# Patient Record
Sex: FEMALE | Race: WHITE | Employment: OTHER | URBAN - METROPOLITAN AREA
[De-identification: names, ages, dates, MRNs, and addresses within clinical notes are randomized per-mention and may not be internally consistent; named-entity substitution may affect disease eponyms.]

---

## 2018-04-20 ENCOUNTER — HOSPITAL ENCOUNTER (EMERGENCY)
Age: 63
Discharge: HOME | End: 2018-04-20
Attending: EMERGENCY MEDICINE
Payer: SELF-PAY

## 2018-04-20 VITALS
BODY MASS INDEX: 29.02 KG/M2 | RESPIRATION RATE: 18 BRPM | OXYGEN SATURATION: 98 % | WEIGHT: 170 LBS | SYSTOLIC BLOOD PRESSURE: 142 MMHG | HEIGHT: 64 IN | TEMPERATURE: 98.1 F | HEART RATE: 94 BPM | DIASTOLIC BLOOD PRESSURE: 72 MMHG

## 2018-04-20 DIAGNOSIS — M25.462 KNEE EFFUSION, LEFT: Primary | ICD-10-CM

## 2018-04-20 LAB
ANION GAP SERPL CALC-SCNC: 7 MMOL/L (ref 7–16)
BASOPHILS # BLD: 0.1 K/UL (ref 0–0.2)
BASOPHILS NFR BLD: 1 % (ref 0–2)
BUN SERPL-MCNC: 18 MG/DL (ref 8–23)
CALCIUM SERPL-MCNC: 9.3 MG/DL (ref 8.3–10.4)
CHLORIDE SERPL-SCNC: 103 MMOL/L (ref 98–107)
CO2 SERPL-SCNC: 31 MMOL/L (ref 21–32)
CREAT SERPL-MCNC: 0.96 MG/DL (ref 0.6–1)
CRP SERPL-MCNC: <0.3 MG/DL (ref 0–0.9)
DIFFERENTIAL METHOD BLD: ABNORMAL
EOSINOPHIL # BLD: 0.4 K/UL (ref 0–0.8)
EOSINOPHIL NFR BLD: 6 % (ref 0.5–7.8)
ERYTHROCYTE [DISTWIDTH] IN BLOOD BY AUTOMATED COUNT: 12.5 % (ref 11.9–14.6)
GLUCOSE SERPL-MCNC: 112 MG/DL (ref 65–100)
HCT VFR BLD AUTO: 40.5 % (ref 35.8–46.3)
HGB BLD-MCNC: 13.2 G/DL (ref 11.7–15.4)
IMM GRANULOCYTES # BLD: 0 K/UL (ref 0–0.5)
IMM GRANULOCYTES NFR BLD AUTO: 0 % (ref 0–5)
LYMPHOCYTES # BLD: 2.6 K/UL (ref 0.5–4.6)
LYMPHOCYTES NFR BLD: 39 % (ref 13–44)
MCH RBC QN AUTO: 28.1 PG (ref 26.1–32.9)
MCHC RBC AUTO-ENTMCNC: 32.6 G/DL (ref 31.4–35)
MCV RBC AUTO: 86.2 FL (ref 79.6–97.8)
MONOCYTES # BLD: 0.6 K/UL (ref 0.1–1.3)
MONOCYTES NFR BLD: 10 % (ref 4–12)
NEUTS SEG # BLD: 2.9 K/UL (ref 1.7–8.2)
NEUTS SEG NFR BLD: 44 % (ref 43–78)
PLATELET # BLD AUTO: 183 K/UL (ref 150–450)
PMV BLD AUTO: 10.5 FL (ref 10.8–14.1)
POTASSIUM SERPL-SCNC: 4 MMOL/L (ref 3.5–5.1)
RBC # BLD AUTO: 4.7 M/UL (ref 4.05–5.25)
SODIUM SERPL-SCNC: 141 MMOL/L (ref 136–145)
WBC # BLD AUTO: 6.5 K/UL (ref 4.3–11.1)

## 2018-04-20 PROCEDURE — 89060 EXAM SYNOVIAL FLUID CRYSTALS: CPT | Performed by: EMERGENCY MEDICINE

## 2018-04-20 PROCEDURE — 80048 BASIC METABOLIC PNL TOTAL CA: CPT | Performed by: EMERGENCY MEDICINE

## 2018-04-20 PROCEDURE — 75810000054 HC ARTHOCENTISIS JOINT: Performed by: EMERGENCY MEDICINE

## 2018-04-20 PROCEDURE — 87205 SMEAR GRAM STAIN: CPT | Performed by: EMERGENCY MEDICINE

## 2018-04-20 PROCEDURE — 86140 C-REACTIVE PROTEIN: CPT | Performed by: EMERGENCY MEDICINE

## 2018-04-20 PROCEDURE — 99283 EMERGENCY DEPT VISIT LOW MDM: CPT | Performed by: EMERGENCY MEDICINE

## 2018-04-20 PROCEDURE — 85025 COMPLETE CBC W/AUTO DIFF WBC: CPT | Performed by: EMERGENCY MEDICINE

## 2018-04-20 PROCEDURE — 74011250637 HC RX REV CODE- 250/637: Performed by: EMERGENCY MEDICINE

## 2018-04-20 PROCEDURE — 89050 BODY FLUID CELL COUNT: CPT | Performed by: EMERGENCY MEDICINE

## 2018-04-20 RX ORDER — HYDROCODONE BITARTRATE AND ACETAMINOPHEN 7.5; 325 MG/1; MG/1
1 TABLET ORAL
Status: COMPLETED | OUTPATIENT
Start: 2018-04-20 | End: 2018-04-20

## 2018-04-20 RX ORDER — SODIUM CHLORIDE 0.9 % (FLUSH) 0.9 %
5-10 SYRINGE (ML) INJECTION EVERY 8 HOURS
Status: DISCONTINUED | OUTPATIENT
Start: 2018-04-20 | End: 2018-04-20 | Stop reason: HOSPADM

## 2018-04-20 RX ORDER — SODIUM CHLORIDE 0.9 % (FLUSH) 0.9 %
5-10 SYRINGE (ML) INJECTION AS NEEDED
Status: DISCONTINUED | OUTPATIENT
Start: 2018-04-20 | End: 2018-04-20 | Stop reason: HOSPADM

## 2018-04-20 RX ORDER — DICLOFENAC SODIUM AND MISOPROSTOL 75; 200 MG/1; UG/1
1 TABLET, DELAYED RELEASE ORAL 2 TIMES DAILY
Qty: 30 TAB | Refills: 0 | Status: SHIPPED | OUTPATIENT
Start: 2018-04-20 | End: 2018-04-25

## 2018-04-20 RX ADMIN — HYDROCODONE BITARTRATE AND ACETAMINOPHEN 1 TABLET: 7.5; 325 TABLET ORAL at 13:01

## 2018-04-20 NOTE — ED PROVIDER NOTES
HPI Comments: Pt is followed by [facility], Dr. Kerry Arredondo, and was seen in the office 2 weeks ago for left knee pain after a fall 1 week prior to that. She reports receiving an injection into the left knee joint. She has been ambulating with a walker an now reports increased swelling and \"shooting\" pain in the area of the left tibial tuberosity. No redness, dyspnea or swelling of the leg noted. She contacted the Ortho office by phone and was told to come to the ER to be evaluated for possible DVT. Patient is a 57 y.o. female presenting with leg pain. The history is provided by the patient. Leg Pain    This is a new problem. The current episode started more than 1 week ago. The problem occurs constantly. The problem has not changed since onset. The pain is present in the left lower leg and left knee. Quality: \"shooting\" The pain is at a severity of 8/10. The pain is moderate. Associated symptoms include stiffness. Pertinent negatives include no numbness and no tingling. The symptoms are aggravated by standing and movement. Treatments tried: Joint injection 2 weeks ago. The treatment provided no relief. There has been a history of trauma (X-rays were done in orthopedist's office which were negative per patient).         Past Medical History:   Diagnosis Date    Diabetes ([address]     Hypercholesterolemia     Hypertension     Mixed hyperlipidemia 5/11/2015    Type 2 diabetes mellitus without complication, without long-term current use of insulin ([address] 5/11/2015       Past Surgical History:   Procedure Laterality Date    HX GYN      tubal    HX ORTHOPAEDIC Right     knee    HX OTHER SURGICAL      abcess         Family History:   Problem Relation Age of Onset    Cancer Mother      colon    Diabetes Mother     Cancer Sister     Diabetes Sister     Cancer Sister     Cancer Maternal Aunt      neck       Social History     Social History    Marital status:      Spouse name: N/A    Number of children: N/A    Years of education: N/A     Occupational History    Not on file. Social History Main Topics    Smoking status: Never Smoker    Smokeless tobacco: Never Used    Alcohol use Yes      Comment: Occ. wine    Drug use: Not on file    Sexual activity: Not on file     Other Topics Concern    Not on file     Social History Narrative         ALLERGIES: Keflex [cephalexin]; Oxycodone; and Sulfamethoxazole-trimethoprim    Review of Systems   Constitutional: Negative for chills and fever. Musculoskeletal: Positive for stiffness. Neurological: Negative for tingling and numbness. All other systems reviewed and are negative. Vitals:    04/20/18 1130   BP: 153/73   Pulse: 94   Resp: 16   Temp: 98 °F (36.7 °C)   SpO2: 99%   Weight: 77.1 kg (170 lb)   Height: 5' 4\" (1.626 m)            Physical Exam   Constitutional: She is oriented to person, place, and time. She appears well-developed and well-nourished. No distress. HENT:   Head: Normocephalic and atraumatic. Eyes: Conjunctivae and EOM are normal. Pupils are equal, round, and reactive to light. Musculoskeletal: Normal range of motion. She exhibits tenderness. She exhibits no edema or deformity. There is no physical evidence on examination of the left lower extremity to suggest a DVT. There is no peripheral edema or ankle edema the extremity is neurovascularly intact. There is no calf tenderness and there is a negative Homans sign. There is however a joint effusion noted within the left knee the patella is ballotable. There is some warmth noted on examination but no erythema. Neurological: She is alert and oriented to person, place, and time. Skin: Skin is warm and dry. No erythema. Psychiatric: She has a normal mood and affect. Nursing note and vitals reviewed. MDM  Number of Diagnoses or Management Options  Diagnosis management comments: No physical evidence to suggest DVT noted however there is a joint effusion. With a history of the trauma as well as the joint injection possibility of infectious arthritis needs to be evaluated and joint aspiration will be performed in addition to some blood work and the patient will be given an analgesic. Amount and/or Complexity of Data Reviewed  Clinical lab tests: ordered and reviewed    Risk of Complications, Morbidity, and/or Mortality  Presenting problems: moderate  Diagnostic procedures: moderate  Management options: moderate    Patient Progress  Patient progress: stable        ED Course       Arthrocentesis  Date/Time: 4/20/2018 12:41 PM  Performed by: Tiffanie Palencia by: Scott Lr     Consent:     Consent obtained:  Verbal    Consent given by:  Patient    Risks discussed:  Bleeding, infection and incomplete drainage    Alternatives discussed:  No treatment  Location:     Location:  Knee    Knee:  L knee  Anesthesia (see MAR for exact dosages): Anesthesia method:  Local infiltration    Local anesthetic:  Lidocaine 1% w/o epi  Procedure details:     Preparation: Patient was prepped and draped in usual sterile fashion      Needle gauge:  18 G    Ultrasound guidance: no      Approach:  Medial    Aspirate amount:  5ml    Aspirate characteristics:  Yellow and serous    Steroid injected: no      Specimen collected: yes    Post-procedure details:     Dressing:  Adhesive bandage    Patient tolerance of procedure: Tolerated well, no immediate complications                 Dr. Zackery Sanches office notified of Dx and Tx plan for outpatient follow up.   No clinical sign of DVT; left knee fluid appears inflammatory in nature  Lab reported verbally WBC on fluid was 765

## 2018-04-20 NOTE — ED TRIAGE NOTES
Stinging feeling in the left leg. Shala Guzman about 3 weeks ago. Patient has cortisone shot 2 weeks ago and was told to follow up with Agatha Cummings. Ortho office called about swelling, redness and heat on the left knee.  Office states patient should come to the ER for ultrasound to rule out blood clot

## 2018-04-20 NOTE — ED NOTES
I have reviewed discharge instructions with the patient. The patient verbalized understanding. Patient left ED via Discharge Method: ambulatory to Home with sister. Opportunity for questions and clarification provided. Patient given 1 scripts. To continue your aftercare when you leave the hospital, you may receive an automated call from our care team to check in on how you are doing. This is a free service and part of our promise to provide the best care and service to meet your aftercare needs.  If you have questions, or wish to unsubscribe from this service please call [phone]. Thank you for Choosing our [facility] Emergency Department.

## 2018-04-21 LAB
APPEARANCE FLD: NORMAL
BODY FLD TYPE: NORMAL
COLOR FLD: NORMAL
CRYSTALS FLD MICRO: NORMAL
LYMPHOCYTES NFR FLD: 76 %
NEUTROPHILS NFR FLD: 24 %
NUC CELL # FLD: 765 /CU MM
RBC # FLD: NORMAL /CU MM
SPECIMEN SOURCE FLD: NORMAL

## 2018-04-21 NOTE — PROGRESS NOTES
Dr. Astrid Humphrey reviewed the cell count yesterday prior to discharge and states no antibiotics indicated.

## 2018-04-22 LAB
BACTERIA SPEC CULT: NORMAL
GRAM STN SPEC: NORMAL
GRAM STN SPEC: NORMAL
SERVICE CMNT-IMP: NORMAL

## 2018-05-23 PROBLEM — M1A.0620 IDIOPATHIC CHRONIC GOUT OF LEFT KNEE WITHOUT TOPHUS: Status: ACTIVE | Noted: 2018-05-23

## 2018-10-24 ENCOUNTER — HOSPITAL ENCOUNTER (OUTPATIENT)
Dept: MRI IMAGING | Age: 63
Discharge: HOME | End: 2018-10-24
Attending: FAMILY MEDICINE
Payer: MEDICARE

## 2018-10-24 DIAGNOSIS — G89.29 CHRONIC MIDLINE LOW BACK PAIN WITH BILATERAL SCIATICA: ICD-10-CM

## 2018-10-24 DIAGNOSIS — M54.42 CHRONIC MIDLINE LOW BACK PAIN WITH BILATERAL SCIATICA: ICD-10-CM

## 2018-10-24 DIAGNOSIS — M54.41 CHRONIC MIDLINE LOW BACK PAIN WITH BILATERAL SCIATICA: ICD-10-CM

## 2018-10-24 DIAGNOSIS — R29.898 WEAKNESS OF BOTH LEGS: ICD-10-CM

## 2018-10-24 PROCEDURE — 72148 MRI LUMBAR SPINE W/O DYE: CPT

## 2018-10-26 NOTE — PROGRESS NOTES
Please let know multi-level degenerative changes with severe right and moderate left nervie canal narrowing. Also with disc bulging. Please get in with [facility] to look at potential treatment options.

## 2019-07-01 ENCOUNTER — HOSPITAL ENCOUNTER (OUTPATIENT)
Dept: MRI IMAGING | Age: 64
Discharge: HOME | End: 2019-07-01
Attending: NURSE PRACTITIONER
Payer: MEDICARE

## 2019-07-01 DIAGNOSIS — G95.9 CERVICAL MYELOPATHY (HCC): ICD-10-CM

## 2019-07-01 DIAGNOSIS — R29.2 HYPER REFLEXIA: ICD-10-CM

## 2019-07-01 PROCEDURE — 72141 MRI NECK SPINE W/O DYE: CPT

## 2019-07-30 RX ORDER — CEFAZOLIN SODIUM/WATER 2 G/20 ML
2 SYRINGE (ML) INTRAVENOUS ONCE
Status: CANCELLED | OUTPATIENT
Start: 2019-07-30 | End: 2019-07-30

## 2019-08-07 ENCOUNTER — HOSPITAL ENCOUNTER (OUTPATIENT)
Dept: SURGERY | Age: 64
Discharge: HOME | End: 2019-08-07
Payer: MEDICARE

## 2019-08-07 VITALS
SYSTOLIC BLOOD PRESSURE: 108 MMHG | HEART RATE: 70 BPM | TEMPERATURE: 97.5 F | WEIGHT: 170 LBS | RESPIRATION RATE: 18 BRPM | OXYGEN SATURATION: 99 % | HEIGHT: 64 IN | BODY MASS INDEX: 29.02 KG/M2 | DIASTOLIC BLOOD PRESSURE: 58 MMHG

## 2019-08-07 LAB
ANION GAP SERPL CALC-SCNC: 7 MMOL/L (ref 7–16)
APPEARANCE UR: CLEAR
ATRIAL RATE: 65 BPM
BACTERIA SPEC CULT: NORMAL
BACTERIA URNS QL MICRO: 0 /HPF
BILIRUB UR QL: NEGATIVE
BUN SERPL-MCNC: 16 MG/DL (ref 8–23)
CALCIUM SERPL-MCNC: 9.2 MG/DL (ref 8.3–10.4)
CALCULATED P AXIS, ECG09: 70 DEGREES
CALCULATED R AXIS, ECG10: 72 DEGREES
CALCULATED T AXIS, ECG11: 56 DEGREES
CASTS URNS QL MICRO: 0 /LPF
CHLORIDE SERPL-SCNC: 105 MMOL/L (ref 98–107)
CO2 SERPL-SCNC: 31 MMOL/L (ref 21–32)
COLOR UR: YELLOW
CREAT SERPL-MCNC: 1.13 MG/DL (ref 0.6–1)
DIAGNOSIS, 93000: NORMAL
EPI CELLS #/AREA URNS HPF: ABNORMAL /HPF
ERYTHROCYTE [DISTWIDTH] IN BLOOD BY AUTOMATED COUNT: 12.5 % (ref 11.9–14.6)
EST. AVERAGE GLUCOSE BLD GHB EST-MCNC: 137 MG/DL
GLUCOSE BLD STRIP.AUTO-MCNC: 105 MG/DL (ref 65–100)
GLUCOSE SERPL-MCNC: 105 MG/DL (ref 65–100)
GLUCOSE UR STRIP.AUTO-MCNC: NEGATIVE MG/DL
HBA1C MFR BLD: 6.4 % (ref 4.8–6)
HCT VFR BLD AUTO: 39 % (ref 35.8–46.3)
HGB BLD-MCNC: 12.2 G/DL (ref 11.7–15.4)
HGB UR QL STRIP: ABNORMAL
KETONES UR QL STRIP.AUTO: NEGATIVE MG/DL
LEUKOCYTE ESTERASE UR QL STRIP.AUTO: NEGATIVE
MCH RBC QN AUTO: 28 PG (ref 26.1–32.9)
MCHC RBC AUTO-ENTMCNC: 31.3 G/DL (ref 31.4–35)
MCV RBC AUTO: 89.7 FL (ref 79.6–97.8)
NITRITE UR QL STRIP.AUTO: NEGATIVE
NRBC # BLD: 0 K/UL (ref 0–0.2)
P-R INTERVAL, ECG05: 114 MS
PH UR STRIP: 5 [PH] (ref 5–9)
PLATELET # BLD AUTO: 156 K/UL (ref 150–450)
PMV BLD AUTO: 11.1 FL (ref 9.4–12.3)
POTASSIUM SERPL-SCNC: 4 MMOL/L (ref 3.5–5.1)
PROT UR STRIP-MCNC: NEGATIVE MG/DL
Q-T INTERVAL, ECG07: 384 MS
QRS DURATION, ECG06: 84 MS
QTC CALCULATION (BEZET), ECG08: 399 MS
RBC # BLD AUTO: 4.35 M/UL (ref 4.05–5.2)
RBC #/AREA URNS HPF: 0 /HPF
SERVICE CMNT-IMP: NORMAL
SODIUM SERPL-SCNC: 143 MMOL/L (ref 136–145)
SP GR UR REFRACTOMETRY: 1.01 (ref 1–1.02)
UROBILINOGEN UR QL STRIP.AUTO: 0.2 EU/DL (ref 0.2–1)
VENTRICULAR RATE, ECG03: 65 BPM
WBC # BLD AUTO: 5.7 K/UL (ref 4.3–11.1)
WBC URNS QL MICRO: ABNORMAL /HPF

## 2019-08-07 PROCEDURE — 85027 COMPLETE CBC AUTOMATED: CPT

## 2019-08-07 PROCEDURE — 80048 BASIC METABOLIC PNL TOTAL CA: CPT

## 2019-08-07 PROCEDURE — 82962 GLUCOSE BLOOD TEST: CPT

## 2019-08-07 PROCEDURE — 93005 ELECTROCARDIOGRAM TRACING: CPT | Performed by: ORTHOPAEDIC SURGERY

## 2019-08-07 PROCEDURE — 83036 HEMOGLOBIN GLYCOSYLATED A1C: CPT

## 2019-08-07 PROCEDURE — 81001 URINALYSIS AUTO W/SCOPE: CPT

## 2019-08-07 PROCEDURE — 77030027138 HC INCENT SPIROMETER -A

## 2019-08-07 PROCEDURE — 87641 MR-STAPH DNA AMP PROBE: CPT

## 2019-08-07 NOTE — PERIOP NOTES
Patient verified name & . Order to obtain consent  found in EHR  and matches case posting. TYPE  CASE:ll              Orders:  received  Labs per Spine protocol:  CBC, BMP, U/A, HA1C, MRSA   Labs per anesthesia protocol: Poc Glucose, EKG  EKG/cardiac records  : Todat NSR with PAC, patient denies any cardiac hx  Glucose: 105    Medication bottles were visualized today. Instructed patient to continue previous medications as prescribed prior to surgery and  to [address] according to anesthesia guidelines with a small sip of water : none       Continue all previous medications unless otherwise directed. Instructed patient to hold all vitamins and supplements (with exception of renal vitamins) and the following medications prior to surgery: NSAIDS    Pt viewed Spine Pre-hab Video. All further questions were addressed. Pt was provided with antibacterial or non-moisturizing soap, Hibiclens, long-handled sponge, Spine Recovery booklet and incentive spirometer. Pt correctly demonstrated use of incentive spirometer and instruction to bring it to the hospital on day of surgery. Handouts and all Surgery instructions provided to pt and pt verbalizes understanding. Patient Guide to Surgery Packet provided to patient. Packet includes Patient Guide to surgery handout, Facts about Pain Management handout, Facts about Urinary Catherization handout, Hand Hygiene handout, Patient Education and Teaching Sheet -Transfusion of Blood and Blood Products handout, and  [facility] frequent question and answer sheet. Guide reviewed with the patient and all questions answered to the satisfaction of the patient. Pt advised to visit [email or web address] for more educational information regarding anesthesia and to record any additional questions that arise so that it can be addressed by the anesthesiologist on the morning of surgery.     Patient instructed on the following and verbalized understanding:  Arrive at main entrance, time of arrival to be called the day before by 1700. Responsible adult must drive patient to and from hospital, stay during surgery and 24 hours postoperatively. Npo after midnight including gum, mints and ice chips. Shower using hibiclens the night before and the morning of surgery. Hibiclens provided to the patient with handout and verbal instructions for use. Leave all valuables at home. Instructed on no jewelry or body piercings on the dos. Bring insurance card and ID. No perfumes, oil, powder, colognes, makeup or  lotions on the skin. Patient verbalized understanding of all instructions and provided all medical/health information to the best of their ability.

## 2019-08-07 NOTE — PERIOP NOTES
Lab results reviewed and routed to surgeon. Small amt blood noted in U/A. Recent Results (from the past 12 hour(s))   GLUCOSE, POC    Collection Time: 08/07/19  8:18 AM   Result Value Ref Range    Glucose (POC) 105 (H) 65 - 100 mg/dL   MSSA/MRSA SC BY PCR, NASAL SWAB    Collection Time: 08/07/19  8:20 AM   Result Value Ref Range    Special Requests: NO SPECIAL REQUESTS      Culture result:        SA target not detected. A MRSA NEGATIVE, SA NEGATIVE test result does not preclude MRSA or SA nasal colonization.    CBC W/O DIFF    Collection Time: 08/07/19  8:20 AM   Result Value Ref Range    WBC 5.7 4.3 - 11.1 K/uL    RBC 4.35 4.05 - 5.2 M/uL    HGB 12.2 11.7 - 15.4 g/dL    HCT 39.0 35.8 - 46.3 %    MCV 89.7 79.6 - 97.8 FL    MCH 28.0 26.1 - 32.9 PG    MCHC 31.3 (L) 31.4 - 35.0 g/dL    RDW 12.5 11.9 - 14.6 %    PLATELET 384 036 - 502 K/uL    MPV 11.1 9.4 - 12.3 FL    ABSOLUTE NRBC 0.00 0.0 - 0.2 K/uL   METABOLIC PANEL, BASIC    Collection Time: 08/07/19  8:20 AM   Result Value Ref Range    Sodium 143 136 - 145 mmol/L    Potassium 4.0 3.5 - 5.1 mmol/L    Chloride 105 98 - 107 mmol/L    CO2 31 21 - 32 mmol/L    Anion gap 7 7 - 16 mmol/L    Glucose 105 (H) 65 - 100 mg/dL    BUN 16 8 - 23 MG/DL    Creatinine 1.13 (H) 0.6 - 1.0 MG/DL    GFR est AA >60 >60 ml/min/1.73m2    GFR est non-AA 51 (L) >60 ml/min/1.73m2    Calcium 9.2 8.3 - 10.4 MG/DL   URINALYSIS W/ RFLX MICROSCOPIC    Collection Time: 08/07/19  8:20 AM   Result Value Ref Range    Color YELLOW      Appearance CLEAR      Specific gravity 1.007 1.001 - 1.023      pH (UA) 5.0 5.0 - 9.0      Protein NEGATIVE  NEG mg/dL    Glucose NEGATIVE  mg/dL    Ketone NEGATIVE  NEG mg/dL    Bilirubin NEGATIVE  NEG      Blood SMALL (A) NEG      Urobilinogen 0.2 0.2 - 1.0 EU/dL    Nitrites NEGATIVE  NEG      Leukocyte Esterase NEGATIVE  NEG      WBC 0-3 0 /hpf    RBC 0 0 /hpf    Epithelial cells 0-3 0 /hpf    Bacteria 0 0 /hpf    Casts 0 0 /lpf HEMOGLOBIN A1C WITH EAG    Collection Time: 08/07/19  8:20 AM   Result Value Ref Range    Hemoglobin A1c 6.4 (H) 4.8 - 6.0 %    Est. average glucose 137 mg/dL

## 2019-08-13 ENCOUNTER — ANESTHESIA EVENT (OUTPATIENT)
Dept: SURGERY | Age: 64
DRG: 472 | End: 2019-08-13
Payer: MEDICARE

## 2019-08-13 NOTE — H&P
Chief complaint: Radiating neck pain. History of present illness: The patient returns today with persistent symptoms of radiating neck pain and functional deficit as previously described. The symptoms have been present for Multiple years. Patient was found to be myelopathic in 2018. But she never proceeded with the MRI due to not being able to afford it. She now is getting assistance through [address] [place] [address]. She has both lumbar and cervical spinal stenosis. She was found be myelopathic. Her MRI of her cervical spine revealed severe spinal stenosis from C4-C6. There is also mild malacia noted within the cord at this level. . Pain remains moderate to severe. Pain is qualified as a 8. Pain is worse with activity. There has been no lasting benefit from conservative efforts including Previous physical therapy and medication management. .  On exam patient was found to have an antalgic gait. She's got positive Ravin's 3+ reflexes and intrinsic weakness. . At this point she is ready to proceed with surgical intervention. PMHx/PSHx/Social History/Medications/Allergies/ROS: are listed separately in the electronic medical record and have been reviewed. ROS:     No fever, chills, or chest pain. ????? Medications:  ????? Lisinopril-Hydrochlorothiazide;MetFORMIN HCl;Pravastatin Sodium;TraMADol HCl (50 MG, Take 1-2 tablet(s) by mouth every 4-6 hours as needed [PRN]);Voltaren (1 %, 100 G tube apply 4GMS to affected area 4 times daily)    Allergies:  ?????  ?????    Physical Exam: This is a well developed well nourished adult female in no acute distress. Mood and affect are appropriate. Oriented to person, place, and time. Head is normocephalic and atraumatic. Extraocular movements intact. Sclera anicteric. Respirations are unlabored and there is no evidence of cyanosis. Chest is clear to auscultation. Heart is regular rate and rhythm. Abdomen is soft.      There is subjective light touch sensory loss over the bilateral hands in a glove like patten. Sarai Chars Spurlings is positive. The patient ambulates with a unsteady gait. Deep tendon reflex testing in the upper extremity reveals the following. Right Left   Biceps (C5) 3 3   Brachio radialis (C6) 3 3    Triceps (C7) 3 3                 Gilberts is positive. Ankle jerk is negative. Inverted radial reflex is positive. Strength testing in the upper extremity reveals the following based on the 5 point grading scale:     Delt(C5) Bicep(C6) WE(C6) Tricep (C7) WF(C7) (C8) Int (T1)   Right 5 5 5 5 5 5 5   Left 5 5 5 5 5 5 5     Pulses are palpable over bilateral radial arteries. Radiographic Studies:    X-rays and MRI were again independently reviewed and correlate with the patients symptoms. X    Assessment/Plan: This patients clinical history and physical exam is consistent with Severe cervical stenosis from C4-C6 with myelomalacia. Causing myelopathy. The imaging studies are concordant with the patients symptoms. Conservative efforts have been reasonably exhausted and the patient feels like she cannot go on with the symptoms as they are. We have previously discussed surgical options and now she would like to proceed with surgical scheduling.    ????? Patient Dr. Suleiman Fuller and the following was discussed: The patient would be placed in the prone position and a midline incision would be made over the back of the neck. Dissection would be carried down to expose the spine and an X-ray would be obtained to identify the appropriate level. The affected vertebrae would then be fused together with bone graft or marrow taken from a small incision over the buttock. Screws and rods would be placed to supplement the fusion. Once the hardware is in place, the bone covering the spinal cord would be removed with a warner. The wound would then be closed over a drain and sterile dressings applied.   The patient would expect to stay in the hospital 1-3 days depending on how quickly she recovers. Follow-up would be scheduled for 2-3 weeks and she would have restrictions including no driving for 4 weeks, no lifting greater than 10 lbs,. We also discussed the potential risks of the surgery including, but not limited to infection, spinal fluid leak, compressive hematoma; injury to spinal cord or peripheral nerve root resulting in paralysis, or loss of use of an extremity; persistent neck or arm symptoms or pain at the bone graft site; failure of the bone graft to heal or failure of the hardware resulting in the possibility of needing additional surgery; postoperative hoarseness or dysphagia; injury to an artery or vein resulting in significant blood loss; and the risks of anesthesia including, but not limited heart attack, stroke, blood clot or death. The patient voiced an understanding of these issues and would like to discuss them over with family and will get back with me with her desired treatment course. The procedure that I would recommend here is a laminectomy and fusion from C4-C6 with allograft and lateral mass instrumentation. The patient has been given a brochure on cervical laminectomy and fusion. Patient is going to expedite her try to contact [facility] to expedite her sponsorship the hospital.  We did discuss the severity of her findings of her cervical spine, she is aware that this needs to be handled in a timely fashion or she could have worsening symptoms of paralysis and spinal cord damage. ????? ?????       Electronically Signed By Charlie ERVIN and Twan Martinez MD on 7/12/2019

## 2019-08-14 ENCOUNTER — HOSPITAL ENCOUNTER (INPATIENT)
Age: 64
LOS: 2 days | Discharge: HOME | DRG: 472 | End: 2019-08-16
Attending: ORTHOPAEDIC SURGERY | Admitting: ORTHOPAEDIC SURGERY
Payer: MEDICARE

## 2019-08-14 ENCOUNTER — APPOINTMENT (OUTPATIENT)
Dept: GENERAL RADIOLOGY | Age: 64
DRG: 472 | End: 2019-08-14
Attending: ORTHOPAEDIC SURGERY
Payer: MEDICARE

## 2019-08-14 ENCOUNTER — ANESTHESIA (OUTPATIENT)
Dept: SURGERY | Age: 64
DRG: 472 | End: 2019-08-14
Payer: MEDICARE

## 2019-08-14 DIAGNOSIS — R69 DIAGNOSIS UNKNOWN: ICD-10-CM

## 2019-08-14 DIAGNOSIS — M48.02 CERVICAL SPINAL STENOSIS: Primary | ICD-10-CM

## 2019-08-14 PROBLEM — M47.12 CERVICAL SPONDYLOSIS WITH MYELOPATHY: Status: ACTIVE | Noted: 2019-08-14

## 2019-08-14 PROBLEM — G95.89 MYELOMALACIA (HCC): Status: ACTIVE | Noted: 2019-08-14

## 2019-08-14 LAB
ABO + RH BLD: NORMAL
BLOOD GROUP ANTIBODIES SERPL: NORMAL
GLUCOSE BLD STRIP.AUTO-MCNC: 110 MG/DL (ref 65–100)
GLUCOSE BLD STRIP.AUTO-MCNC: 133 MG/DL (ref 65–100)
GLUCOSE BLD STRIP.AUTO-MCNC: 152 MG/DL (ref 65–100)
SPECIMEN EXP DATE BLD: NORMAL

## 2019-08-14 PROCEDURE — 77030018836 HC SOL IRR NACL ICUM -A: Performed by: ORTHOPAEDIC SURGERY

## 2019-08-14 PROCEDURE — 76010000162 HC OR TIME 1.5 TO 2 HR INTENSV-TIER 1: Performed by: ORTHOPAEDIC SURGERY

## 2019-08-14 PROCEDURE — 74011250637 HC RX REV CODE- 250/637: Performed by: ORTHOPAEDIC SURGERY

## 2019-08-14 PROCEDURE — 0RG20K1 FUSION OF 2 OR MORE CERVICAL VERTEBRAL JOINTS WITH NONAUTOLOGOUS TISSUE SUBSTITUTE, POSTERIOR APPROACH, POSTERIOR COLUMN, OPEN APPROACH: ICD-10-PCS | Performed by: ORTHOPAEDIC SURGERY

## 2019-08-14 PROCEDURE — 65270000029 HC RM PRIVATE

## 2019-08-14 PROCEDURE — 77030037709: Performed by: ORTHOPAEDIC SURGERY

## 2019-08-14 PROCEDURE — 77030039425 HC BLD LARYNG TRULITE DISP TELE -A: Performed by: ANESTHESIOLOGY

## 2019-08-14 PROCEDURE — C1713 ANCHOR/SCREW BN/BN,TIS/BN: HCPCS | Performed by: ORTHOPAEDIC SURGERY

## 2019-08-14 PROCEDURE — 74011250636 HC RX REV CODE- 250/636: Performed by: ORTHOPAEDIC SURGERY

## 2019-08-14 PROCEDURE — 77030019908 HC STETH ESOPH SIMS -A: Performed by: ANESTHESIOLOGY

## 2019-08-14 PROCEDURE — 74011250636 HC RX REV CODE- 250/636: Performed by: ANESTHESIOLOGY

## 2019-08-14 PROCEDURE — 86900 BLOOD TYPING SEROLOGIC ABO: CPT

## 2019-08-14 PROCEDURE — 77030020255 HC SOL INJ LR 1000ML BG

## 2019-08-14 PROCEDURE — 74011250636 HC RX REV CODE- 250/636

## 2019-08-14 PROCEDURE — 77030031139 HC SUT VCRL2 J&J -A: Performed by: ORTHOPAEDIC SURGERY

## 2019-08-14 PROCEDURE — 72020 X-RAY EXAM OF SPINE 1 VIEW: CPT

## 2019-08-14 PROCEDURE — 77030012406 HC DRN WND PENRS BARD -A: Performed by: ORTHOPAEDIC SURGERY

## 2019-08-14 PROCEDURE — 82962 GLUCOSE BLOOD TEST: CPT

## 2019-08-14 PROCEDURE — 77030018547 HC SUT ETHBND1 J&J -B: Performed by: ORTHOPAEDIC SURGERY

## 2019-08-14 PROCEDURE — 77030034850: Performed by: ORTHOPAEDIC SURGERY

## 2019-08-14 PROCEDURE — 77030003861 HC BIT DRL STRY -C: Performed by: ORTHOPAEDIC SURGERY

## 2019-08-14 PROCEDURE — 72040 X-RAY EXAM NECK SPINE 2-3 VW: CPT

## 2019-08-14 PROCEDURE — 77030032490 HC SLV COMPR SCD KNE COVD -B: Performed by: ORTHOPAEDIC SURGERY

## 2019-08-14 PROCEDURE — 74011000250 HC RX REV CODE- 250

## 2019-08-14 PROCEDURE — 76210000016 HC OR PH I REC 1 TO 1.5 HR: Performed by: ORTHOPAEDIC SURGERY

## 2019-08-14 PROCEDURE — 77030037088 HC TUBE ENDOTRACH ORAL NSL COVD-A: Performed by: ANESTHESIOLOGY

## 2019-08-14 PROCEDURE — 77030025623 HC BUR RND PRECIS STRY -D: Performed by: ORTHOPAEDIC SURGERY

## 2019-08-14 PROCEDURE — 00NW0ZZ RELEASE CERVICAL SPINAL CORD, OPEN APPROACH: ICD-10-PCS | Performed by: ORTHOPAEDIC SURGERY

## 2019-08-14 PROCEDURE — 77030012894: Performed by: ORTHOPAEDIC SURGERY

## 2019-08-14 PROCEDURE — 76060000034 HC ANESTHESIA 1.5 TO 2 HR: Performed by: ORTHOPAEDIC SURGERY

## 2019-08-14 PROCEDURE — 77030030163 HC BN WAX J&J -A: Performed by: ORTHOPAEDIC SURGERY

## 2019-08-14 PROCEDURE — 77030016570 HC BLNKT BAIR HGGR 3M -B: Performed by: ANESTHESIOLOGY

## 2019-08-14 DEVICE — BLOCKER
Type: IMPLANTABLE DEVICE | Site: SPINE CERVICAL | Status: FUNCTIONAL
Brand: OASYS

## 2019-08-14 DEVICE — GRAFT BNE SUB 25CC 2MM GRAN ALLOGENIC MORPHOGENETIC PROT W: Type: IMPLANTABLE DEVICE | Site: SPINE CERVICAL | Status: FUNCTIONAL

## 2019-08-14 DEVICE — POLYAXIAL SCREW
Type: IMPLANTABLE DEVICE | Site: SPINE CERVICAL | Status: FUNCTIONAL
Brand: OASYS

## 2019-08-14 DEVICE — ROD
Type: IMPLANTABLE DEVICE | Site: SPINE CERVICAL | Status: FUNCTIONAL
Brand: OASYS

## 2019-08-14 DEVICE — BIO DBM PLUS PUTTY (WITH CANCELLOUS)
Type: IMPLANTABLE DEVICE | Site: SPINE CERVICAL | Status: FUNCTIONAL
Brand: BIO DBM

## 2019-08-14 RX ORDER — OXYCODONE HYDROCHLORIDE 5 MG/1
5 TABLET ORAL
Status: DISCONTINUED | OUTPATIENT
Start: 2019-08-14 | End: 2019-08-14 | Stop reason: HOSPADM

## 2019-08-14 RX ORDER — HYDROMORPHONE HYDROCHLORIDE 1 MG/ML
1 INJECTION, SOLUTION INTRAMUSCULAR; INTRAVENOUS; SUBCUTANEOUS
Status: DISCONTINUED | OUTPATIENT
Start: 2019-08-14 | End: 2019-08-16 | Stop reason: HOSPADM

## 2019-08-14 RX ORDER — ONDANSETRON 2 MG/ML
4 INJECTION INTRAMUSCULAR; INTRAVENOUS
Status: DISCONTINUED | OUTPATIENT
Start: 2019-08-14 | End: 2019-08-16 | Stop reason: HOSPADM

## 2019-08-14 RX ORDER — ACETAMINOPHEN 10 MG/ML
1000 INJECTION, SOLUTION INTRAVENOUS
Status: COMPLETED | OUTPATIENT
Start: 2019-08-14 | End: 2019-08-14

## 2019-08-14 RX ORDER — HYDROMORPHONE HYDROCHLORIDE 2 MG/1
2 TABLET ORAL
Status: DISCONTINUED | OUTPATIENT
Start: 2019-08-14 | End: 2019-08-16 | Stop reason: HOSPADM

## 2019-08-14 RX ORDER — MIDAZOLAM HYDROCHLORIDE 1 MG/ML
2 INJECTION, SOLUTION INTRAMUSCULAR; INTRAVENOUS
Status: DISCONTINUED | OUTPATIENT
Start: 2019-08-14 | End: 2019-08-14 | Stop reason: HOSPADM

## 2019-08-14 RX ORDER — DIPHENHYDRAMINE HCL 25 MG
25 CAPSULE ORAL
Status: DISCONTINUED | OUTPATIENT
Start: 2019-08-14 | End: 2019-08-16 | Stop reason: HOSPADM

## 2019-08-14 RX ORDER — DEXAMETHASONE SODIUM PHOSPHATE 4 MG/ML
INJECTION, SOLUTION INTRA-ARTICULAR; INTRALESIONAL; INTRAMUSCULAR; INTRAVENOUS; SOFT TISSUE AS NEEDED
Status: DISCONTINUED | OUTPATIENT
Start: 2019-08-14 | End: 2019-08-14 | Stop reason: HOSPADM

## 2019-08-14 RX ORDER — HYDROMORPHONE HYDROCHLORIDE 2 MG/1
2 TABLET ORAL
Qty: 20 TAB | Refills: 0 | Status: SHIPPED | OUTPATIENT
Start: 2019-08-14 | End: 2019-08-19

## 2019-08-14 RX ORDER — CEFAZOLIN SODIUM/WATER 2 G/20 ML
2 SYRINGE (ML) INTRAVENOUS ONCE
Status: COMPLETED | OUTPATIENT
Start: 2019-08-14 | End: 2019-08-14

## 2019-08-14 RX ORDER — PRAVASTATIN SODIUM 20 MG/1
40 TABLET ORAL DAILY
Status: DISCONTINUED | OUTPATIENT
Start: 2019-08-15 | End: 2019-08-16 | Stop reason: HOSPADM

## 2019-08-14 RX ORDER — ONDANSETRON 2 MG/ML
INJECTION INTRAMUSCULAR; INTRAVENOUS AS NEEDED
Status: DISCONTINUED | OUTPATIENT
Start: 2019-08-14 | End: 2019-08-14 | Stop reason: HOSPADM

## 2019-08-14 RX ORDER — CEFAZOLIN SODIUM/WATER 2 G/20 ML
2 SYRINGE (ML) INTRAVENOUS EVERY 8 HOURS
Status: COMPLETED | OUTPATIENT
Start: 2019-08-14 | End: 2019-08-15

## 2019-08-14 RX ORDER — SODIUM CHLORIDE, SODIUM LACTATE, POTASSIUM CHLORIDE, CALCIUM CHLORIDE 600; 310; 30; 20 MG/100ML; MG/100ML; MG/100ML; MG/100ML
75 INJECTION, SOLUTION INTRAVENOUS CONTINUOUS
Status: DISPENSED | OUTPATIENT
Start: 2019-08-14 | End: 2019-08-15

## 2019-08-14 RX ORDER — HYDROMORPHONE HYDROCHLORIDE 2 MG/ML
0.5 INJECTION, SOLUTION INTRAMUSCULAR; INTRAVENOUS; SUBCUTANEOUS
Status: DISCONTINUED | OUTPATIENT
Start: 2019-08-14 | End: 2019-08-14 | Stop reason: HOSPADM

## 2019-08-14 RX ORDER — FAMOTIDINE 20 MG/1
20 TABLET, FILM COATED ORAL DAILY
Status: DISCONTINUED | OUTPATIENT
Start: 2019-08-15 | End: 2019-08-16 | Stop reason: HOSPADM

## 2019-08-14 RX ORDER — ZOLPIDEM TARTRATE 5 MG/1
5 TABLET ORAL
Status: DISCONTINUED | OUTPATIENT
Start: 2019-08-14 | End: 2019-08-16 | Stop reason: HOSPADM

## 2019-08-14 RX ORDER — ACETAMINOPHEN 500 MG
500 TABLET ORAL ONCE
Status: DISCONTINUED | OUTPATIENT
Start: 2019-08-14 | End: 2019-08-14 | Stop reason: HOSPADM

## 2019-08-14 RX ORDER — NALOXONE HYDROCHLORIDE 0.4 MG/ML
0.4 INJECTION, SOLUTION INTRAMUSCULAR; INTRAVENOUS; SUBCUTANEOUS
Status: DISCONTINUED | OUTPATIENT
Start: 2019-08-14 | End: 2019-08-16 | Stop reason: HOSPADM

## 2019-08-14 RX ORDER — LIDOCAINE HYDROCHLORIDE 20 MG/ML
INJECTION, SOLUTION EPIDURAL; INFILTRATION; INTRACAUDAL; PERINEURAL AS NEEDED
Status: DISCONTINUED | OUTPATIENT
Start: 2019-08-14 | End: 2019-08-14 | Stop reason: HOSPADM

## 2019-08-14 RX ORDER — SODIUM CHLORIDE 0.9 % (FLUSH) 0.9 %
5-40 SYRINGE (ML) INJECTION EVERY 8 HOURS
Status: DISCONTINUED | OUTPATIENT
Start: 2019-08-14 | End: 2019-08-16 | Stop reason: HOSPADM

## 2019-08-14 RX ORDER — DIPHENHYDRAMINE HYDROCHLORIDE 50 MG/ML
12.5 INJECTION, SOLUTION INTRAMUSCULAR; INTRAVENOUS ONCE
Status: DISCONTINUED | OUTPATIENT
Start: 2019-08-14 | End: 2019-08-14 | Stop reason: HOSPADM

## 2019-08-14 RX ORDER — NEOSTIGMINE METHYLSULFATE 1 MG/ML
INJECTION INTRAVENOUS AS NEEDED
Status: DISCONTINUED | OUTPATIENT
Start: 2019-08-14 | End: 2019-08-14 | Stop reason: HOSPADM

## 2019-08-14 RX ORDER — ACETAMINOPHEN 325 MG/1
650 TABLET ORAL EVERY 6 HOURS
Status: DISCONTINUED | OUTPATIENT
Start: 2019-08-14 | End: 2019-08-16 | Stop reason: HOSPADM

## 2019-08-14 RX ORDER — ONDANSETRON 2 MG/ML
4 INJECTION INTRAMUSCULAR; INTRAVENOUS ONCE
Status: DISCONTINUED | OUTPATIENT
Start: 2019-08-14 | End: 2019-08-14 | Stop reason: HOSPADM

## 2019-08-14 RX ORDER — LIDOCAINE HYDROCHLORIDE 10 MG/ML
0.1 INJECTION INFILTRATION; PERINEURAL AS NEEDED
Status: DISCONTINUED | OUTPATIENT
Start: 2019-08-14 | End: 2019-08-14 | Stop reason: HOSPADM

## 2019-08-14 RX ORDER — METFORMIN HYDROCHLORIDE 500 MG/1
1000 TABLET ORAL 2 TIMES DAILY WITH MEALS
Status: DISCONTINUED | OUTPATIENT
Start: 2019-08-14 | End: 2019-08-16 | Stop reason: HOSPADM

## 2019-08-14 RX ORDER — DOCUSATE SODIUM 100 MG/1
100 CAPSULE, LIQUID FILLED ORAL 2 TIMES DAILY
Status: DISCONTINUED | OUTPATIENT
Start: 2019-08-14 | End: 2019-08-16 | Stop reason: HOSPADM

## 2019-08-14 RX ORDER — ROCURONIUM BROMIDE 10 MG/ML
INJECTION, SOLUTION INTRAVENOUS AS NEEDED
Status: DISCONTINUED | OUTPATIENT
Start: 2019-08-14 | End: 2019-08-14 | Stop reason: HOSPADM

## 2019-08-14 RX ORDER — SODIUM CHLORIDE, SODIUM LACTATE, POTASSIUM CHLORIDE, CALCIUM CHLORIDE 600; 310; 30; 20 MG/100ML; MG/100ML; MG/100ML; MG/100ML
75 INJECTION, SOLUTION INTRAVENOUS CONTINUOUS
Status: DISCONTINUED | OUTPATIENT
Start: 2019-08-14 | End: 2019-08-14 | Stop reason: HOSPADM

## 2019-08-14 RX ORDER — SODIUM CHLORIDE 0.9 % (FLUSH) 0.9 %
5-40 SYRINGE (ML) INJECTION AS NEEDED
Status: DISCONTINUED | OUTPATIENT
Start: 2019-08-14 | End: 2019-08-16 | Stop reason: HOSPADM

## 2019-08-14 RX ORDER — FENTANYL CITRATE 50 UG/ML
INJECTION, SOLUTION INTRAMUSCULAR; INTRAVENOUS AS NEEDED
Status: DISCONTINUED | OUTPATIENT
Start: 2019-08-14 | End: 2019-08-14 | Stop reason: HOSPADM

## 2019-08-14 RX ORDER — LISINOPRIL AND HYDROCHLOROTHIAZIDE 12.5; 2 MG/1; MG/1
1 TABLET ORAL DAILY
Status: DISCONTINUED | OUTPATIENT
Start: 2019-08-15 | End: 2019-08-16 | Stop reason: HOSPADM

## 2019-08-14 RX ORDER — SODIUM CHLORIDE, SODIUM LACTATE, POTASSIUM CHLORIDE, CALCIUM CHLORIDE 600; 310; 30; 20 MG/100ML; MG/100ML; MG/100ML; MG/100ML
1000 INJECTION, SOLUTION INTRAVENOUS CONTINUOUS
Status: DISCONTINUED | OUTPATIENT
Start: 2019-08-14 | End: 2019-08-14 | Stop reason: HOSPADM

## 2019-08-14 RX ORDER — PROPOFOL 10 MG/ML
INJECTION, EMULSION INTRAVENOUS AS NEEDED
Status: DISCONTINUED | OUTPATIENT
Start: 2019-08-14 | End: 2019-08-14 | Stop reason: HOSPADM

## 2019-08-14 RX ORDER — NALOXONE HYDROCHLORIDE 0.4 MG/ML
0.1 INJECTION, SOLUTION INTRAMUSCULAR; INTRAVENOUS; SUBCUTANEOUS AS NEEDED
Status: DISCONTINUED | OUTPATIENT
Start: 2019-08-14 | End: 2019-08-14 | Stop reason: HOSPADM

## 2019-08-14 RX ORDER — FENTANYL CITRATE 50 UG/ML
100 INJECTION, SOLUTION INTRAMUSCULAR; INTRAVENOUS AS NEEDED
Status: DISCONTINUED | OUTPATIENT
Start: 2019-08-14 | End: 2019-08-14 | Stop reason: HOSPADM

## 2019-08-14 RX ORDER — OXYCODONE HYDROCHLORIDE 5 MG/1
10 TABLET ORAL
Status: DISCONTINUED | OUTPATIENT
Start: 2019-08-14 | End: 2019-08-14 | Stop reason: HOSPADM

## 2019-08-14 RX ORDER — GLYCOPYRROLATE 0.2 MG/ML
INJECTION INTRAMUSCULAR; INTRAVENOUS AS NEEDED
Status: DISCONTINUED | OUTPATIENT
Start: 2019-08-14 | End: 2019-08-14 | Stop reason: HOSPADM

## 2019-08-14 RX ADMIN — FENTANYL CITRATE 100 MCG: 50 INJECTION, SOLUTION INTRAMUSCULAR; INTRAVENOUS at 11:50

## 2019-08-14 RX ADMIN — HYDROMORPHONE HYDROCHLORIDE 0.5 MG: 2 INJECTION INTRAMUSCULAR; INTRAVENOUS; SUBCUTANEOUS at 13:55

## 2019-08-14 RX ADMIN — METFORMIN HYDROCHLORIDE 1000 MG: 500 TABLET, FILM COATED ORAL at 18:28

## 2019-08-14 RX ADMIN — SODIUM CHLORIDE, SODIUM LACTATE, POTASSIUM CHLORIDE, AND CALCIUM CHLORIDE 75 ML/HR: 600; 310; 30; 20 INJECTION, SOLUTION INTRAVENOUS at 18:33

## 2019-08-14 RX ADMIN — HYDROMORPHONE HYDROCHLORIDE 2 MG: 2 TABLET ORAL at 16:08

## 2019-08-14 RX ADMIN — ACETAMINOPHEN 1000 MG: 10 INJECTION, SOLUTION INTRAVENOUS at 14:45

## 2019-08-14 RX ADMIN — Medication 3 AMPULE: at 10:47

## 2019-08-14 RX ADMIN — NEOSTIGMINE METHYLSULFATE 3 MG: 1 INJECTION INTRAVENOUS at 13:07

## 2019-08-14 RX ADMIN — HYDROMORPHONE HYDROCHLORIDE 0.5 MG: 2 INJECTION INTRAMUSCULAR; INTRAVENOUS; SUBCUTANEOUS at 13:50

## 2019-08-14 RX ADMIN — Medication 1 AMPULE: at 20:31

## 2019-08-14 RX ADMIN — Medication 2 G: at 20:31

## 2019-08-14 RX ADMIN — LIDOCAINE HYDROCHLORIDE 60 MG: 20 INJECTION, SOLUTION EPIDURAL; INFILTRATION; INTRACAUDAL; PERINEURAL at 11:51

## 2019-08-14 RX ADMIN — HYDROMORPHONE HYDROCHLORIDE 1 MG: 1 INJECTION, SOLUTION INTRAMUSCULAR; INTRAVENOUS; SUBCUTANEOUS at 23:26

## 2019-08-14 RX ADMIN — ROCURONIUM BROMIDE 35 MG: 10 INJECTION, SOLUTION INTRAVENOUS at 11:51

## 2019-08-14 RX ADMIN — HYDROMORPHONE HYDROCHLORIDE 2 MG: 2 TABLET ORAL at 21:19

## 2019-08-14 RX ADMIN — ONDANSETRON 4 MG: 2 INJECTION INTRAMUSCULAR; INTRAVENOUS at 13:11

## 2019-08-14 RX ADMIN — HYDROMORPHONE HYDROCHLORIDE 0.5 MG: 2 INJECTION INTRAMUSCULAR; INTRAVENOUS; SUBCUTANEOUS at 14:00

## 2019-08-14 RX ADMIN — DEXAMETHASONE SODIUM PHOSPHATE 4 MG: 4 INJECTION, SOLUTION INTRA-ARTICULAR; INTRALESIONAL; INTRAMUSCULAR; INTRAVENOUS; SOFT TISSUE at 12:30

## 2019-08-14 RX ADMIN — Medication 10 ML: at 20:32

## 2019-08-14 RX ADMIN — PROPOFOL 150 MG: 10 INJECTION, EMULSION INTRAVENOUS at 11:51

## 2019-08-14 RX ADMIN — SODIUM CHLORIDE, SODIUM LACTATE, POTASSIUM CHLORIDE, AND CALCIUM CHLORIDE 1000 ML: 600; 310; 30; 20 INJECTION, SOLUTION INTRAVENOUS at 10:47

## 2019-08-14 RX ADMIN — GLYCOPYRROLATE 0.4 MG: 0.2 INJECTION INTRAMUSCULAR; INTRAVENOUS at 13:07

## 2019-08-14 RX ADMIN — DOCUSATE SODIUM 100 MG: 100 CAPSULE, LIQUID FILLED ORAL at 18:28

## 2019-08-14 RX ADMIN — Medication 2 G: at 12:10

## 2019-08-14 RX ADMIN — ACETAMINOPHEN 650 MG: 325 TABLET, FILM COATED ORAL at 23:26

## 2019-08-14 RX ADMIN — FENTANYL CITRATE 50 MCG: 50 INJECTION, SOLUTION INTRAMUSCULAR; INTRAVENOUS at 13:37

## 2019-08-14 RX ADMIN — HYDROMORPHONE HYDROCHLORIDE 1 MG: 1 INJECTION, SOLUTION INTRAMUSCULAR; INTRAVENOUS; SUBCUTANEOUS at 19:23

## 2019-08-14 RX ADMIN — ACETAMINOPHEN 650 MG: 325 TABLET, FILM COATED ORAL at 18:28

## 2019-08-14 NOTE — ANESTHESIA PREPROCEDURE EVALUATION
Relevant Problems   No relevant active problems       Anesthetic History   No history of anesthetic complications            Review of Systems / Medical History  Patient summary reviewed, nursing notes reviewed and pertinent labs reviewed    Pulmonary                   Neuro/Psych     seizures (As child - not treatment)         Cardiovascular    Hypertension              Exercise tolerance: >4 METS  Comments: Denies CP, SOB, Palps, Syncope, Fatigue   GI/Hepatic/Renal     GERD           Endo/Other    Diabetes: well controlled, type 2    Arthritis     Other Findings              Physical Exam    Airway  Mallampati: II  TM Distance: 4 - 6 cm  Neck ROM: normal range of motion   Mouth opening: Normal     Cardiovascular    Rhythm: regular  Rate: normal         Dental  No notable dental hx       Pulmonary  Breath sounds clear to auscultation               Abdominal  GI exam deferred       Other Findings            Anesthetic Plan    ASA: 2  Anesthesia type: general          Induction: Intravenous  Anesthetic plan and risks discussed with: Patient

## 2019-08-14 NOTE — PROGRESS NOTES
TRANSFER - IN REPORT:    Verbal report received from [place], RN(name) on [employer]  being received from [facility]) for routine progression of care      Report consisted of patients Situation, Background, Assessment and   Recommendations(SBAR). Information from the following report(s) SBAR, MAR and Recent Results was reviewed with the receiving nurse. Opportunity for questions and clarification was provided. Assessment to be  completed upon patients arrival to unit and care assumed.

## 2019-08-14 NOTE — PROGRESS NOTES
08/14/19 1558   Dual Skin Pressure Injury Assessment   Dual Skin Pressure Injury Assessment X   Surgical dressing to posterior neck is clean, dry, and intact. No other skin issues noted.

## 2019-08-14 NOTE — PERIOP NOTES
TRANSFER - OUT REPORT:    Verbal report given to Kristal GOFF on Duane Buttner  being transferred to [facility] for routine post - op       Report consisted of patients Situation, Background, Assessment and   Recommendations(SBAR). Information from the following report(s) SBAR, OR Summary, Procedure Summary, Intake/Output and MAR was reviewed with the receiving nurse. Lines:   Peripheral IV 08/14/19 Right Forearm (Active)   Site Assessment Clean, dry, & intact 8/14/2019  1:43 PM   Phlebitis Assessment 0 8/14/2019  1:43 PM   Infiltration Assessment 0 8/14/2019  1:43 PM   Dressing Status Clean, dry, & intact; Occlusive 8/14/2019  1:43 PM   Dressing Type Transparent;Tape 8/14/2019  1:43 PM   Hub Color/Line Status Green;Patent 8/14/2019  1:43 PM   Alcohol Cap Used No 8/14/2019  1:43 PM        Opportunity for questions and clarification was provided. Patient transported with:   O2 @ 2 liters    VTE prophylaxis orders have been written for Duane Buttner. Patient and family given floor number and nurses name. Family updated re: pt status after security code verified.

## 2019-08-14 NOTE — ANESTHESIA POSTPROCEDURE EVALUATION
Procedure(s):  C4-C6 LAMINECTOMY AND FUSION WITH ALLOGRAFT AND INSTRUMENTATION/ SSEP. general    Anesthesia Post Evaluation      Multimodal analgesia: multimodal analgesia used between 6 hours prior to anesthesia start to PACU discharge  Patient location during evaluation: bedside  Patient participation: complete - patient participated  Level of consciousness: responsive to verbal stimuli  Pain management: adequate  Airway patency: patent  Anesthetic complications: no  Cardiovascular status: hemodynamically stable  Respiratory status: spontaneous ventilation  Hydration status: stable        Vitals Value Taken Time   /71 8/14/2019  3:12 PM   Temp 36.9 °C (98.4 °F) 8/14/2019  2:57 PM   Pulse 59 8/14/2019  3:14 PM   Resp 16 8/14/2019  3:12 PM   SpO2 96 % 8/14/2019  3:12 PM   Vitals shown include unvalidated device data.

## 2019-08-14 NOTE — OP NOTES
[address] [address]   [phone]    OPERATIVE REPORT    Patient ID:Kelly Bailon  [id number]  1955  75 y.o. DATE OF SURGERY: 8/14/2019    SURGEON: Josesito Cm MD    PREOPERATIVE DIAGNOSIS: Multilevel cervical stenosis with myelopathy. POSTOPERATIVE DIAGNOSIS: Multilevel cervical stenosis with myelopathy. PROCEDURES:  1. Cervical laminectomy with bilateral foraminotomies C4, C5, C6 (CPT D1694927, [id number]). 2. Posterior cervical arthrodesis C4-C5, C5-C6. (CPT 30743, 31014 X 1)  3. Lateral mass instrumentation C3-C7 (CPT [id number])  4. Allograft (CPT 55870)    ANESTHESIA: General.    ESTIMATED BLOOD LOSS:  100 cc    INTRAOPERATIVE COMPLICATIONS: None. INTRAOPERATIVE NEUROMONITORING: There were no significant sustained changes in somatosensory-evoked potential monitoring. POSTOPERATIVE CONDITION: Stable. IMPLANTS:   Implant Name Type Inv.  Item Serial No.  Lot No. LRB No. Used Action   BLOCKER SET SCR LCK OASYS OCT --  - [id number]  BLOCKER SET SCR LCK OASYS OCT --   PEGGY [employer] HOWM [id number] N/A 6 Implanted   SCR SPNE POLYAXL BA 3.5X14MM -- OASYS OCCIPITO - [id number]  SCR SPNE POLYAXL BA 3.5X14MM -- OASYS OCCIPITO  PEGGY [employer]INE HOWM [id number] N/A 5 Implanted   TRAVIS SPNE OASYS OCT 3.5X40MM TI --  - [id number]  TRAVIS SPNE OASYS OCT 3.5X40MM TI --   PEGGY SPINE HOWM [id number] N/A 2 Implanted   SCR SPNE BA OCT 4.0X10MM TI -- OASYS - [id number]  SCR SPNE BA OCT 4.0X10MM TI -- OASYS  PEGGY SPINE HOWM [id number] N/A 1 Implanted   GRAFT BNE DBM PTTY W/CHIPS 5ML -- BIO DBM - [id number]  GRAFT BNE DBM PTTY W/CHIPS 5ML -- BIO DBM  PEGGY SPINE HOWM [id number] N/A 1 Implanted   GRAFT BNE GRAN 2.5ML -- OSTEOAMP - [id number]  GRAFT BNE GRAN 2.5ML -- OSTEOAMP [id number] Likva  N/A 1 Implanted         INDICATIONS FOR PROCEDURE: The patient was felt to have evidence of cervical myelopathy by history and physical exam. A cervical imaging study confirmed the presence of multilevel stenosis. In the outpatient setting the risks, benefits, and potential complications of the above listed procedure were discussed with him in detail and an informed consent was obtained. DESCRIPTION OF PROCEDURE: After adequate induction of general anesthesia, the patient was positioned prone on the operating table. Care was taken to pad all bony prominences. The shoulders were taped caudally to facilitate intraoperative radiographic imaging. The posterior neck was prepped and draped in the usual sterile fashion. A time-out was called to confirm appropriate patient, proposed procedure, and proposed incision site. Preoperative antibiotics were administered. Baseline neuromonitoring was performed. An incision was then created over the spinous processes directly in the midline of the posterior aspect of the neck. Dissection was carried down through nuchal ligament to the level of the spinous processes. A Kocher clamp was attached to a spinous process and a cross-table lateral fluoroscopic image was obtained to confirm spinal level. With this confirmation, the spinous process was marked with a Leksell rongeur. The remaining soft tissue attachments were reflected in a subperiosteal fashion from the spinous processes and lamina out to the lateral borders of the lateral masses and facet joints from C4-C6. Deep retractors were positioned to facilitate visualization of the entire wound. At this point the lateral masses were decorticated with a 4-mm bur. The facet joints were decorticated as well. The Check Oasys posterior cervical instrumentation system was brought to the field and using the 14-mm drill guide,  holes were created in each of the lateral masses from C4-C6 bilaterally. Each of the  holes were directed in about a 15-degree lateral and 15-degree cephalad direction.  The ball tip was used to palpate each  hole and the Oasys screws were then inserted by hand. At this point, appropriate length rods were cut and bent into lordosis. They were applied bilaterally and secured to the lateral mass screws with the set screws. The appropriate torque was applied at each level. C-arm fluoroscopy was brought in and used to confirm appropriate levels and appropriate hardware placement. This was felt to be satisfactory bilaterally. Bone marrow aspirate was obtained and spread over the allograft bone. Using the warner, cortical troughs were created bilaterally at the spinolaminar junction from C4-C6. The medial cortex was fractured with a Gardner elevator and the C4-C6 laminae were lifted off of the thecal sac with a curette and a Leksell rongeur. With the central laminectomy completed, the 2-mm Kerrison was used to trim all bony fragments from the lateral edges of the laminectomy site and perform partial foraminotomies. The C3 and C7 vertebrae were undercut with a Kerrison. With the laminectomy completed, the marrow soaked allograft was packed into the facet joints and beneath the lateral mass instrumentation. The wound was then liberally irrigated and a medium Hemovac drain was inserted through a separate exiting incision. The nuchal ligament was approximated with a #2 Ethibond, and the subcutaneous tissue and skin was approximated in a layered fashion with Vicryl suture. Benzoin and Steri-Strips were applied and sterile dressings were applied to both wounds. The patient was then transferred to the [facility]. The patient tolerated the procedure well and was returned to the post anesthesia care unit in stable condition. At the end of the case all sponge, needle, and instrument counts were correct.       Williams Palacios MD

## 2019-08-15 LAB
GLUCOSE BLD STRIP.AUTO-MCNC: 104 MG/DL (ref 65–100)
GLUCOSE BLD STRIP.AUTO-MCNC: 110 MG/DL (ref 65–100)
GLUCOSE BLD STRIP.AUTO-MCNC: 130 MG/DL (ref 65–100)
GLUCOSE BLD STRIP.AUTO-MCNC: 150 MG/DL (ref 65–100)

## 2019-08-15 PROCEDURE — 97535 SELF CARE MNGMENT TRAINING: CPT

## 2019-08-15 PROCEDURE — 65270000029 HC RM PRIVATE

## 2019-08-15 PROCEDURE — 74011250637 HC RX REV CODE- 250/637: Performed by: ORTHOPAEDIC SURGERY

## 2019-08-15 PROCEDURE — 97530 THERAPEUTIC ACTIVITIES: CPT

## 2019-08-15 PROCEDURE — 77030020255 HC SOL INJ LR 1000ML BG

## 2019-08-15 PROCEDURE — 82962 GLUCOSE BLOOD TEST: CPT

## 2019-08-15 PROCEDURE — 97161 PT EVAL LOW COMPLEX 20 MIN: CPT

## 2019-08-15 PROCEDURE — 74011250636 HC RX REV CODE- 250/636: Performed by: ORTHOPAEDIC SURGERY

## 2019-08-15 PROCEDURE — 74011250637 HC RX REV CODE- 250/637: Performed by: PHYSICIAN ASSISTANT

## 2019-08-15 PROCEDURE — 97165 OT EVAL LOW COMPLEX 30 MIN: CPT

## 2019-08-15 RX ORDER — CYCLOBENZAPRINE HCL 10 MG
10 TABLET ORAL
Status: DISCONTINUED | OUTPATIENT
Start: 2019-08-15 | End: 2019-08-16 | Stop reason: HOSPADM

## 2019-08-15 RX ADMIN — ONDANSETRON 4 MG: 2 INJECTION INTRAMUSCULAR; INTRAVENOUS at 11:54

## 2019-08-15 RX ADMIN — HYDROMORPHONE HYDROCHLORIDE 2 MG: 2 TABLET ORAL at 23:51

## 2019-08-15 RX ADMIN — HYDROMORPHONE HYDROCHLORIDE 2 MG: 2 TABLET ORAL at 20:06

## 2019-08-15 RX ADMIN — HYDROMORPHONE HYDROCHLORIDE 1 MG: 1 INJECTION, SOLUTION INTRAMUSCULAR; INTRAVENOUS; SUBCUTANEOUS at 04:37

## 2019-08-15 RX ADMIN — Medication 5 ML: at 15:33

## 2019-08-15 RX ADMIN — Medication 5 ML: at 20:06

## 2019-08-15 RX ADMIN — Medication 1 AMPULE: at 20:06

## 2019-08-15 RX ADMIN — ONDANSETRON 4 MG: 2 INJECTION INTRAMUSCULAR; INTRAVENOUS at 08:28

## 2019-08-15 RX ADMIN — HYDROMORPHONE HYDROCHLORIDE 1 MG: 1 INJECTION, SOLUTION INTRAMUSCULAR; INTRAVENOUS; SUBCUTANEOUS at 08:27

## 2019-08-15 RX ADMIN — Medication 10 ML: at 04:37

## 2019-08-15 RX ADMIN — ONDANSETRON 4 MG: 2 INJECTION INTRAMUSCULAR; INTRAVENOUS at 15:31

## 2019-08-15 RX ADMIN — FAMOTIDINE 20 MG: 20 TABLET ORAL at 08:28

## 2019-08-15 RX ADMIN — DOCUSATE SODIUM 100 MG: 100 CAPSULE, LIQUID FILLED ORAL at 17:34

## 2019-08-15 RX ADMIN — HYDROMORPHONE HYDROCHLORIDE 1 MG: 1 INJECTION, SOLUTION INTRAMUSCULAR; INTRAVENOUS; SUBCUTANEOUS at 11:54

## 2019-08-15 RX ADMIN — ACETAMINOPHEN 650 MG: 325 TABLET, FILM COATED ORAL at 17:34

## 2019-08-15 RX ADMIN — ACETAMINOPHEN 650 MG: 325 TABLET, FILM COATED ORAL at 23:50

## 2019-08-15 RX ADMIN — ACETAMINOPHEN 650 MG: 325 TABLET, FILM COATED ORAL at 04:37

## 2019-08-15 RX ADMIN — DOCUSATE SODIUM 100 MG: 100 CAPSULE, LIQUID FILLED ORAL at 08:28

## 2019-08-15 RX ADMIN — PRAVASTATIN SODIUM 40 MG: 20 TABLET ORAL at 08:28

## 2019-08-15 RX ADMIN — Medication 1 AMPULE: at 08:29

## 2019-08-15 RX ADMIN — HYDROMORPHONE HYDROCHLORIDE 1 MG: 1 INJECTION, SOLUTION INTRAMUSCULAR; INTRAVENOUS; SUBCUTANEOUS at 15:30

## 2019-08-15 RX ADMIN — Medication 5 ML: at 08:31

## 2019-08-15 RX ADMIN — METFORMIN HYDROCHLORIDE 1000 MG: 500 TABLET, FILM COATED ORAL at 17:35

## 2019-08-15 RX ADMIN — ACETAMINOPHEN 650 MG: 325 TABLET, FILM COATED ORAL at 11:55

## 2019-08-15 RX ADMIN — CYCLOBENZAPRINE HYDROCHLORIDE 10 MG: 10 TABLET, FILM COATED ORAL at 17:34

## 2019-08-15 RX ADMIN — Medication 2 G: at 11:55

## 2019-08-15 RX ADMIN — Medication 5 ML: at 11:55

## 2019-08-15 RX ADMIN — LISINOPRIL AND HYDROCHLOROTHIAZIDE 1 TABLET: 12.5; 2 TABLET ORAL at 08:29

## 2019-08-15 RX ADMIN — METFORMIN HYDROCHLORIDE 1000 MG: 500 TABLET, FILM COATED ORAL at 08:28

## 2019-08-15 RX ADMIN — Medication 2 G: at 04:37

## 2019-08-15 NOTE — PROGRESS NOTES
Problem: Self Care Deficits Care Plan (Adult)  Goal: *Acute Goals and Plan of Care (Insert Text)  Description  1. Pt will toilet with SBA   2. Pt will complete functional mobility for ADLs with SBA  3. Pt will complete lower body dressing with SBA using AE as needed  4. Pt will complete grooming and hygiene at sink with SBA  5. Pt will demonstrate independence with HEP to promote increased BUE strength and functional use for ADLs  6. Pt will tolerate 23 minutes functional activity with min or fewer rest breaks to promote increased endurance for ADLs  7. Pt will complete bed mobility with SBA in prep for ADLs  8. Pt will independently demonstrate/ verbalize 100% spinal precautions for ADLs    Timeframe: 7 days     Outcome: Progressing Towards Goal     OCCUPATIONAL THERAPY: Initial Assessment and Daily Note 8/15/2019  INPATIENT: OT Visit Days: 1  Payor: Will Berry / Plan: [address] [place] [address] HMO / Product Type: "[employer] Care Medicare /      NAME/AGE/GENDER: Steve Jaen is a 75 y.o. female   PRIMARY DIAGNOSIS:  Cervical spinal stenosis [M48.02]  Myelomalacia ([facility] [address] [G95.89]  Myelopathy ([facility] [address] [G95.9]  Cervical spondylosis with myelopathy [M47.12] Cervical spinal stenosis   Cervical spinal stenosis    Procedure(s) (LRB):  C4-C6 LAMINECTOMY AND FUSION WITH ALLOGRAFT AND INSTRUMENTATION/ SSEP (N/A)  1 Day Post-Op  ICD-10: Treatment Diagnosis:    Cervicalgia (M54.2)   Precautions/Allergies:    Cervical, falls Keflex [cephalexin]; Oxycodone; and Sulfamethoxazole-trimethoprim      ASSESSMENT:     Ms. Barrera Pineda was admitted with cervical spinal stenosis, s/p C4-6 laminectomy and fusion. Pt lives with her  and is independent at baseline, does not use an AD at home but reaches out to furniture and walls for support. Pt denies having any recent falls. This session, pt presented with deficits in mobility, endurance, strength, and limitations of cervical precautions impacting ADLs.  Pt educated on cervical precautions and on adaptive techniques to maintain during ADLs and mobility for ADLs. Pt stood with CGA, mobilized in room and bathroom with CGA. Pt toileted with CGA, stood and transferred to/ from sink and chair with CGA using. RW. Pt completed hygiene and UB and LB bathing at sink with CGA, fatigued quickly and unable to complete LB bathing thoroughly. Pt donned/ doffed socks with CGA using cross leg technique. BUE strength is slightly decreased with < B FMC, sensation is intact. Pt is below her functional baseline and would benefit from skilled OT services to address deficits. Recommend d/c home with home health. This section established at most recent assessment   PROBLEM LIST (Impairments causing functional limitations):  Decreased Strength  Decreased ADL/Functional Activities  Decreased Transfer Abilities  Decreased Balance  Increased Pain  Decreased Activity Tolerance  Increased Fatigue  Decreased Flexibility/Joint Mobility   INTERVENTIONS PLANNED: (Benefits and precautions of occupational therapy have been discussed with the patient.)  Activities of daily living training  Adaptive equipment training  Balance training  Therapeutic activity  Therapeutic exercise     TREATMENT PLAN: Frequency/Duration: Follow patient 3 times/ week to address above goals. Rehabilitation Potential For Stated Goals: Good     REHAB RECOMMENDATIONS (at time of discharge pending progress):    Placement: It is my opinion, based on this patient's performance to date, that Ms. Vadim Costello may benefit from d/c home with home health .   Equipment:   None at this time              OCCUPATIONAL PROFILE AND HISTORY:   History of Present Injury/Illness (Reason for Referral):  See H&P  Past Medical History/Comorbidities:   Ms. Vadim Costello  has a past medical history of Diabetes ([facility] [address] GERD (gastroesophageal reflux disease), Hypercholesterolemia, Hypertension, Idiopathic chronic gout of left knee without tophus (5/23/2018), Mixed hyperlipidemia (5/11/2015), Seizures ([facility] [address] and Type 2 diabetes mellitus without complication, without long-term current use of insulin ([facility] [address] (5/11/2015). Ms. Marylou Lane  has a past surgical history that includes hx tubal ligation; hx other surgical; and hx knee replacement (Right).   Social History/Living Environment:   Home Environment: Private residence  # Steps to Enter: 1  One/Two Story Residence: Two story, live on 1st floor  Living Alone: No  Support Systems: Spouse/Significant Other/Partner  Patient Expects to be Discharged to[de-identified] Private residence  Current DME Used/Available at Home: Low Groom, straight, Walker, rollator, Commode, bedside, Walker, rolling  Tub or Shower Type: Tub/Shower combination  Prior Level of Function/Work/Activity:  Independent, lives with      Number of Personal Factors/Comorbidities that affect the Plan of Care: Expanded review of therapy/medical records (1-2):  MODERATE COMPLEXITY   ASSESSMENT OF OCCUPATIONAL PERFORMANCE[de-identified]   Activities of Daily Living:   Basic ADLs (From Assessment) Complex ADLs (From Assessment)   Feeding: Setup  Oral Facial Hygiene/Grooming: Contact guard assistance  Bathing: Minimum assistance  Upper Body Dressing: Stand-by assistance  Lower Body Dressing: Contact guard assistance  Toileting: Contact guard assistance     Grooming/Bathing/Dressing Activities of Daily Living   Grooming  Grooming Assistance: Contact guard assistance Cognitive Retraining  Safety/Judgement: Awareness of environment           Toileting  Toileting Assistance: Contact guard assistance     Functional Transfers  Bathroom Mobility: Contact guard assistance  Toilet Transfer : Contact guard assistance   Lower Body Dressing Assistance  Socks: Contact guard assistance Bed/Mat Mobility  Rolling: Minimum assistance  Supine to Sit: Minimum assistance  Sit to Supine: Minimum assistance  Sit to Stand: Contact guard assistance  Stand to Sit: Contact guard assistance  Bed to Chair: Contact guard assistance  Scooting: Minimum assistance     Most Recent Physical Functioning:   Gross Assessment:  AROM: Within functional limits  Strength: Generally decreased, functional  Coordination: Generally decreased, functional  Sensation: Intact               Posture:     Balance:  Sitting: Impaired  Sitting - Static: Good (unsupported)  Sitting - Dynamic: Fair (occasional)  Standing: Impaired  Standing - Static: Fair  Standing - Dynamic : Fair Bed Mobility:  Rolling: Minimum assistance  Supine to Sit: Minimum assistance  Sit to Supine: Minimum assistance  Scooting: Minimum assistance  Wheelchair Mobility:     Transfers:  Sit to Stand: Contact guard assistance  Stand to Sit: Contact guard assistance  Bed to Chair: Contact guard assistance  Interventions: Tactile cues; Verbal cues; Safety awareness training            Patient Vitals for the past 6 hrs:   BP BP Patient Position SpO2 Pulse   08/15/19 0753 147/82 At rest 93 % 91   08/15/19 1155 155/82 At rest 97 % 80       Mental Status  Neurologic State: Alert  Orientation Level: Oriented X4  Cognition: Follows commands  Perception: Appears intact  Perseveration: No perseveration noted  Safety/Judgement: Awareness of environment                          Physical Skills Involved:  Balance  Strength  Activity Tolerance  Fine Motor Control  Pain (acute) Cognitive Skills Affected (resulting in the inability to perform in a timely and safe manner):  none  Psychosocial Skills Affected:  Habits/Routines  Environmental Adaptation   Number of elements that affect the Plan of Care: 3-5:  MODERATE COMPLEXITY   CLINICAL DECISION MAKIN [facility] [address] [address] AM-PAC 6 Clicks   Daily Activity Inpatient Short Form  How much help from another person does the patient currently need. .. Total A Lot A Little None   1. Putting on and taking off regular lower body clothing? ? 1   ? 2   ? 3   ? 4   2. Bathing (including washing, rinsing, drying)? ? 1   ? 2   ? 3   ? 4   3.   Toileting, which includes using toilet, bedpan or urinal?   ? 1   ? 2   ? 3   ? 4   4. Putting on and taking off regular upper body clothing? ? 1   ? 2   ? 3   ? 4   5. Taking care of personal grooming such as brushing teeth? ? 1   ? 2   ? 3   ? 4   6. Eating meals? ? 1   ? 2   ? 3   ? 4   © 2007, Trustees of [address] [place] [id number], under license to [employer]. All rights reserved      Score:  Initial: 18 Most Recent: X (Date: -- )    Interpretation of Tool:  Represents activities that are increasingly more difficult (i.e. Bed mobility, Transfers, Gait). Medical Necessity:     Patient demonstrates good   rehab potential due to higher previous functional level. Reason for Services/Other Comments:  Patient continues to require present interventions due to patient's inability to complete ADLs   . Use of outcome tool(s) and clinical judgement create a POC that gives a: MODERATE COMPLEXITY         TREATMENT:   (In addition to Assessment/Re-Assessment sessions the following treatments were rendered)     Pre-treatment Symptoms/Complaints:    Pain: Initial:   Pain Intensity 1: 8  Pain Location 1: Neck  Pain Intervention(s) 1: Repositioned  Post Session:  8     Self Care: (15 min): Procedure(s) (per grid) utilized to improve and/or restore self-care/home management as related to dressing, bathing, toileting and grooming. Required minimal   cueing to facilitate activities of daily living skills and compensatory activities. Braces/Orthotics/Lines/Etc:   O2 Device: Nasal cannula  Treatment/Session Assessment:    Response to Treatment:  no adverse reaction   Interdisciplinary Collaboration:   Occupational Therapist  Registered Nurse  After treatment position/precautions:   Up in chair  Supine in bed  Bed/Chair-wheels locked  Bed in low position  Call light within reach  RN notified  Family at bedside   Compliance with Program/Exercises: Compliant all of the time. Recommendations/Intent for next treatment session:   \"Next visit will focus on advancements to more challenging activities and reduction in assistance provided\".   Total Treatment Duration:  OT Patient Time In/Time Out  Time In: 1044  Time Out: 5[facility]

## 2019-08-15 NOTE — PROGRESS NOTES
POD 1    AF,VSS  Patient says arms feel better but complains of surgery pain, not yet walked  Moves upper and lower extremities well  Dressing benign  Start PT but will probably need snf/rehab

## 2019-08-15 NOTE — PROGRESS NOTES
Problem: Falls - Risk of  Goal: *Absence of Falls  Description  Document [place] Nicole Fall Risk and appropriate interventions in the flowsheet.   Outcome: Progressing Towards Goal  Note:   Fall Risk Interventions:  Mobility Interventions: Bed/chair exit alarm, OT consult for ADLs, Patient to call before getting OOB, PT Consult for mobility concerns    Mentation Interventions: Adequate sleep, hydration, pain control, Bed/chair exit alarm    Medication Interventions: Bed/chair exit alarm, Evaluate medications/consider consulting pharmacy, Patient to call before getting OOB, Teach patient to arise slowly    Elimination Interventions: Call light in reach, Bed/chair exit alarm, Patient to call for help with toileting needs, Stay With Me (per policy)    History of Falls Interventions: Bed/chair exit alarm, Door open when patient unattended, Investigate reason for fall         Problem: Patient Education: Go to Patient Education Activity  Goal: Patient/Family Education  Outcome: Progressing Towards Goal     Problem: Simple Spine Procedure:  Day of Surgery  Goal: Activity/Safety  Outcome: Progressing Towards Goal  Goal: Consults, if ordered  Outcome: Progressing Towards Goal  Goal: Nutrition/Diet  Outcome: Progressing Towards Goal  Goal: Discharge Planning  Outcome: Progressing Towards Goal  Goal: Medications  Outcome: Progressing Towards Goal  Goal: Respiratory  Outcome: Progressing Towards Goal  Goal: Treatments/Interventions/Procedures  Outcome: Progressing Towards Goal  Goal: Psychosocial  Outcome: Progressing Towards Goal  Goal: *Verbalizes understanding of type and use of pain medication  Outcome: Progressing Towards Goal  Goal: *Optimal pain control at patient's stated goal  Outcome: Progressing Towards Goal  Goal: *Verbalizes/demonstrates understanding of proper body mechanics and use of stabilization device if ordered  Outcome: Progressing Towards Goal  Goal: *Activity level attained as ordered  Outcome: Progressing Towards Goal  Goal: *Active bowel sounds  Outcome: Progressing Towards Goal  Goal: *Respiratory status stable  Outcome: Progressing Towards Goal  Goal: *Adequate urinary output  Outcome: Progressing Towards Goal  Goal: *Hemodynamically stable  Outcome: Progressing Towards Goal

## 2019-08-15 NOTE — PROGRESS NOTES
Problem: Mobility Impaired (Adult and Pediatric)  Goal: *Acute Goals and Plan of Care  Description  Acute PT Goals:  (1.)Kelly Bailon  will move from supine to sit and sit to supine , scoot up and down and roll side to side with MODIFIED INDEPENDENCE utilizing log roll technique within 7 treatment day(s). (2.)Kelly Bailon will transfer from bed to chair and chair to bed with MODIFIED INDEPENDENCE using the least restrictive device within 7 treatment day(s). (3.)Kelly Bailon will ambulate with MODIFIED INDEPENDENCE for 300+ feet with the least restrictive device within 7 treatment day(s). (4.)Kelly Bailon will perform standing static and dynamic balance activities x 23 minutes with MODIFIED INDEPENDENCE to improve safety within 7 treatment day(s). (5.)Kelly Bailon will ascend and descend 1 stair(s) using no hand rail(s) with MODIFIED INDEPENDENCE to improve functional mobility and safety within 7 treatment day(s). (6.)Kelly Bailon will perform bilateral lower extremity exercises x 15 min for HEP with MODIFIED INDEPENDENCE to improve strength, endurance, and functional mobility within 7 treatment day(s).       PHYSICAL THERAPY: Daily Note and PM 8/15/2019  INPATIENT: PT Visit Days : 1  Payor: Jenelle Zavaleta / Plan: [address] [place] [address] HMO / Product Type: [employer] Medicare /       NAME/AGE/GENDER: Pipo Cornejo is a 75 y.o. female   PRIMARY DIAGNOSIS: Cervical spinal stenosis [M48.02]  Myelomalacia ([facility] [address] [G95.89]  Myelopathy ([facility] [address] [G95.9]  Cervical spondylosis with myelopathy [M47.12] Cervical spinal stenosis   Cervical spinal stenosis    Procedure(s) (LRB):  C4-C6 LAMINECTOMY AND FUSION WITH ALLOGRAFT AND INSTRUMENTATION/ SSEP (N/A)  1 Day Post-Op  ICD-10: Treatment Diagnosis:   · Generalized Muscle Weakness (M62.81)  · Difficulty in walking, Not elsewhere classified (R26.2)  · Other abnormalities of gait and mobility (R26.89)  · Cervicalgia (M54.2)   Precaution/Allergies:  Keflex [cephalexin]; Oxycodone; and Sulfamethoxazole-trimethoprim      ASSESSMENT:     Ms. Joanna Pedro is a 75year old F who presents s/p C4-C6 laminectomy and fusion. Prior to hospital admission pt lives with her  in a twoy story home (lives on first floor) with one step(s) to enter. Pt endorses no falls in past 6 months. Prior to admission Ms. Esquivel uses a rollator walker or a straight cane for mobility, does report she furniture and wall walks at home. PM Note: Upon entering, pt supine in bed, sister in room, agreeable to PT treatment. she reports 8/10 pain in her posterior neck at rest. Pt performed supine > sit with Min A with verbal and tactile instruction of log roll technique, sitting EOB with good sitting balance control. Sit > stand with CGA/RW, verbal cues for sequencing. Transfer to bedside chair CGA/RW where PT instructed pt in BLE strengthening exercises and progressed as indicated. Gait x 70 ft with CGA/RW, cues for safety progressing walker, upright posture. Pt with improved gait renetta, upright posture and steadiness this afternoon. At end of session pt sitting in bedside chair with all needs within reach, alarm activated for safety, RN notified. Pt made good progress with mobility this session, progressing well towards stated goals. Pt presents as functioning below her baseline, with deficits in mobility including transfers, gait, balance, and activity tolerance. Pt will benefit from skilled therapy services to address stated deficits to promote return to highest level of function, independence, and safety. Will continue to follow. This section established at most recent assessment   PROBLEM LIST (Impairments causing functional limitations):  1. Decreased Strength  2. Decreased Transfer Abilities  3. Decreased Ambulation Ability/Technique  4. Decreased Balance  5. Increased Pain  6.  Decreased Activity Tolerance INTERVENTIONS PLANNED: (Benefits and precautions of physical therapy have been discussed with the patient.)  1. Balance Exercise  2. Bed Mobility  3. Family Education  4. Gait Training  5. Home Exercise Program (HEP)  6. Neuromuscular Re-education/Strengthening  7. Range of Motion (ROM)  8. Therapeutic Activites  9. Therapeutic Exercise/Strengthening  10. Transfer Training     TREATMENT PLAN: Frequency/Duration: twice daily for duration of hospital stay  Rehabilitation Potential For Stated Goals: Good     REHAB RECOMMENDATIONS (at time of discharge pending progress):    Placement: It is my opinion, based on this patient's performance to date, that Ms. Christen Jacobsen may benefit from being discharged with NO further skilled therapy due to the high likelihood of returning to baseline vs [address] pending progress and mobility with therapy. Equipment:    None at this time              HISTORY:   History of Present Injury/Illness (Reason for Referral): Pt presents /sp C4-C6 laminectomy and fusion after presenting with myelopathy, gait disturbance, and limb weakness/numbness prior. Past Medical History/Comorbidities:   Ms. Christen Jacobsen  has a past medical history of Diabetes ([address] GERD (gastroesophageal reflux disease), Hypercholesterolemia, Hypertension, Idiopathic chronic gout of left knee without tophus (5/23/2018), Mixed hyperlipidemia (5/11/2015), Seizures ([address] and Type 2 diabetes mellitus without complication, without long-term current use of insulin ([address] (5/11/2015). Ms. Christen Jacobsen  has a past surgical history that includes hx tubal ligation; hx other surgical; and hx knee replacement (Right).   Social History/Living Environment:   Home Environment: Private residence  # Steps to Enter: 1  One/Two Story Residence: Two story, live on 1st floor  Living Alone: No  Support Systems: Spouse/Significant Other/Partner  Patient Expects to be Discharged to[de-identified] Private residence  Current DME Used/Available at Home: Katerin Phipps, john paul, Walker, rollator, Commode, bedside, Walker, rolling  Tub or Shower Type: Tub/Shower combination  Prior Level of Function/Work/Activity:  Mod I with use of rollator and/or straight cane, lives with  with bedroom and bathroom on first floor of home     Number of Personal Factors/Comorbidities that affect the Plan of Care: 1-2: MODERATE COMPLEXITY   EXAMINATION:   Most Recent Physical Functioning:   Gross Assessment:  AROM: Within functional limits  Strength: Generally decreased, functional  Coordination: Generally decreased, functional  Sensation: Intact               Posture:     Balance:  Sitting: Impaired  Sitting - Static: Good (unsupported)  Sitting - Dynamic: Good (unsupported)  Standing: Impaired  Standing - Static: Good  Standing - Dynamic : Fair Bed Mobility:  Rolling: Contact guard assistance  Supine to Sit: Minimum assistance  Sit to Supine: Minimum assistance  Scooting: Contact guard assistance  Interventions: Safety awareness training; Tactile cues  Wheelchair Mobility:     Transfers:  Sit to Stand: Contact guard assistance  Stand to Sit: Contact guard assistance  Bed to Chair: Contact guard assistance  Interventions: Safety awareness training; Tactile cues; Verbal cues  Gait:     Base of Support: Center of gravity altered  Speed/Zuleika: Shuffled  Step Length: Right shortened;Left shortened  Gait Abnormalities: Decreased step clearance; Path deviations;Trunk sway increased  Distance (ft): 70 Feet (ft)  Assistive Device: Walker, rolling;Gait belt  Ambulation - Level of Assistance: Contact guard assistance      Body Structures Involved:  1. Nerves  2. Bones  3. Joints  4. Muscles Body Functions Affected:  1. Sensory/Pain  2. Neuromusculoskeletal  3. Movement Related Activities and Participation Affected:  1. General Tasks and Demands  2.  Mobility   Number of elements that affect the Plan of Care: 3: MODERATE COMPLEXITY   CLINICAL PRESENTATION:   Presentation: Stable and uncomplicated: LOW COMPLEXITY   CLINICAL DECISION MAKIN[address] [place], [place] [id number] AM-PAC 6 Clicks   Basic Mobility Inpatient Short Form  How much difficulty does the patient currently have. .. Unable A Lot A Little None   1. Turning over in bed (including adjusting bedclothes, sheets and blankets)? ? 1   ? 2   ? 3   ? 4   2. Sitting down on and standing up from a chair with arms ( e.g., wheelchair, bedside commode, etc.)   ? 1   ? 2   ? 3   ? 4   3. Moving from lying on back to sitting on the side of the bed?   ? 1   ? 2   ? 3   ? 4   How much help from another person does the patient currently need. .. Total A Lot A Little None   4. Moving to and from a bed to a chair (including a wheelchair)? ? 1   ? 2   ? 3   ? 4   5. Need to walk in hospital room? ? 1   ? 2   ? 3   ? 4   6. Climbing 3-5 steps with a railing? ? 1   ? 2   ? 3   ? 4   © 2007, Trustees of [address] [place], [place] [address], under license to [employer]. All rights reserved      Score:  Initial: 18 Most Recent: X (Date: -- )    Interpretation of Tool:  Represents activities that are increasingly more difficult (i.e. Bed mobility, Transfers, Gait). Medical Necessity:     · Patient is expected to demonstrate progress in strength, range of motion, balance, coordination and functional technique  ·  to increase independence with all mobility and promote return to prior level of function. · .  Reason for Services/Other Comments:  · Patient continues to require skilled intervention due to medical complications and mobility deficits which impact her level of function, safety, and independence as indicated above. · .   Use of outcome tool(s) and clinical judgement create a POC that gives a: Clear prediction of patient's progress: LOW COMPLEXITY        TREATMENT:   (In addition to Assessment/Re-Assessment sessions the following treatments were rendered)   Pre-treatment Symptoms/Complaints:  Ready to get up and moving.   Pain: Initial:   Pain Intensity 1: 8  Pain Location 1: Neck  Pain Orientation 1: Posterior  Post Session:  Unchanged. RN notified. Therapeutic Activity: (    26 minutes): Therapeutic activities including Bed transfers, Chair transfers, Ambulation on level ground and strengthening BLE exercises and safety awareness training including instruction on precautions and mobility  to improve mobility, strength, balance and coordination. Required minimal   cues/instruction with CGA to promote static and dynamic balance in standing and promote coordination of bilateral, lower extremity(s). Date:  8/15/19 Date:   Date:     ACTIVITY/EXERCISE AM PM AM PM AM PM   Seated AP 1x10 AB 1x15 AB       Seated LAQ 1x10 AB 1x15 AB       Seated Marches 1x10 AB 1x10 AB       Sit to Stands X4 reps 1x6 reps                                  B = bilateral; AA = active assistive; A = active; P = passive    Braces/Orthotics/Lines/Etc:   · O2 Device: Room Air  Treatment/Session Assessment:    · Response to Treatment:  See above; progressing well towards stated goals  · Interdisciplinary Collaboration:   o Physical Therapist  o Registered Nurse  · After treatment position/precautions:   o Up in chair  o Bed alarm/tab alert on  o Bed/Chair-wheels locked  o Bed in low position  o Call light within reach  o RN notified   · Compliance with Program/Exercises: Will assess as treatment progresses  · Recommendations/Intent for next treatment session: \"Next visit will focus on advancements to more challenging activities and reduction in assistance provided\".     Total Treatment Duration:  PT Patient Time In/Time Out  Time In: 1328  Time Out: [facility],

## 2019-08-15 NOTE — PROGRESS NOTES
Met with pt and sister at bedside, pt found sitting up in bedside chair, pt is alert and oriented x3, states lives with spouse in 1 level home with 1 step to enter has rollator walker, cane in home per sister, states pt will not be left alone at home she will be staying with her, states prior to surgery pt was independent with ADLS and drives, Demographics confirmed, PCP confirmed. Pending PT/OT evals to get recommendations for DC needs. None anticipated at this time. CM will continue to follow. Care Management Interventions  PCP Verified by CM:  Yes  Current Support Network: Lives with Spouse, Own Home  Confirm Follow Up Transport: Family  Plan discussed with Pt/Family/Caregiver: Yes  Freedom of Choice Offered: Yes  Discharge Location  Discharge Placement: Home

## 2019-08-15 NOTE — PROGRESS NOTES
Spiritual Care Visit, initial visit. Visited with patient at bedside. Prayed for patient's healing and health. Visit by Surendra Madden, Staff .  Jenifer., Th.B., B.A.

## 2019-08-15 NOTE — PROGRESS NOTES
Problem: Mobility Impaired (Adult and Pediatric)  Goal: *Acute Goals and Plan of Care  Description  Acute PT Goals:  (1.)Kelly Lazaro  will move from supine to sit and sit to supine , scoot up and down and roll side to side with MODIFIED INDEPENDENCE utilizing log roll technique within 7 treatment day(s). (2.)Kelly Lazaro will transfer from bed to chair and chair to bed with MODIFIED INDEPENDENCE using the least restrictive device within 7 treatment day(s). (3.)Kelly Lazaro will ambulate with MODIFIED INDEPENDENCE for 300+ feet with the least restrictive device within 7 treatment day(s). (4.)Kelly Lazaro will perform standing static and dynamic balance activities x 23 minutes with MODIFIED INDEPENDENCE to improve safety within 7 treatment day(s). (5.)Kelly Lazaro will ascend and descend 1 stair(s) using no hand rail(s) with MODIFIED INDEPENDENCE to improve functional mobility and safety within 7 treatment day(s). (6.)Kelly Lazaro will perform bilateral lower extremity exercises x 15 min for HEP with MODIFIED INDEPENDENCE to improve strength, endurance, and functional mobility within 7 treatment day(s).       PHYSICAL THERAPY: Initial Assessment, Daily Note and AM 8/15/2019  INPATIENT: PT Visit Days : 1  Payor: Joe Diaz / Plan: [address] [place] [address] HMO / Product Type: [employer] Medicare /       NAME/AGE/GENDER: Ashley Landaverde is a 75 y.o. female   PRIMARY DIAGNOSIS: Cervical spinal stenosis [M48.02]  Myelomalacia ([facility] [address] [G95.89]  Myelopathy ([facility] [address] [G95.9]  Cervical spondylosis with myelopathy [M47.12] Cervical spinal stenosis   Cervical spinal stenosis    Procedure(s) (LRB):  C4-C6 LAMINECTOMY AND FUSION WITH ALLOGRAFT AND INSTRUMENTATION/ SSEP (N/A)  1 Day Post-Op  ICD-10: Treatment Diagnosis:   Generalized Muscle Weakness (M62.81)  Difficulty in walking, Not elsewhere classified (R26.2)  Other abnormalities of gait and mobility (R26.89)  Cervicalgia (M54.2)   Precaution/Allergies:  Keflex [cephalexin]; Oxycodone; and Sulfamethoxazole-trimethoprim      ASSESSMENT:     Ms. Claudeen Pean is a 75year old F who presents s/p C4-C6 laminectomy and fusion. Prior to hospital admission pt lives with her  in a twoy story home (lives on first floor) with one step(s) to enter. Pt endorses no falls in past 6 months. Prior to admission Ms. Esquivel uses a rollator walker or a straight cane for mobility, does report she furniture and wall walks at home. Upon entering, pt supine in bed, sister in room, agreeable to PT evaluation. she reports 8/10 pain in her posterior neck at rest. BLE assessment indicates sensation to light touch intact, AROM WFL, and strength diminished, but functional. Pt performed supine > sit with Min A with verbal and tactile instruction of log roll technique, sitting EOB with good sitting balance control. Treatment initiated to include instruction of post-op cervical precautions; pt verbalized understanding. Sit > stand with CGA and increased time using Rw; verbal cues for sequencing. Gait x 15 ft with CGA/RW, cues for safety progressing walker, upright posture. Pt with slowed gait renetta and decreased step length. Upon returning to bedside chair, PT instructed pt in scooting technique for comfortable position, as well as BEL strengthening exercises to promote mobility, strength, and coordination. At end of session pt sitting in bedside chair with all needs within reach, alarm activated for safety, RN notified. Pt presents as functioning below her baseline, with deficits in mobility including transfers, gait, balance, and activity tolerance. Pt will benefit from skilled therapy services to address stated deficits to promote return to highest level of function, independence, and safety. Will continue to follow.     This section established at most recent assessment   PROBLEM LIST (Impairments causing functional limitations):  Decreased Strength  Decreased Transfer Abilities  Decreased Ambulation Ability/Technique  Decreased Balance  Increased Pain  Decreased Activity Tolerance   INTERVENTIONS PLANNED: (Benefits and precautions of physical therapy have been discussed with the patient.)  Balance Exercise  Bed Mobility  Family Education  Gait Training  Home Exercise Program (HEP)  Neuromuscular Re-education/Strengthening  Range of Motion (ROM)  Therapeutic Activites  Therapeutic Exercise/Strengthening  Transfer Training     TREATMENT PLAN: Frequency/Duration: twice daily for duration of hospital stay  Rehabilitation Potential For Stated Goals: Good     REHAB RECOMMENDATIONS (at time of discharge pending progress):    Placement: It is my opinion, based on this patient's performance to date, that Ms. Shaan Verdin may benefit from being discharged with NO further skilled therapy due to the high likelihood of returning to baseline. Equipment:   None at this time              HISTORY:   History of Present Injury/Illness (Reason for Referral): Pt presents /sp C4-C6 laminectomy and fusion after presenting with myelopathy, gait disturbance, and limb weakness/numbness prior. Past Medical History/Comorbidities:   Ms. 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 Ms. Shaan Verdin  has a past surgical history that includes hx tubal ligation; hx other surgical; and hx knee replacement (Right).   Social History/Living Environment:   Home Environment: Private residence  # Steps to Enter: 1  One/Two Story Residence: Two story, live on 1st floor  Living Alone: No  Support Systems: Spouse/Significant Other/Partner, Family member(s)  Patient Expects to be Discharged to[de-identified] Private residence  Current DME Used/Available at Home: Daksha Mckeon, john paul, Walker, rollator  Prior Level of Function/Work/Activity:  Mod I with use of rollator and/or straight cane, lives with  with bedroom and bathroom on first floor of home     Number of Personal Factors/Comorbidities that affect the Plan of Care: 1-2: MODERATE COMPLEXITY   EXAMINATION:   Most Recent Physical Functioning:   Gross Assessment:  AROM: Within functional limits  Strength: Generally decreased, functional  Coordination: Generally decreased, functional  Sensation: Intact               Posture:     Balance:  Sitting: Impaired  Sitting - Static: Good (unsupported)  Sitting - Dynamic: Fair (occasional)  Standing: Impaired  Standing - Static: Fair  Standing - Dynamic : Fair Bed Mobility:  Rolling: Minimum assistance  Supine to Sit: Minimum assistance  Sit to Supine: Minimum assistance  Scooting: Minimum assistance  Wheelchair Mobility:     Transfers:  Sit to Stand: Contact guard assistance  Stand to Sit: Contact guard assistance  Bed to Chair: Contact guard assistance  Interventions: Tactile cues; Verbal cues; Safety awareness training  Gait:     Base of Support: Center of gravity altered  Speed/Zuleika: Shuffled; Slow  Step Length: Left shortened;Right shortened  Gait Abnormalities: Decreased step clearance; Path deviations;Trunk sway increased  Distance (ft): 15 Feet (ft)  Assistive Device: Walker, rolling;Gait belt  Ambulation - Level of Assistance: Contact guard assistance      Body Structures Involved:  Nerves  Bones  Joints  Muscles Body Functions Affected:  Sensory/Pain  Neuromusculoskeletal  Movement Related Activities and Participation Affected:  General Tasks and Demands  Mobility   Number of elements that affect the Plan of Care: 3: MODERATE COMPLEXITY   CLINICAL PRESENTATION:   Presentation: Stable and uncomplicated: LOW COMPLEXITY   CLINICAL DECISION MAKIN [facility] [address] AM-PAC 6 Clicks   Basic Mobility Inpatient Short Form  How much difficulty does the patient currently have. .. Unable A Lot A Little None   1. Turning over in bed (including adjusting bedclothes, sheets and blankets)? ? 1   ? 2   ? 3   ? 4   2. Sitting down on and standing up from a chair with arms ( e.g., wheelchair, bedside commode, etc.)   ? 1   ? 2   ? 3   ? 4   3. Moving from lying on back to sitting on the side of the bed?   ? 1   ? 2   ? 3   ? 4   How much help from another person does the patient currently need. .. Total A Lot A Little None   4. Moving to and from a bed to a chair (including a wheelchair)? ? 1   ? 2   ? 3   ? 4   5. Need to walk in hospital room? ? 1   ? 2   ? 3   ? 4   6. Climbing 3-5 steps with a railing? ? 1   ? 2   ? 3   ? 4   © 2007, Trustees of [address] [place] [address], under license to [employer]. All rights reserved      Score:  Initial: 18 Most Recent: X (Date: -- )    Interpretation of Tool:  Represents activities that are increasingly more difficult (i.e. Bed mobility, Transfers, Gait). Medical Necessity:     Patient is expected to demonstrate progress in strength, range of motion, balance, coordination and functional technique   to increase independence with all mobility and promote return to prior level of function. .  Reason for Services/Other Comments:  Patient continues to require skilled intervention due to medical complications and mobility deficits which impact her level of function, safety, and independence as indicated above. .   Use of outcome tool(s) and clinical judgement create a POC that gives a: Clear prediction of patient's progress: LOW COMPLEXITY        TREATMENT:   (In addition to Assessment/Re-Assessment sessions the following treatments were rendered)   Pre-treatment Symptoms/Complaints:  No complaints, agreeable to PT  Pain: Initial:   Pain Intensity 1: 8  Pain Location 1: Neck  Pain Orientation 1: Posterior  Post Session:  Unchanged.  RN aware; pt had just received pain medication     Therapeutic Activity: (    25 minutes): Therapeutic activities including Bed transfers, Chair transfers, Ambulation on level ground and strengthening BLE exercises and safety awareness training including instruction on precautions and mobility  to improve mobility, strength, balance and coordination. Required minimal   cues/instruction with CGA to promote static and dynamic balance in standing and promote coordination of bilateral, lower extremity(s). Date:  8/15/19 Date:   Date:     ACTIVITY/EXERCISE AM PM AM PM AM PM   Seated AP 1x10 AB        Seated LAQ 1x10 AB        Seated Marches 1x10 AB        Sit to Stands X4 reps                                   B = bilateral; AA = active assistive; A = active; P = passive    Braces/Orthotics/Lines/Etc:   drain lumbar   O2 Device: Room Air   Treatment/Session Assessment:    Response to Treatment:  See above  Interdisciplinary Collaboration:   Physical Therapist  Registered Nurse  After treatment position/precautions:   Up in chair  Bed alarm/tab alert on  Bed/Chair-wheels locked  Bed in low position  Call light within reach  RN notified   Compliance with Program/Exercises: Will assess as treatment progresses  Recommendations/Intent for next treatment session: \"Next visit will focus on advancements to more challenging activities and reduction in assistance provided\".     Total Treatment Duration:  PT Patient Time In/Time Out  Time In: 1425  Time Out: 2 Roger Baez PT

## 2019-08-15 NOTE — PROGRESS NOTES
BERYL POST OP PROGRESS NOTE    August 15, 2019  Admit Date: 2019  Admit Diagnosis: Cervical spinal stenosis [M48.02]  Myelomalacia ([facility] [address] [G95.89]  Myelopathy ([facility] [address] [G95.9]  Cervical spondylosis with myelopathy [M47.12]  Procedure: Procedure(s):  C4-C6 LAMINECTOMY AND FUSION WITH ALLOGRAFT AND INSTRUMENTATION/ SSEP  Post Op day: 1 Day Post-Op      Subjective:     Kalyani Ventura is a patient who has no complaints. Objective:     Vital Signs:    Blood pressure 147/82, pulse 91, temperature 98.5 °F (36.9 °C), resp. rate 18, height 5' 4\" (1.626 m), weight 76.2 kg (168 lb 1.6 oz), SpO2 93 %. Temp (24hrs), Av.2 °F (36.8 °C), Min:97.8 °F (36.6 °C), Max:98.6 °F (37 °C)      No intake/output data recorded.  1901 - 08/15 0700  In: 1040 [P.O.:240; I.V.:800]  Out: 905 [Urine:755; Drains:50]    LAB:    No results for input(s): HGB, WBC, PLT, HGBEXT, PLTEXT in the last 72 hours. Physical Exam    General:   Alert and oriented. No acute distress  Lungs:  Respirations unlabored. Extremities: No evidence of cyanosis. Calves soft, nontender. Moves both upper and lower extremities.    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

## 2019-08-16 VITALS
RESPIRATION RATE: 18 BRPM | DIASTOLIC BLOOD PRESSURE: 75 MMHG | HEART RATE: 102 BPM | HEIGHT: 64 IN | BODY MASS INDEX: 28.7 KG/M2 | TEMPERATURE: 98.9 F | SYSTOLIC BLOOD PRESSURE: 127 MMHG | WEIGHT: 168.1 LBS | OXYGEN SATURATION: 94 %

## 2019-08-16 LAB — GLUCOSE BLD STRIP.AUTO-MCNC: 118 MG/DL (ref 65–100)

## 2019-08-16 PROCEDURE — 97530 THERAPEUTIC ACTIVITIES: CPT

## 2019-08-16 PROCEDURE — 82962 GLUCOSE BLOOD TEST: CPT

## 2019-08-16 PROCEDURE — 74011250636 HC RX REV CODE- 250/636: Performed by: ORTHOPAEDIC SURGERY

## 2019-08-16 PROCEDURE — 74011250637 HC RX REV CODE- 250/637: Performed by: ORTHOPAEDIC SURGERY

## 2019-08-16 PROCEDURE — 97110 THERAPEUTIC EXERCISES: CPT

## 2019-08-16 PROCEDURE — 74011250637 HC RX REV CODE- 250/637: Performed by: PHYSICIAN ASSISTANT

## 2019-08-16 RX ADMIN — ONDANSETRON 4 MG: 2 INJECTION INTRAMUSCULAR; INTRAVENOUS at 08:25

## 2019-08-16 RX ADMIN — CYCLOBENZAPRINE HYDROCHLORIDE 10 MG: 10 TABLET, FILM COATED ORAL at 08:23

## 2019-08-16 RX ADMIN — HYDROMORPHONE HYDROCHLORIDE 2 MG: 2 TABLET ORAL at 05:37

## 2019-08-16 RX ADMIN — HYDROMORPHONE HYDROCHLORIDE 1 MG: 1 INJECTION, SOLUTION INTRAMUSCULAR; INTRAVENOUS; SUBCUTANEOUS at 08:25

## 2019-08-16 RX ADMIN — ACETAMINOPHEN 650 MG: 325 TABLET, FILM COATED ORAL at 05:36

## 2019-08-16 RX ADMIN — Medication 5 ML: at 08:28

## 2019-08-16 RX ADMIN — Medication 1 AMPULE: at 08:24

## 2019-08-16 RX ADMIN — DOCUSATE SODIUM 100 MG: 100 CAPSULE, LIQUID FILLED ORAL at 08:25

## 2019-08-16 RX ADMIN — HYDROMORPHONE HYDROCHLORIDE 1 MG: 1 INJECTION, SOLUTION INTRAMUSCULAR; INTRAVENOUS; SUBCUTANEOUS at 11:57

## 2019-08-16 RX ADMIN — METFORMIN HYDROCHLORIDE 1000 MG: 500 TABLET, FILM COATED ORAL at 08:25

## 2019-08-16 RX ADMIN — LISINOPRIL AND HYDROCHLOROTHIAZIDE 1 TABLET: 12.5; 2 TABLET ORAL at 08:24

## 2019-08-16 RX ADMIN — Medication 10 ML: at 05:36

## 2019-08-16 RX ADMIN — FAMOTIDINE 20 MG: 20 TABLET ORAL at 08:23

## 2019-08-16 RX ADMIN — ACETAMINOPHEN 650 MG: 325 TABLET, FILM COATED ORAL at 11:57

## 2019-08-16 RX ADMIN — PRAVASTATIN SODIUM 40 MG: 20 TABLET ORAL at 08:25

## 2019-08-16 NOTE — PROGRESS NOTES
SW consult received to inquire if pt has access to home health or rehab at discharge? Chart reviewed. Pt has [employer] medicare advantage plan which has both a home health and STR benefit as long as pt meets the medical criteria for these services. Initial CM assessment dc plan is home with family at discharge. SW remains available to assist if pt's needs change.

## 2019-08-16 NOTE — PROGRESS NOTES
Problem: Mobility Impaired (Adult and Pediatric)  Goal: *Acute Goals and Plan of Care  Description  Acute PT Goals:  (1.)Kelly Mcgraw  will move from supine to sit and sit to supine , scoot up and down and roll side to side with MODIFIED INDEPENDENCE utilizing log roll technique within 7 treatment day(s). (2.)Kelly Mcgraw will transfer from bed to chair and chair to bed with MODIFIED INDEPENDENCE using the least restrictive device within 7 treatment day(s). (3.)Kelly Mcgraw will ambulate with MODIFIED INDEPENDENCE for 300+ feet with the least restrictive device within 7 treatment day(s). (4.)Kelly Mcgraw will perform standing static and dynamic balance activities x 23 minutes with MODIFIED INDEPENDENCE to improve safety within 7 treatment day(s). (5.)Kelly cMgraw will ascend and descend 1 stair(s) using no hand rail(s) with MODIFIED INDEPENDENCE to improve functional mobility and safety within 7 treatment day(s). (6.)Kelly Mcgraw will perform bilateral lower extremity exercises x 15 min for HEP with MODIFIED INDEPENDENCE to improve strength, endurance, and functional mobility within 7 treatment day(s).       PHYSICAL THERAPY: Daily Note and AM 8/16/2019  INPATIENT: PT Visit Days : 2  Payor: Will Berry / Plan: [address] [place] [address] HMO / Product Type: [employer] /       NAME/AGE/GENDER: Steve Jean is a 75 y.o. female   PRIMARY DIAGNOSIS: Cervical spinal stenosis [M48.02]  Myelomalacia ([facility] [address] [G95.89]  Myelopathy ([facility] [address] [G95.9]  Cervical spondylosis with myelopathy [M47.12] Cervical spinal stenosis   Cervical spinal stenosis    Procedure(s) (LRB):  C4-C6 LAMINECTOMY AND FUSION WITH ALLOGRAFT AND INSTRUMENTATION/ SSEP (N/A)  2 Days Post-Op  ICD-10: Treatment Diagnosis:   · Generalized Muscle Weakness (M62.81)  · Difficulty in walking, Not elsewhere classified (R26.2)  · Other abnormalities of gait and mobility (R26.89)  · Cervicalgia (M54.2)   Precaution/Allergies:  Keflex [cephalexin]; Oxycodone; and Sulfamethoxazole-trimethoprim      ASSESSMENT:     Ms. Danielle Edwards is a 75year old F who presents s/p C4-C6 laminectomy and fusion. Prior to hospital admission pt lives with her  in a twoy story home (lives on first floor) with one step(s) to enter. Pt endorses no falls in past 6 months. Prior to admission Ms. Esquivel uses a rollator walker or a straight cane for mobility, does report she furniture and wall walks at home. This morning, pt presents supine in bed with sister in room and very agreeable to therapy. Pt performed supine to sit with min assist.  Pt stood and ambulated about 250' with rolling walker and CGA. Pt returned to room and performed below LE exercises. Reviewed spinal precautions with pt. Pt was left up in chair with needs in reach and sister attending. Pt is making good progress with ambulation distance and overall mobility. Will continue with POC. This section established at most recent assessment   PROBLEM LIST (Impairments causing functional limitations):  1. Decreased Strength  2. Decreased Transfer Abilities  3. Decreased Ambulation Ability/Technique  4. Decreased Balance  5. Increased Pain  6. Decreased Activity Tolerance   INTERVENTIONS PLANNED: (Benefits and precautions of physical therapy have been discussed with the patient.)  1. Balance Exercise  2. Bed Mobility  3. Family Education  4. Gait Training  5. Home Exercise Program (HEP)  6. Neuromuscular Re-education/Strengthening  7. Range of Motion (ROM)  8. Therapeutic Activites  9. Therapeutic Exercise/Strengthening  10. Transfer Training     TREATMENT PLAN: Frequency/Duration: twice daily for duration of hospital stay  Rehabilitation Potential For Stated Goals: Good     REHAB RECOMMENDATIONS (at time of discharge pending progress):    Placement:   It is my opinion, based on this patient's performance to date, that Ms. Danisha Grant may benefit from being discharged with NO further skilled therapy due to the high likelihood of returning to baseline vs [address] pending progress and mobility with therapy. Equipment:    None at this time              HISTORY:   History of Present Injury/Illness (Reason for Referral): Pt presents /sp C4-C6 laminectomy and fusion after presenting with myelopathy, gait disturbance, and limb weakness/numbness prior. Past Medical History/Comorbidities:   Ms. Danisha Grant  has a past medical history of Diabetes ([address] GERD (gastroesophageal reflux disease), Hypercholesterolemia, Hypertension, Idiopathic chronic gout of left knee without tophus (5/23/2018), Mixed hyperlipidemia (5/11/2015), Seizures ([facility] [address] and Type 2 diabetes mellitus without complication, without long-term current use of insulin ([facility] [address] (5/11/2015). Ms. Danisha Grant  has a past surgical history that includes hx tubal ligation; hx other surgical; and hx knee replacement (Right).   Social History/Living Environment:   Home Environment: Private residence  # Steps to Enter: 1  One/Two Story Residence: Two story, live on 1st floor  Living Alone: No  Support Systems: Spouse/Significant Other/Partner  Patient Expects to be Discharged to[de-identified] Private residence  Current DME Used/Available at Home: [employer] , straight, Walker, rollator, Commode, bedside, Walker, rolling  Tub or Shower Type: Tub/Shower combination  Prior Level of Function/Work/Activity:  Mod I with use of rollator and/or straight cane, lives with  with bedroom and bathroom on first floor of home     Number of Personal Factors/Comorbidities that affect the Plan of Care: 1-2: MODERATE COMPLEXITY   EXAMINATION:   Most Recent Physical Functioning:   Gross Assessment:                  Posture:     Balance:  Sitting - Static: Good (unsupported)  Sitting - Dynamic: Good (unsupported)  Standing - Static: Good  Standing - Dynamic : Fair Bed Mobility:  Supine to Sit: Minimum assistance  Scooting: Contact guard assistance  Interventions: Safety awareness training;Verbal cues  Wheelchair Mobility:     Transfers:  Sit to Stand: Contact guard assistance  Stand to Sit: Contact guard assistance  Interventions: Safety awareness training;Verbal cues  Gait:     Base of Support: Center of gravity altered  Speed/[place]: Slow  Step Length: Left shortened;Right shortened  Gait Abnormalities: Decreased step clearance;Trunk sway increased  Distance (ft): 250 Feet (ft)  Assistive Device: Walker, rolling  Ambulation - Level of Assistance: Contact guard assistance      Body Structures Involved:  1. Nerves  2. Bones  3. Joints  4. Muscles Body Functions Affected:  1. Sensory/Pain  2. Neuromusculoskeletal  3. Movement Related Activities and Participation Affected:  1. General Tasks and Demands  2. Mobility   Number of elements that affect the Plan of Care: 3: MODERATE COMPLEXITY   CLINICAL PRESENTATION:   Presentation: Stable and uncomplicated: LOW COMPLEXITY   CLINICAL DECISION MAKIN[address] [place] [id number] -PAC 6 Clicks   Basic Mobility Inpatient Short Form  How much difficulty does the patient currently have. .. Unable A Lot A Little None   1. Turning over in bed (including adjusting bedclothes, sheets and blankets)? ? 1   ? 2   ? 3   ? 4   2. Sitting down on and standing up from a chair with arms ( e.g., wheelchair, bedside commode, etc.)   ? 1   ? 2   ? 3   ? 4   3. Moving from lying on back to sitting on the side of the bed?   ? 1   ? 2   ? 3   ? 4   How much help from another person does the patient currently need. .. Total A Lot A Little None   4. Moving to and from a bed to a chair (including a wheelchair)? ? 1   ? 2   ? 3   ? 4   5. Need to walk in hospital room? ? 1   ? 2   ? 3   ? 4   6. Climbing 3-5 steps with a railing? ? 1   ? 2   ? 3   ? 4   © 2007, Trustees of [address] [place] [id number], under license to [employer].  All rights reserved      Score:  Initial: 18 Most Recent: X (Date: -- )    Interpretation of Tool:  Represents activities that are increasingly more difficult (i.e. Bed mobility, Transfers, Gait). Medical Necessity:     · Patient is expected to demonstrate progress in strength, range of motion, balance, coordination and functional technique  ·  to increase independence with all mobility and promote return to prior level of function. · .  Reason for Services/Other Comments:  · Patient continues to require skilled intervention due to medical complications and mobility deficits which impact her level of function, safety, and independence as indicated above. · .   Use of outcome tool(s) and clinical judgement create a POC that gives a: Clear prediction of patient's progress: LOW COMPLEXITY        TREATMENT:      Pre-treatment Symptoms/Complaints:  Ready to get up and moving. Pain: Initial:      Post Session:  Unchanged. RN notified. Therapeutic Activity: (    15 minutes): Therapeutic activities including Bed transfers, Chair transfers, Ambulation on level ground and strengthening BLE exercises and safety awareness training including instruction on precautions and mobility  to improve mobility, strength, balance and coordination. Required minimal   cues/instruction with CGA to promote static and dynamic balance in standing and promote coordination of bilateral, lower extremity(s). Therapeutic Exercise: (  23 minutes):  Exercises per grid below to improve mobility and strength. Required minimal visual and verbal cues to promote proper body posture and promote proper body mechanics. Progressed range and repetitions as indicated.      Date:  8/15/19 Date:  8/16/19 Date:     ACTIVITY/EXERCISE AM PM AM PM AM PM   Seated AP 1x10 AB 1x15 AB X 15 B      Seated LAQ 1x10 AB 1x15 AB X 15 B      Seated Marches 1x10 AB 1x10 AB X 10 B      Sit to Stands X4 reps 1x6 reps       Seated hip abd   X 10 B                        B = bilateral; AA = active assistive; A = active; P = passive    Braces/Orthotics/Lines/Etc:   · O2 Device: Room Air  Treatment/Session Assessment:    · Response to Treatment:  See above; progressing well towards stated goals  · Interdisciplinary Collaboration:   o Physical Therapy Assistant  o Registered Nurse  · After treatment position/precautions:   o Up in chair  o Bed alarm/tab alert on  o Bed/Chair-wheels locked  o Bed in low position  o Call light within reach  o RN notified  o Family at bedside   · Compliance with Program/Exercises: Will assess as treatment progresses  · Recommendations/Intent for next treatment session: \"Next visit will focus on advancements to more challenging activities and reduction in assistance provided\".     Total Treatment Duration:  PT Patient Time In/Time Out  Time In: 1019  Time Out: Pavan Salgado, PTA

## 2019-08-16 NOTE — PROGRESS NOTES
Problem: Falls - Risk of  Goal: *Absence of Falls  Description  Document Karin Raymond Fall Risk and appropriate interventions in the flowsheet.   Outcome: Progressing Towards Goal  Note:   Fall Risk Interventions:  Mobility Interventions: Bed/chair exit alarm, OT consult for ADLs, Patient to call before getting OOB, PT Consult for mobility concerns    Mentation Interventions: Bed/chair exit alarm, Door open when patient unattended    Medication Interventions: Bed/chair exit alarm, Evaluate medications/consider consulting pharmacy, Patient to call before getting OOB, Teach patient to arise slowly    Elimination Interventions: Call light in reach, Bed/chair exit alarm, Patient to call for help with toileting needs, Stay With Me (per policy)    History of Falls Interventions: Bed/chair exit alarm, Door open when patient unattended, Investigate reason for fall         Problem: Patient Education: Go to Patient Education Activity  Goal: Patient/Family Education  Outcome: Progressing Towards Goal     Problem: Simple Spine Procedure:  Post Op Day 1/Day of Discharge  Goal: Activity/Safety  Outcome: Progressing Towards Goal  Goal: Nutrition/Diet  Outcome: Progressing Towards Goal  Goal: Discharge Planning  Outcome: Progressing Towards Goal  Goal: Medications  Outcome: Progressing Towards Goal  Goal: Respiratory  Outcome: Progressing Towards Goal  Goal: Treatments/Interventions/Procedures  Outcome: Progressing Towards Goal  Goal: Psychosocial  Outcome: Progressing Towards Goal

## 2019-08-16 NOTE — DISCHARGE SUMMARY
Discharge Summary    Patient ID:Kelly Monique  [id number]  1955  75 y.o. 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 Cervical laminectomy with bilateral foraminotomies C4, C5, C6 (CPT L1929696, [id number]). 2. Posterior cervical arthrodesis C4-C5, C5-C6. (CPT 63787, 31442 X 1)  3. Lateral mass instrumentation C3-C7 (CPT E382973)  4. Allograft (CPT 96325)       Post Op complications: none    HBG at Discharge: No results for input(s): HGB, HGBEXT in the last 72 hours. Indications for admission/procedure: The patient was felt to have evidence of cervical myelopathy by history and physical exam. A cervical imaging study confirmed the presence of multilevel stenosis. Conservative efforts have been exhausted. In the outpatient setting the risks, benefits, and potential complications of the above listed procedure were discussed with him in detail and an informed consent was obtained. Hospital Course: Patient admitted to ortho floor. Antibiotics were given postop. SCD and danuta hose were in place for DVT prophylaxis. Bruce catheter was discontinued on POD # 1. Patient voided normally. Patient did not receive blood transfusion. Patient tolerated pain medications and po diet. Hemovac drain was removed on POD # 2.  Dressing remained clean, dry, and intact. Physical Therapy started on the day following surgery and progressed to independent ambulation. Patient remained neurologically stable throughout hospital course. Reports improvement of preoperative pain. At the time of discharge, had understanding of precautions needed following surgery. Discharged to: home    Condition: Stable    New Medications: Dilaudid    Follow up: 2 weeks    Discharge instructions:    -Resume pre hospital diet            -Resume home medications per medical continuation form     -Follow up in office as scheduled   -Call doctor immediately if T>100.5, increased pain, swelling, drainage.   -If shortness of breath or chest pain, immediately go to ER  -Post surgical instruction sheet given to patient    Signed:  Simmie Snellen, NP  8/16/2019

## 2019-08-16 NOTE — DISCHARGE INSTRUCTIONS
Soft diet    MAY shower    LEAVE dressing on incision for 3 DAYS-->then may remove   (If dressing starts to fall off may re-secure with tape)    NO lifting anything heavier than 5LBS     MAY turn head to the right and left and look down--> MINIMIZE looking upwards    Avoid reaching above head    NO driving until directed by your doctor    DO NOT take any NSAIDS (either prescribed or over the counter until directed    (Aleve, Ibuprofen, Mobic, etc) as this will interfere with bone healing    Avoid having pets sleep in bed with you until incision is completely healed    CALL the doctor if:  Fever >100.5  ([phone])                 Incision becomes red ,swollen or opens up               Incision has yellow, thick drainage or an odor               Pain is not managed with prescribed medications               Excessive nausea and/or vomiting                                     Increased difficulty with swallowing        Resume pre hospital diet            -Resume home medications per medical continuation form     -Follow up in office as scheduled   -Call doctor immediately if T>100.5, increased pain, swelling, drainage. -If shortness of breath or chest pain, immediately go to ER  -Post surgical instruction sheet given to patient    Discharge Instructions - Spine Surgery    Wound Care and Showering  Your wound will typically be covered with a clear plastic dressing when you go home from the hospital.  Since it is transparent, you will see the underlying gauze turn red with blood which is normal.  You do not need to change the dressing unless it is leaking from the edges. Otherwise, leave this dressing in place. The clear plastic dressing is waterproof, so you can take a shower while it is on. You may remove the clear plastic dressing and the underlying gauze 3 days after surgery. There will be small tape strips under the gauze which should be left in place.   If there is no leaking from the wound, you may take a shower and allow the tape strips to get wet. Some of them may fall off. The remaining strips will be removed once you return to the office. If there is persistent leaking when you first remove the clear dressing, apply new gauze and a new clear plastic dressing (typically purchased at a pharmacy) over the wound. Hair washing is permissible while in the shower. No tub baths, hot tubs or whirlpools until seen in the office. If any of the following should occur, please call the office:   Persistent drainage from incision site   Opening of incisions   Fevers greater than 101 degrees   Flu-like symptoms   Increased redness     Exercise  You have unlimited walking and stair climbing privileges. Walking outside or walking on a treadmill without an incline is also allowed. Do NOT lift anything weighing greater than 10-15 lbs. Especially try to avoid lifting or reaching above your head. Sleeping  You may sleep in any comfortable position. Many patients find comfort sleeping in a recliner chair. It is normal to have difficulty sleeping for the first several weeks following your surgery. We recommend trying Benadryl, Melatonin, or Tylenol PM for help sleeping. All are over-the-counter and can be found in drugstores. Eating  Because of the tubes in your throat while asleep during surgery, it is normal to have a sore throat and some difficulty swallowing solid foods after your surgery. This may persist for several weeks. Eating soft foods like yogurt, macaroni and mashed potatoes seem to help. Pain   If you feel you need pain medicine, you may take regular or extra-strength Tylenol. Do NOT take an anti-inflammatory medication such as Advil, Aleve, or Motrin for the first 8 weeks following your surgery. Anti-inflammatory medications like these hinder bone growth and healing, which is critical in the weeks following surgery.  Do not resume taking Fosamax for eight weeks after your fusion surgery.    To help alleviate persistent soreness around the shoulder blades, apply ice or warm moist compresses. Driving  You may NOT drive a car until told otherwise by your physician. You may be a passenger for short distances (about 20-30 minutes.) If you must take a longer trip, be sure to make several pit stops so that you can walk and stretch your legs. Reclining in the passenger seat seems to be the most comfortable position for most patients. In some states, it is illegal to drive a car while wearing a neck brace. Follow-Up Appointments  When you are discharged from the hospital, a follow up appointment will be made for 2-3 weeks from your surgery date. Call [phone] to confirm your appointment. DISCHARGE SUMMARY from Nurse    PATIENT INSTRUCTIONS:    After general anesthesia or intravenous sedation, for 24 hours or while taking prescription Narcotics:  · Limit your activities  · Do not drive and operate hazardous machinery  · Do not make important personal or business decisions  · Do  not drink alcoholic beverages  · If you have not urinated within 8 hours after discharge, please contact your surgeon on call. Report the following to your surgeon:  · Excessive pain, swelling, redness or odor of or around the surgical area  · Temperature over 100.5  · Nausea and vomiting lasting longer than 4 hours or if unable to take medications  · Any signs of decreased circulation or nerve impairment to extremity: change in color, persistent  numbness, tingling, coldness or increase pain  · Any questions    What to do at Home:  Recommended activity: per MD/PT/OT    If you experience any of the following symptoms fever > 100.5, nausea, vomiting, pain call MD, chest pain and/or shortness of breath to ER please follow up with MD.    *  Please give a list of your current medications to your Primary Care Provider.     *  Please update this list whenever your medications are discontinued, doses are      changed, or new medications (including over-the-counter products) are added. *  Please carry medication information at all times in case of emergency situations. These are general instructions for a healthy lifestyle:    No smoking/ No tobacco products/ Avoid exposure to second hand smoke  Surgeon General's Warning:  Quitting smoking now greatly reduces serious risk to your health. Obesity, smoking, and sedentary lifestyle greatly increases your risk for illness    A healthy diet, regular physical exercise & weight monitoring are important for maintaining a healthy lifestyle    You may be retaining fluid if you have a history of heart failure or if you experience any of the following symptoms:  Weight gain of 3 pounds or more overnight or 5 pounds in a week, increased swelling in our hands or feet or shortness of breath while lying flat in bed. Please call your doctor as soon as you notice any of these symptoms; do not wait until your next office visit. The discharge information has been reviewed with the patient. The patient verbalized understanding. Discharge medications reviewed with the patient and appropriate educational materials and side effects teaching were provided.   ___________________________________________________________________________________________________________________________________

## 2019-08-16 NOTE — PROGRESS NOTES
ORTHO PROGRESS NOTE    2019    Admit Date: 2019  Admit Diagnosis: Cervical spinal stenosis [M48.02]  Myelomalacia ([facility] [address] [G95.89]  Myelopathy ([facility] [address] [G95.9]  Cervical spondylosis with myelopathy [M47.12]  Post Op day: 2 Days Post-Op      Subjective:     Laina Thomas is a patient who is now 2 Days Post-Op  and has complaints  of neck pain. .       Objective:     PT/OT: slowly progressing        Vital Signs:    Patient Vitals for the past 8 hrs:   BP Temp Pulse Resp SpO2   19 0720 154/80 98.5 °F (36.9 °C) 91 18 93 %   19 0346 108/67 98.7 °F (37.1 °C) (!) 101 18 91 %     Temp (24hrs), Av.6 °F (37 °C), Min:98.4 °F (36.9 °C), Max:99 °F (37.2 °C)      LAB:    No results for input(s): HGB, WBC, PLT, HGBEXT, PLTEXT in the last 72 hours. I/O:  No intake/output data recorded.  1901 -  0700  In: 12 [P.O.:960]  Out: 650 [Urine:600; Drains:50]    Physical Exam:    Awake and in no acute distress. Mood and affect appropriate. Respirations unlabored and no evidence cyanosis. Calves nontender. Abdomen soft and nontender. Dressing clean/dry  No new neurologic deficit.     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

## 2019-10-31 ENCOUNTER — HOSPITAL ENCOUNTER (OUTPATIENT)
Dept: ULTRASOUND IMAGING | Age: 64
Discharge: HOME | End: 2019-10-31
Attending: FAMILY MEDICINE
Payer: MEDICARE

## 2019-10-31 DIAGNOSIS — E04.1 THYROID NODULE: ICD-10-CM

## 2019-10-31 PROCEDURE — 76536 US EXAM OF HEAD AND NECK: CPT

## 2019-11-04 NOTE — PROGRESS NOTES
Patient notified of results and instructions, per Dr Avilez Fljacqui note, and voiced understanding.

## 2020-07-02 PROBLEM — G95.89 MYELOMALACIA (HCC): Status: RESOLVED | Noted: 2019-08-14 | Resolved: 2020-07-02

## 2022-03-19 PROBLEM — M47.12 CERVICAL SPONDYLOSIS WITH MYELOPATHY: Status: ACTIVE | Noted: 2019-08-14

## 2022-03-19 PROBLEM — M1A.0620 IDIOPATHIC CHRONIC GOUT OF LEFT KNEE WITHOUT TOPHUS: Status: ACTIVE | Noted: 2018-05-23

## 2022-03-20 PROBLEM — M48.02 CERVICAL SPINAL STENOSIS: Status: ACTIVE | Noted: 2019-08-14

## 2022-06-08 RX ORDER — LISINOPRIL AND HYDROCHLOROTHIAZIDE 20; 12.5 MG/1; MG/1
TABLET ORAL
Qty: 30 TABLET | Refills: 5 | Status: SHIPPED | OUTPATIENT
Start: 2022-06-08

## 2022-12-01 ENCOUNTER — NURSE TRIAGE (OUTPATIENT)
Dept: OTHER | Facility: CLINIC | Age: 67
End: 2022-12-01

## 2022-12-01 NOTE — TELEPHONE ENCOUNTER
Location of patient: [place]    Received call from [place] at [facility]. Subjective: Caller states \"I've had two Bms and there was bright red blood in the stool both times. It looks like there are little clots in the stool\"     Current Symptoms: pt reports being diabetic and her most recent blood glucose level is 115    Onset:   11/30/22 in the afternoon    Pain Severity:   abdomen is \"mildly sore\"     Temperature:  no fever    What has been tried: sitz bath for comfort    Recommended disposition: See PCP within 24 Hours    Care advice provided, patient verbalizes understanding; denies any other questions or concerns; instructed to call back for any new or worsening symptoms. Caitlin , Service Expert, is working to get an appt for pt. Attention Provider: Thank you for allowing me to participate in the care of your patient. The patient was connected to triage in response to information provided to the ECC/PSC. Please do not respond through this encounter as the response is not directed to a shared pool.     Reason for Disposition   MODERATE rectal bleeding (small blood clots, passing blood without stool, or toilet water turns red)    Protocols used: Rectal Bleeding-ADULT-AH

## 2022-12-02 ENCOUNTER — HOSPITAL ENCOUNTER (EMERGENCY)
Age: 67
Discharge: HOME | End: 2022-12-02
Attending: EMERGENCY MEDICINE
Payer: MEDICARE

## 2022-12-02 VITALS
BODY MASS INDEX: 28.68 KG/M2 | HEIGHT: 64 IN | DIASTOLIC BLOOD PRESSURE: 55 MMHG | WEIGHT: 168 LBS | HEART RATE: 78 BPM | SYSTOLIC BLOOD PRESSURE: 112 MMHG | TEMPERATURE: 98.6 F | RESPIRATION RATE: 16 BRPM | OXYGEN SATURATION: 98 %

## 2022-12-02 DIAGNOSIS — K62.5 RECTAL BLEEDING: Primary | ICD-10-CM

## 2022-12-02 LAB
ANION GAP SERPL CALC-SCNC: 3 MMOL/L (ref 2–11)
BASOPHILS # BLD: 0.1 K/UL (ref 0–0.2)
BASOPHILS NFR BLD: 1 % (ref 0–2)
BUN SERPL-MCNC: 17 MG/DL (ref 8–23)
CALCIUM SERPL-MCNC: 9.5 MG/DL (ref 8.3–10.4)
CHLORIDE SERPL-SCNC: 108 MMOL/L (ref 101–110)
CO2 SERPL-SCNC: 29 MMOL/L (ref 21–32)
CREAT SERPL-MCNC: 1.2 MG/DL (ref 0.6–1)
DIFFERENTIAL METHOD BLD: ABNORMAL
EOSINOPHIL # BLD: 0.3 K/UL (ref 0–0.8)
EOSINOPHIL NFR BLD: 4 % (ref 0.5–7.8)
ERYTHROCYTE [DISTWIDTH] IN BLOOD BY AUTOMATED COUNT: 12.9 % (ref 11.9–14.6)
GLUCOSE SERPL-MCNC: 131 MG/DL (ref 65–100)
HCT VFR BLD AUTO: 41 % (ref 35.8–46.3)
HGB BLD-MCNC: 12.8 G/DL (ref 11.7–15.4)
IMM GRANULOCYTES # BLD AUTO: 0 K/UL (ref 0–0.5)
IMM GRANULOCYTES NFR BLD AUTO: 0 % (ref 0–5)
LYMPHOCYTES # BLD: 2.2 K/UL (ref 0.5–4.6)
LYMPHOCYTES NFR BLD: 32 % (ref 13–44)
MCH RBC QN AUTO: 27.9 PG (ref 26.1–32.9)
MCHC RBC AUTO-ENTMCNC: 31.2 G/DL (ref 31.4–35)
MCV RBC AUTO: 89.5 FL (ref 82–102)
MONOCYTES # BLD: 0.5 K/UL (ref 0.1–1.3)
MONOCYTES NFR BLD: 8 % (ref 4–12)
NEUTS SEG # BLD: 3.7 K/UL (ref 1.7–8.2)
NEUTS SEG NFR BLD: 55 % (ref 43–78)
NRBC # BLD: 0 K/UL (ref 0–0.2)
PLATELET # BLD AUTO: 183 K/UL (ref 150–450)
PMV BLD AUTO: 11.2 FL (ref 9.4–12.3)
POTASSIUM SERPL-SCNC: 3.9 MMOL/L (ref 3.5–5.1)
RBC # BLD AUTO: 4.58 M/UL (ref 4.05–5.2)
SODIUM SERPL-SCNC: 140 MMOL/L (ref 133–143)
WBC # BLD AUTO: 6.9 K/UL (ref 4.3–11.1)

## 2022-12-02 PROCEDURE — 80048 BASIC METABOLIC PNL TOTAL CA: CPT

## 2022-12-02 PROCEDURE — 85025 COMPLETE CBC W/AUTO DIFF WBC: CPT

## 2022-12-02 PROCEDURE — 99283 EMERGENCY DEPT VISIT LOW MDM: CPT

## 2022-12-02 RX ORDER — HYDROCORTISONE ACETATE 25 MG/1
25 SUPPOSITORY RECTAL 2 TIMES DAILY
Qty: 12 SUPPOSITORY | Refills: 1 | Status: SHIPPED | OUTPATIENT
Start: 2022-12-02

## 2022-12-02 ASSESSMENT — ENCOUNTER SYMPTOMS
RESPIRATORY NEGATIVE: 1
HEMATOCHEZIA: 1
HEMATEMESIS: 0
ABDOMINAL PAIN: 0
VOMITING: 0

## 2022-12-02 NOTE — ED NOTES
I have reviewed discharge instructions with the patient and caregiver. The patient and caregiver verbalized understanding. Patient left ED via Discharge Method: ambulatory to Home with family. Opportunity for questions and clarification provided. Patient given 1 scripts. To continue your aftercare when you leave the hospital, you may receive an automated call from our care team to check in on how you are doing. This is a free service and part of our promise to provide the best care and service to meet your aftercare needs.  If you have questions, or wish to unsubscribe from this service please call [phone]. Thank you for Choosing our [facility] Emergency Department.         [address] [place] [address] Road, RN  12/02/22 5398

## 2022-12-02 NOTE — DISCHARGE INSTRUCTIONS
Present lab work including electrolytes glucose and hemoglobin are all stable  No present identified bleeding source, rectal exam with no present blood  Medication: Anusol-HC  Should bleeding worsen or persist further work-up that might include visual exam such as colonoscopy might be required

## 2022-12-02 NOTE — ED TRIAGE NOTES
Pt states she had BM yesterday and had bright red blood in stool yesterday. Denies pain today and bleeding is not present. States she has had a rectal fistula in the past and had to have surgery to correct the issue.

## 2022-12-02 NOTE — ED PROVIDER NOTES
Emergency Department Provider Note                   PCP:                Marcia Libman, DO               Age: 69 y.o. Sex: female     No diagnosis found. DISPOSITION          MDM  Number of Diagnoses or Management Options  Rectal bleeding  Diagnosis management comments: Rectal bleeding could be a fissure or internal hemorrhoid. Patient not on any blood thinners. Less likely malignant due to fact that is sudden onset of bright red though patient aware with persistence will need further work-up       Amount and/or Complexity of Data Reviewed  Clinical lab tests: ordered and reviewed    Risk of Complications, Morbidity, and/or Mortality  Presenting problems: moderate  Diagnostic procedures: moderate  Management options: moderate    Patient Progress  Patient progress: stable             Orders Placed This Encounter   Procedures    CBC with Auto Differential    BMP        Medications - No data to display    New Prescriptions    No medications on file        Danyel Cano is a 69 y.o. female who presents to the Emergency Department with chief complaint of    Chief Complaint   Patient presents with    Rectal Bleeding      Here with some bright red blood at bowel movements. She does not have significant rectal pain. In approximately 2008 she had an anal fissure that was surgically corrected in 2 procedures by Dr. Payton Colón. Any blood thinners. Does not have any other area of abnormal bleeding. No fevers or chills. Does have a legacy of alternating loose and constipated type stool. Before onset of rectal bleeding several days ago she had a very hard stool that was difficult to pass does not have overt pain to bowel movement at present    The history is provided by the patient.    Rectal Bleeding  Quality:  Bright red  Amount:  Unable to specify  Context: not hemorrhoids and not rectal injury    Similar prior episodes: no    Relieved by:  Nothing  Ineffective treatments:  None tried  Associated symptoms: no abdominal pain, no fever, no hematemesis, no recent illness and no vomiting    Risk factors: hx of colorectal surgery    Risk factors: no hx of colorectal cancer    Risk factors comment:  Rectal fistula repair      Review of Systems   Constitutional:  Negative for fever. Respiratory: Negative. Cardiovascular: Negative. Gastrointestinal:  Positive for hematochezia. Negative for abdominal pain, hematemesis and vomiting. Genitourinary: Negative. Neurological: Negative. Psychiatric/Behavioral:  Negative for confusion and decreased concentration. All other systems reviewed and are negative. Past Medical History:   Diagnosis Date    Diabetes ([facility] [address]     Last A1C 6.0 in 10/2018, , No low's, Takes PO    GERD (gastroesophageal reflux disease)     Hypercholesterolemia     Hypertension     Idiopathic chronic gout of left knee without tophus 5/23/2018    Mixed hyperlipidemia 5/11/2015    Seizures ([facility] [address]     was told as a kid, passing out. None as an adult    Type 2 diabetes mellitus without complication, without long-term current use of insulin ([facility] [address] 5/11/2015        Past Surgical History:   Procedure Laterality Date    OTHER SURGICAL HISTORY      abcess in rectum, Dr. Micaela Negrete Right     knee    TUBAL LIGATION      tubal        Family History   Problem Relation Age of Onset    Diabetes Sister     Cancer Sister         breast    Diabetes Mother     Cancer Mother         colon    Cancer Maternal Aunt         neck        Social History     Socioeconomic History    Marital status:    Tobacco Use    Smoking status: Never    Smokeless tobacco: Never   Substance and Sexual Activity    Alcohol use:  Yes     Alcohol/week: 1.0 standard drink    Drug use: Never         Cephalexin, Oxycodone, and Sulfamethoxazole-trimethoprim     Previous Medications    LISINOPRIL-HYDROCHLOROTHIAZIDE (PRINZIDE;ZESTORETIC) 20-12.5 MG PER TABLET    Take 1 tablet by mouth once daily METFORMIN (GLUCOPHAGE) 1000 MG TABLET    TAKE 1 TABLET BY MOUTH TWICE DAILY WITH MORNING MEAL AND WITH EVENING MEAL    PRAVASTATIN (PRAVACHOL) 40 MG TABLET    Take 40 mg by mouth daily        Vitals signs and nursing note reviewed. Patient Vitals for the past 4 hrs:   Temp Pulse Resp BP SpO2   12/02/22 1037 98.6 °F (37 °C) 84 16 (!) 143/74 98 %          Physical Exam  Vitals and nursing note reviewed. HENT:      Head: Atraumatic. Right Ear: External ear normal.      Left Ear: External ear normal.      Nose: Nose normal.   Eyes:      General: No scleral icterus. Cardiovascular:      Rate and Rhythm: Normal rate and regular rhythm. Pulmonary:      Effort: Pulmonary effort is normal.      Breath sounds: Normal breath sounds. Abdominal:      General: Abdomen is flat. There is no distension. Palpations: Abdomen is soft. Tenderness: There is no abdominal tenderness. There is no guarding or rebound. Genitourinary:     Rectum: Guaiac result negative. Comments: Fairly tight rectal canal.  No external bleeding and no blood found at the time of present exam  Neurological:      Mental Status: She is alert. Psychiatric:         Behavior: Behavior normal.        Procedures    No results found for any visits on 12/02/22. No orders to display                       Voice dictation software was used during the making of this note. This software is not perfect and grammatical and other typographical errors may be present. This note has not been completely proofread for errors.         Dominga Arriola MD  12/02/22 0715

## 2023-05-26 RX ORDER — LISINOPRIL AND HYDROCHLOROTHIAZIDE 20; 12.5 MG/1; MG/1
TABLET ORAL
Qty: 90 TABLET | Refills: 0 | OUTPATIENT
Start: 2023-05-26

## 2023-05-30 NOTE — TELEPHONE ENCOUNTER
Needs refill on     lisinopril-hydroCHLOROthiazide (PRINZIDE;ZESTORETIC) 20-12.5 MG per tablet    Send to  [facility] [address]  [phone]

## 2023-05-31 RX ORDER — LISINOPRIL AND HYDROCHLOROTHIAZIDE 20; 12.5 MG/1; MG/1
1 TABLET ORAL DAILY
Qty: 30 TABLET | Refills: 2 | Status: SHIPPED | OUTPATIENT
Start: 2023-05-31

## 2023-07-03 ENCOUNTER — TELEPHONE (OUTPATIENT)
Dept: INTERNAL MEDICINE CLINIC | Facility: CLINIC | Age: 68
End: 2023-07-03

## 2023-07-03 NOTE — TELEPHONE ENCOUNTER
Called patient to remind her of her missed appointment today. Patients phone just rang with no voicemail.

## 2023-07-17 ENCOUNTER — OFFICE VISIT (OUTPATIENT)
Dept: INTERNAL MEDICINE CLINIC | Facility: CLINIC | Age: 68
End: 2023-07-17
Payer: MEDICARE

## 2023-07-17 VITALS
OXYGEN SATURATION: 98 % | BODY MASS INDEX: 28 KG/M2 | HEART RATE: 78 BPM | RESPIRATION RATE: 16 BRPM | WEIGHT: 164 LBS | SYSTOLIC BLOOD PRESSURE: 132 MMHG | DIASTOLIC BLOOD PRESSURE: 70 MMHG | HEIGHT: 64 IN

## 2023-07-17 DIAGNOSIS — E11.22 TYPE 2 DIABETES MELLITUS WITH STAGE 3A CHRONIC KIDNEY DISEASE, WITHOUT LONG-TERM CURRENT USE OF INSULIN (HCC): Primary | ICD-10-CM

## 2023-07-17 DIAGNOSIS — I10 ESSENTIAL HYPERTENSION: ICD-10-CM

## 2023-07-17 DIAGNOSIS — Z96.651 STATUS POST TOTAL RIGHT KNEE REPLACEMENT: ICD-10-CM

## 2023-07-17 DIAGNOSIS — M48.061 LUMBAR FORAMINAL STENOSIS: ICD-10-CM

## 2023-07-17 DIAGNOSIS — Z00.00 MEDICARE ANNUAL WELLNESS VISIT, SUBSEQUENT: ICD-10-CM

## 2023-07-17 DIAGNOSIS — D64.9 ANEMIA, UNSPECIFIED TYPE: ICD-10-CM

## 2023-07-17 DIAGNOSIS — Z98.890 HX OF CERVICAL SPINE SURGERY: ICD-10-CM

## 2023-07-17 DIAGNOSIS — E78.2 MIXED HYPERLIPIDEMIA: ICD-10-CM

## 2023-07-17 DIAGNOSIS — N18.31 TYPE 2 DIABETES MELLITUS WITH STAGE 3A CHRONIC KIDNEY DISEASE, WITHOUT LONG-TERM CURRENT USE OF INSULIN (HCC): Primary | ICD-10-CM

## 2023-07-17 DIAGNOSIS — M51.36 DDD (DEGENERATIVE DISC DISEASE), LUMBAR: ICD-10-CM

## 2023-07-17 DIAGNOSIS — Z78.0 POSTMENOPAUSAL: ICD-10-CM

## 2023-07-17 PROBLEM — M51.369 DDD (DEGENERATIVE DISC DISEASE), LUMBAR: Status: ACTIVE | Noted: 2023-07-17

## 2023-07-17 LAB
ALBUMIN SERPL-MCNC: 3.7 G/DL (ref 3.2–4.6)
ALBUMIN/GLOB SERPL: 1.1 (ref 0.4–1.6)
ALP SERPL-CCNC: 57 U/L (ref 50–136)
ALT SERPL-CCNC: 19 U/L (ref 12–65)
ANION GAP SERPL CALC-SCNC: 4 MMOL/L (ref 2–11)
AST SERPL-CCNC: 16 U/L (ref 15–37)
BILIRUB SERPL-MCNC: 0.3 MG/DL (ref 0.2–1.1)
BUN SERPL-MCNC: 12 MG/DL (ref 8–23)
CALCIUM SERPL-MCNC: 9.4 MG/DL (ref 8.3–10.4)
CHLORIDE SERPL-SCNC: 110 MMOL/L (ref 101–110)
CHOLEST SERPL-MCNC: 104 MG/DL
CO2 SERPL-SCNC: 29 MMOL/L (ref 21–32)
CREAT SERPL-MCNC: 1.1 MG/DL (ref 0.6–1)
ERYTHROCYTE [DISTWIDTH] IN BLOOD BY AUTOMATED COUNT: 12.7 % (ref 11.9–14.6)
GLOBULIN SER CALC-MCNC: 3.4 G/DL (ref 2.8–4.5)
GLUCOSE SERPL-MCNC: 121 MG/DL (ref 65–100)
HCT VFR BLD AUTO: 37.2 % (ref 35.8–46.3)
HDLC SERPL-MCNC: 64 MG/DL (ref 40–60)
HDLC SERPL: 1.6
HGB BLD-MCNC: 11.3 G/DL (ref 11.7–15.4)
LDLC SERPL CALC-MCNC: 15 MG/DL
MCH RBC QN AUTO: 28 PG (ref 26.1–32.9)
MCHC RBC AUTO-ENTMCNC: 30.4 G/DL (ref 31.4–35)
MCV RBC AUTO: 92.1 FL (ref 82–102)
NRBC # BLD: 0 K/UL (ref 0–0.2)
PLATELET # BLD AUTO: 174 K/UL (ref 150–450)
PMV BLD AUTO: 11.3 FL (ref 9.4–12.3)
POTASSIUM SERPL-SCNC: 4.2 MMOL/L (ref 3.5–5.1)
PROT SERPL-MCNC: 7.1 G/DL (ref 6.3–8.2)
RBC # BLD AUTO: 4.04 M/UL (ref 4.05–5.2)
SODIUM SERPL-SCNC: 143 MMOL/L (ref 133–143)
TRIGL SERPL-MCNC: 125 MG/DL (ref 35–150)
VLDLC SERPL CALC-MCNC: 25 MG/DL (ref 6–23)
WBC # BLD AUTO: 6.1 K/UL (ref 4.3–11.1)

## 2023-07-17 PROCEDURE — 3017F COLORECTAL CA SCREEN DOC REV: CPT | Performed by: FAMILY MEDICINE

## 2023-07-17 PROCEDURE — 3075F SYST BP GE 130 - 139MM HG: CPT | Performed by: FAMILY MEDICINE

## 2023-07-17 PROCEDURE — 3078F DIAST BP <80 MM HG: CPT | Performed by: FAMILY MEDICINE

## 2023-07-17 PROCEDURE — 3046F HEMOGLOBIN A1C LEVEL >9.0%: CPT | Performed by: FAMILY MEDICINE

## 2023-07-17 PROCEDURE — G0439 PPPS, SUBSEQ VISIT: HCPCS | Performed by: FAMILY MEDICINE

## 2023-07-17 PROCEDURE — 1123F ACP DISCUSS/DSCN MKR DOCD: CPT | Performed by: FAMILY MEDICINE

## 2023-07-17 RX ORDER — LISINOPRIL AND HYDROCHLOROTHIAZIDE 20; 12.5 MG/1; MG/1
1 TABLET ORAL DAILY
Qty: 90 TABLET | Refills: 1 | Status: SHIPPED | OUTPATIENT
Start: 2023-07-17

## 2023-07-17 SDOH — ECONOMIC STABILITY: FOOD INSECURITY: WITHIN THE PAST 12 MONTHS, THE FOOD YOU BOUGHT JUST DIDN'T LAST AND YOU DIDN'T HAVE MONEY TO GET MORE.: NEVER TRUE

## 2023-07-17 SDOH — ECONOMIC STABILITY: HOUSING INSECURITY
IN THE LAST 12 MONTHS, WAS THERE A TIME WHEN YOU DID NOT HAVE A STEADY PLACE TO SLEEP OR SLEPT IN A SHELTER (INCLUDING NOW)?: NO

## 2023-07-17 SDOH — ECONOMIC STABILITY: FOOD INSECURITY: WITHIN THE PAST 12 MONTHS, YOU WORRIED THAT YOUR FOOD WOULD RUN OUT BEFORE YOU GOT MONEY TO BUY MORE.: NEVER TRUE

## 2023-07-17 SDOH — ECONOMIC STABILITY: INCOME INSECURITY: HOW HARD IS IT FOR YOU TO PAY FOR THE VERY BASICS LIKE FOOD, HOUSING, MEDICAL CARE, AND HEATING?: NOT HARD AT ALL

## 2023-07-17 ASSESSMENT — PATIENT HEALTH QUESTIONNAIRE - PHQ9
1. LITTLE INTEREST OR PLEASURE IN DOING THINGS: 0
SUM OF ALL RESPONSES TO PHQ9 QUESTIONS 1 & 2: 0
SUM OF ALL RESPONSES TO PHQ QUESTIONS 1-9: 0
2. FEELING DOWN, DEPRESSED OR HOPELESS: 0
SUM OF ALL RESPONSES TO PHQ QUESTIONS 1-9: 0

## 2023-07-17 ASSESSMENT — LIFESTYLE VARIABLES
HOW MANY STANDARD DRINKS CONTAINING ALCOHOL DO YOU HAVE ON A TYPICAL DAY: PATIENT DOES NOT DRINK
HOW OFTEN DO YOU HAVE A DRINK CONTAINING ALCOHOL: NEVER

## 2023-07-17 NOTE — PROGRESS NOTES
Medicare Annual Wellness Visit    Yazan Meier is here for Medicare AWV    Assessment & Plan   Type 2 diabetes mellitus with stage 3a chronic kidney disease, without long-term current use of insulin ([address])  -     Comprehensive Metabolic Panel; Future  -     Hemoglobin A1C; Future  -     Microalbumin / Creatinine Urine Ratio; Future  -     CBC; Future  Essential hypertension  -     lisinopril-hydroCHLOROthiazide (PRINZIDE;ZESTORETIC) 20-12.5 MG per tablet; Take 1 tablet by mouth daily, Disp-90 tablet, R-1Normal  Mixed hyperlipidemia  -     Lipid Panel; Future  Lumbar foraminal stenosis  DDD (degenerative disc disease), lumbar  Medicare annual wellness visit, subsequent  Status post total right knee replacement  Hx of cervical spine surgery  Postmenopausal  -     DEXA BONE DENSITY AXIAL SKELETON; Future    Tolerating medication well and achieving desired therapeutic response. Will continue with:   Key Antihyperglycemic Medications            metFORMIN (GLUCOPHAGE) 1000 MG tablet (Taking)    Class: Historical Med        . A1c levels reviewed--will recheck. Encourage routine foot care. Recommend routine eye exams. Encourage diet and exercise and medication adherence.   -Tolerating medication well and achieving desired therapeutic response. Will continue with:   Key Antihyperglycemic Medications            metFORMIN (GLUCOPHAGE) 1000 MG tablet (Taking)    Class: Historical Med        . A1c levels reviewed--will recheck. Encourage routine foot care. Recommend routine eye exams. Encourage diet and exercise and medication adherence. -Dexa due.  -Fall precautions.  -Offered referral to pain management, pt declines. Recommendations for Preventive Services Due: see orders and patient instructions/AVS.  Recommended screening schedule for the next 5-10 years is provided to the patient in written form: see Patient Instructions/AVS.     Return in 6 months (on 1/17/2024).      Subjective   The following acute

## 2023-07-18 LAB
CREAT UR-MCNC: 31 MG/DL
EST. AVERAGE GLUCOSE BLD GHB EST-MCNC: 128 MG/DL
HBA1C MFR BLD: 6.1 % (ref 4.8–5.6)
MICROALBUMIN UR-MCNC: <0.5 MG/DL
MICROALBUMIN/CREAT UR-RTO: NORMAL MG/G (ref 0–30)

## 2023-07-19 ENCOUNTER — TELEPHONE (OUTPATIENT)
Dept: INTERNAL MEDICINE CLINIC | Facility: CLINIC | Age: 68
End: 2023-07-19

## 2023-07-19 DIAGNOSIS — D64.9 ANEMIA, UNSPECIFIED TYPE: ICD-10-CM

## 2023-07-19 LAB
HGB RETIC QN AUTO: 31 PG (ref 29–35)
IMM RETICS NFR: 11.6 % (ref 3–15.9)
IRON SERPL-MCNC: 81 UG/DL (ref 35–150)
RETICS # AUTO: 0.06 M/UL (ref 0.03–0.1)
RETICS/RBC NFR AUTO: 1.5 % (ref 0.3–2)
TIBC SERPL-MCNC: 304 UG/DL (ref 250–450)

## 2023-07-19 NOTE — TELEPHONE ENCOUNTER
Lab called and stated that they are unable to add on Vit B12 due to not having a diagnosis code that would cover lab. They state that they could add on everything else. Please advise if this is okay or if patient needs the Vit B12.

## 2023-07-21 LAB
FERRITIN SERPL-MCNC: 60 NG/ML (ref 8–388)
IRON SATN MFR SERPL: 28 %
IRON SERPL-MCNC: 81 UG/DL (ref 35–150)
TIBC SERPL-MCNC: 292 UG/DL (ref 250–450)

## 2023-08-04 ENCOUNTER — HOSPITAL ENCOUNTER (OUTPATIENT)
Dept: MAMMOGRAPHY | Age: 68
End: 2023-08-04
Attending: FAMILY MEDICINE
Payer: MEDICARE

## 2023-08-04 DIAGNOSIS — Z12.31 VISIT FOR SCREENING MAMMOGRAM: ICD-10-CM

## 2023-08-04 DIAGNOSIS — Z78.0 POSTMENOPAUSAL: ICD-10-CM

## 2023-08-04 PROCEDURE — 77080 DXA BONE DENSITY AXIAL: CPT

## 2023-08-04 PROCEDURE — 77067 SCR MAMMO BI INCL CAD: CPT

## 2023-08-07 DIAGNOSIS — I10 ESSENTIAL HYPERTENSION: ICD-10-CM

## 2023-08-07 NOTE — TELEPHONE ENCOUNTER
Patient called requesting refills on her metFORMIN (GLUCOPHAGE) 1000 MG tablet, pravastatin (PRAVACHOL) 40 MG tablet. Patient states that the pharmacy is telling her that they do not have a prescription for her lisinopril-hydroCHLOROthiazide (PRINZIDE;ZESTORETIC) 20-12.5 MG per tablet. Please Advise.          [facility] in [place]

## 2023-08-09 RX ORDER — PRAVASTATIN SODIUM 40 MG
40 TABLET ORAL DAILY
Qty: 90 TABLET | Refills: 1 | Status: SHIPPED | OUTPATIENT
Start: 2023-08-09

## 2023-08-09 RX ORDER — LISINOPRIL AND HYDROCHLOROTHIAZIDE 20; 12.5 MG/1; MG/1
1 TABLET ORAL DAILY
Qty: 90 TABLET | Refills: 1 | Status: SHIPPED | OUTPATIENT
Start: 2023-08-09

## 2024-01-09 ENCOUNTER — HOSPITAL ENCOUNTER (INPATIENT)
Age: 69
LOS: 4 days | Discharge: HOME HEALTH | DRG: 481 | End: 2024-01-14
Attending: EMERGENCY MEDICINE | Admitting: HOSPITALIST
Payer: MEDICARE

## 2024-01-09 ENCOUNTER — APPOINTMENT (OUTPATIENT)
Dept: GENERAL RADIOLOGY | Age: 69
DRG: 481 | End: 2024-01-09
Payer: MEDICARE

## 2024-01-09 DIAGNOSIS — S72.001A CLOSED FRACTURE OF RIGHT HIP, INITIAL ENCOUNTER (HCC): ICD-10-CM

## 2024-01-09 DIAGNOSIS — S72.001D CLOSED FRACTURE OF RIGHT HIP WITH ROUTINE HEALING, SUBSEQUENT ENCOUNTER: Primary | ICD-10-CM

## 2024-01-09 LAB
BASOPHILS # BLD: 0.1 K/UL (ref 0–0.2)
BASOPHILS NFR BLD: 0 % (ref 0–2)
DIFFERENTIAL METHOD BLD: ABNORMAL
EOSINOPHIL # BLD: 0 K/UL (ref 0–0.8)
EOSINOPHIL NFR BLD: 0 % (ref 0.5–7.8)
ERYTHROCYTE [DISTWIDTH] IN BLOOD BY AUTOMATED COUNT: 13 % (ref 11.9–14.6)
HCT VFR BLD AUTO: 35.4 % (ref 35.8–46.3)
HGB BLD-MCNC: 11.3 G/DL (ref 11.7–15.4)
IMM GRANULOCYTES # BLD AUTO: 0.1 K/UL (ref 0–0.5)
IMM GRANULOCYTES NFR BLD AUTO: 1 % (ref 0–5)
LYMPHOCYTES # BLD: 1.8 K/UL (ref 0.5–4.6)
LYMPHOCYTES NFR BLD: 11 % (ref 13–44)
MCH RBC QN AUTO: 28.3 PG (ref 26.1–32.9)
MCHC RBC AUTO-ENTMCNC: 31.9 G/DL (ref 31.4–35)
MCV RBC AUTO: 88.7 FL (ref 82–102)
MONOCYTES # BLD: 0.9 K/UL (ref 0.1–1.3)
MONOCYTES NFR BLD: 5 % (ref 4–12)
NEUTS SEG # BLD: 13.9 K/UL (ref 1.7–8.2)
NEUTS SEG NFR BLD: 83 % (ref 43–78)
NRBC # BLD: 0 K/UL (ref 0–0.2)
PLATELET # BLD AUTO: 177 K/UL (ref 150–450)
PMV BLD AUTO: 10.3 FL (ref 9.4–12.3)
RBC # BLD AUTO: 3.99 M/UL (ref 4.05–5.2)
WBC # BLD AUTO: 16.8 K/UL (ref 4.3–11.1)

## 2024-01-09 PROCEDURE — 73630 X-RAY EXAM OF FOOT: CPT

## 2024-01-09 PROCEDURE — 85025 COMPLETE CBC W/AUTO DIFF WBC: CPT

## 2024-01-09 PROCEDURE — 71045 X-RAY EXAM CHEST 1 VIEW: CPT

## 2024-01-09 PROCEDURE — 73502 X-RAY EXAM HIP UNI 2-3 VIEWS: CPT

## 2024-01-09 PROCEDURE — 80053 COMPREHEN METABOLIC PANEL: CPT

## 2024-01-09 PROCEDURE — 93005 ELECTROCARDIOGRAM TRACING: CPT | Performed by: EMERGENCY MEDICINE

## 2024-01-09 PROCEDURE — 73552 X-RAY EXAM OF FEMUR 2/>: CPT

## 2024-01-09 PROCEDURE — 96374 THER/PROPH/DIAG INJ IV PUSH: CPT

## 2024-01-09 PROCEDURE — 99285 EMERGENCY DEPT VISIT HI MDM: CPT

## 2024-01-09 PROCEDURE — 6360000002 HC RX W HCPCS: Performed by: EMERGENCY MEDICINE

## 2024-01-09 PROCEDURE — 73560 X-RAY EXAM OF KNEE 1 OR 2: CPT

## 2024-01-09 PROCEDURE — 73590 X-RAY EXAM OF LOWER LEG: CPT

## 2024-01-09 RX ORDER — MORPHINE SULFATE 4 MG/ML
4 INJECTION, SOLUTION INTRAMUSCULAR; INTRAVENOUS
Status: COMPLETED | OUTPATIENT
Start: 2024-01-09 | End: 2024-01-09

## 2024-01-09 RX ORDER — 0.9 % SODIUM CHLORIDE 0.9 %
1000 INTRAVENOUS SOLUTION INTRAVENOUS ONCE
Status: COMPLETED | OUTPATIENT
Start: 2024-01-09 | End: 2024-01-10

## 2024-01-09 RX ADMIN — MORPHINE SULFATE 4 MG: 4 INJECTION INTRAVENOUS at 22:58

## 2024-01-09 ASSESSMENT — PAIN DESCRIPTION - LOCATION
LOCATION: HIP
LOCATION: HIP

## 2024-01-09 ASSESSMENT — LIFESTYLE VARIABLES
HOW OFTEN DO YOU HAVE A DRINK CONTAINING ALCOHOL: NEVER
HOW MANY STANDARD DRINKS CONTAINING ALCOHOL DO YOU HAVE ON A TYPICAL DAY: PATIENT DOES NOT DRINK

## 2024-01-09 ASSESSMENT — PAIN DESCRIPTION - DESCRIPTORS
DESCRIPTORS: THROBBING
DESCRIPTORS: SHARP

## 2024-01-09 ASSESSMENT — PAIN DESCRIPTION - ORIENTATION
ORIENTATION: RIGHT
ORIENTATION: RIGHT

## 2024-01-09 ASSESSMENT — PAIN SCALES - GENERAL
PAINLEVEL_OUTOF10: 8
PAINLEVEL_OUTOF10: 9

## 2024-01-09 ASSESSMENT — PAIN - FUNCTIONAL ASSESSMENT: PAIN_FUNCTIONAL_ASSESSMENT: 0-10

## 2024-01-10 ENCOUNTER — ANESTHESIA (OUTPATIENT)
Dept: SURGERY | Age: 69
End: 2024-01-10
Payer: MEDICARE

## 2024-01-10 ENCOUNTER — ANESTHESIA EVENT (OUTPATIENT)
Dept: SURGERY | Age: 69
End: 2024-01-10
Payer: MEDICARE

## 2024-01-10 ENCOUNTER — APPOINTMENT (OUTPATIENT)
Dept: GENERAL RADIOLOGY | Age: 69
DRG: 481 | End: 2024-01-10
Payer: MEDICARE

## 2024-01-10 PROBLEM — N39.0 UTI (URINARY TRACT INFECTION): Status: ACTIVE | Noted: 2024-01-10

## 2024-01-10 PROBLEM — S72.001A CLOSED RIGHT HIP FRACTURE, INITIAL ENCOUNTER (HCC): Status: ACTIVE | Noted: 2024-01-10

## 2024-01-10 PROBLEM — D72.829 LEUKOCYTOSIS: Status: ACTIVE | Noted: 2024-01-10

## 2024-01-10 LAB
ABO + RH BLD: NORMAL
ALBUMIN SERPL-MCNC: 3.1 G/DL (ref 3.2–4.6)
ALBUMIN/GLOB SERPL: 0.9 (ref 0.4–1.6)
ALP SERPL-CCNC: 56 U/L (ref 50–136)
ALT SERPL-CCNC: 37 U/L (ref 12–65)
ANION GAP SERPL CALC-SCNC: 4 MMOL/L (ref 2–11)
APPEARANCE UR: CLEAR
AST SERPL-CCNC: 24 U/L (ref 15–37)
BACTERIA URNS QL MICRO: ABNORMAL /HPF
BILIRUB SERPL-MCNC: 0.4 MG/DL (ref 0.2–1.1)
BILIRUB UR QL: NEGATIVE
BILIRUB UR QL: NEGATIVE
BLOOD GROUP ANTIBODIES SERPL: NORMAL
BUN SERPL-MCNC: 14 MG/DL (ref 8–23)
CALCIUM SERPL-MCNC: 8.6 MG/DL (ref 8.3–10.4)
CASTS URNS QL MICRO: ABNORMAL /LPF
CHLORIDE SERPL-SCNC: 110 MMOL/L (ref 103–113)
CO2 SERPL-SCNC: 24 MMOL/L (ref 21–32)
COLOR UR: ABNORMAL
CREAT SERPL-MCNC: 0.9 MG/DL (ref 0.6–1)
EKG ATRIAL RATE: 87 BPM
EKG DIAGNOSIS: NORMAL
EKG P AXIS: 71 DEGREES
EKG P-R INTERVAL: 108 MS
EKG Q-T INTERVAL: 355 MS
EKG QRS DURATION: 80 MS
EKG QTC CALCULATION (BAZETT): 418 MS
EKG R AXIS: 72 DEGREES
EKG T AXIS: 26 DEGREES
EKG VENTRICULAR RATE: 83 BPM
EPI CELLS #/AREA URNS HPF: ABNORMAL /HPF
GLOBULIN SER CALC-MCNC: 3.5 G/DL (ref 2.8–4.5)
GLUCOSE BLD STRIP.AUTO-MCNC: 125 MG/DL (ref 65–100)
GLUCOSE BLD STRIP.AUTO-MCNC: 204 MG/DL (ref 65–100)
GLUCOSE BLD STRIP.AUTO-MCNC: 91 MG/DL (ref 65–100)
GLUCOSE SERPL-MCNC: 132 MG/DL (ref 65–100)
GLUCOSE UR QL STRIP.AUTO: NEGATIVE MG/DL
GLUCOSE UR STRIP.AUTO-MCNC: NEGATIVE MG/DL
HGB UR QL STRIP: NEGATIVE
KETONES UR QL STRIP.AUTO: ABNORMAL MG/DL
KETONES UR-MCNC: ABNORMAL MG/DL
LEUKOCYTE ESTERASE UR QL STRIP.AUTO: ABNORMAL
LEUKOCYTE ESTERASE UR QL STRIP: ABNORMAL
MUCOUS THREADS URNS QL MICRO: 0 /LPF
NITRITE UR QL STRIP.AUTO: NEGATIVE
NITRITE UR QL: NEGATIVE
PH UR STRIP: 7 (ref 5–9)
PH UR: 7 (ref 5–9)
POTASSIUM SERPL-SCNC: 4 MMOL/L (ref 3.5–5.1)
PROT SERPL-MCNC: 6.6 G/DL (ref 6.3–8.2)
PROT UR QL: NEGATIVE MG/DL
PROT UR STRIP-MCNC: NEGATIVE MG/DL
RBC # UR STRIP: NEGATIVE
RBC #/AREA URNS HPF: ABNORMAL /HPF
SERVICE CMNT-IMP: ABNORMAL
SERVICE CMNT-IMP: NORMAL
SODIUM SERPL-SCNC: 138 MMOL/L (ref 136–146)
SP GR UR REFRACTOMETRY: 1.01 (ref 1–1.02)
SP GR UR: 1.01 (ref 1–1.02)
SPECIMEN EXP DATE BLD: NORMAL
URINE CULTURE IF INDICATED: ABNORMAL
UROBILINOGEN UR QL STRIP.AUTO: 1 EU/DL (ref 0.2–1)
UROBILINOGEN UR QL: 1 EU/DL (ref 0.2–1)
WBC URNS QL MICRO: ABNORMAL /HPF

## 2024-01-10 PROCEDURE — 73552 X-RAY EXAM OF FEMUR 2/>: CPT

## 2024-01-10 PROCEDURE — 0QS606Z REPOSITION RIGHT UPPER FEMUR WITH INTRAMEDULLARY INTERNAL FIXATION DEVICE, OPEN APPROACH: ICD-10-PCS | Performed by: ORTHOPAEDIC SURGERY

## 2024-01-10 PROCEDURE — 2780000008 HC MISC ORTHO HDWR PERM >$1000: Performed by: ORTHOPAEDIC SURGERY

## 2024-01-10 PROCEDURE — 2720000010 HC SURG SUPPLY STERILE: Performed by: ORTHOPAEDIC SURGERY

## 2024-01-10 PROCEDURE — 1100000000 HC RM PRIVATE

## 2024-01-10 PROCEDURE — 2500000003 HC RX 250 WO HCPCS: Performed by: NURSE PRACTITIONER

## 2024-01-10 PROCEDURE — 2580000003 HC RX 258: Performed by: NURSE PRACTITIONER

## 2024-01-10 PROCEDURE — 6360000002 HC RX W HCPCS: Performed by: ORTHOPAEDIC SURGERY

## 2024-01-10 PROCEDURE — 3700000001 HC ADD 15 MINUTES (ANESTHESIA): Performed by: ORTHOPAEDIC SURGERY

## 2024-01-10 PROCEDURE — 3600000004 HC SURGERY LEVEL 4 BASE: Performed by: ORTHOPAEDIC SURGERY

## 2024-01-10 PROCEDURE — 6360000002 HC RX W HCPCS: Performed by: NURSE ANESTHETIST, CERTIFIED REGISTERED

## 2024-01-10 PROCEDURE — 36415 COLL VENOUS BLD VENIPUNCTURE: CPT

## 2024-01-10 PROCEDURE — 7100000001 HC PACU RECOVERY - ADDTL 15 MIN: Performed by: ORTHOPAEDIC SURGERY

## 2024-01-10 PROCEDURE — 6370000000 HC RX 637 (ALT 250 FOR IP): Performed by: ORTHOPAEDIC SURGERY

## 2024-01-10 PROCEDURE — 87086 URINE CULTURE/COLONY COUNT: CPT

## 2024-01-10 PROCEDURE — 99222 1ST HOSP IP/OBS MODERATE 55: CPT | Performed by: ORTHOPAEDIC SURGERY

## 2024-01-10 PROCEDURE — 6370000000 HC RX 637 (ALT 250 FOR IP): Performed by: NURSE PRACTITIONER

## 2024-01-10 PROCEDURE — 2580000003 HC RX 258: Performed by: EMERGENCY MEDICINE

## 2024-01-10 PROCEDURE — 3600000014 HC SURGERY LEVEL 4 ADDTL 15MIN: Performed by: ORTHOPAEDIC SURGERY

## 2024-01-10 PROCEDURE — 86901 BLOOD TYPING SEROLOGIC RH(D): CPT

## 2024-01-10 PROCEDURE — 3700000000 HC ANESTHESIA ATTENDED CARE: Performed by: ORTHOPAEDIC SURGERY

## 2024-01-10 PROCEDURE — 81003 URINALYSIS AUTO W/O SCOPE: CPT

## 2024-01-10 PROCEDURE — 2500000003 HC RX 250 WO HCPCS: Performed by: NURSE ANESTHETIST, CERTIFIED REGISTERED

## 2024-01-10 PROCEDURE — 86850 RBC ANTIBODY SCREEN: CPT

## 2024-01-10 PROCEDURE — 6360000002 HC RX W HCPCS: Performed by: ANESTHESIOLOGY

## 2024-01-10 PROCEDURE — 6370000000 HC RX 637 (ALT 250 FOR IP): Performed by: ANESTHESIOLOGY

## 2024-01-10 PROCEDURE — 82962 GLUCOSE BLOOD TEST: CPT

## 2024-01-10 PROCEDURE — 2580000003 HC RX 258: Performed by: ORTHOPAEDIC SURGERY

## 2024-01-10 PROCEDURE — 86900 BLOOD TYPING SEROLOGIC ABO: CPT

## 2024-01-10 PROCEDURE — 2709999900 HC NON-CHARGEABLE SUPPLY: Performed by: ORTHOPAEDIC SURGERY

## 2024-01-10 PROCEDURE — 81001 URINALYSIS AUTO W/SCOPE: CPT

## 2024-01-10 PROCEDURE — 2580000003 HC RX 258: Performed by: ANESTHESIOLOGY

## 2024-01-10 PROCEDURE — 7100000000 HC PACU RECOVERY - FIRST 15 MIN: Performed by: ORTHOPAEDIC SURGERY

## 2024-01-10 PROCEDURE — 6360000002 HC RX W HCPCS: Performed by: NURSE PRACTITIONER

## 2024-01-10 PROCEDURE — C1713 ANCHOR/SCREW BN/BN,TIS/BN: HCPCS | Performed by: ORTHOPAEDIC SURGERY

## 2024-01-10 PROCEDURE — 93010 ELECTROCARDIOGRAM REPORT: CPT | Performed by: INTERNAL MEDICINE

## 2024-01-10 PROCEDURE — C1769 GUIDE WIRE: HCPCS | Performed by: ORTHOPAEDIC SURGERY

## 2024-01-10 DEVICE — LOCKING SCREW
Type: IMPLANTABLE DEVICE | Status: FUNCTIONAL
Brand: T2 ALPHA

## 2024-01-10 DEVICE — LAG SCREW
Type: IMPLANTABLE DEVICE | Status: FUNCTIONAL
Brand: GAMMA

## 2024-01-10 RX ORDER — GABAPENTIN 100 MG/1
100 CAPSULE ORAL 3 TIMES DAILY
Status: DISCONTINUED | OUTPATIENT
Start: 2024-01-10 | End: 2024-01-14 | Stop reason: HOSPADM

## 2024-01-10 RX ORDER — CLINDAMYCIN PHOSPHATE 600 MG/50ML
600 INJECTION, SOLUTION INTRAVENOUS
Status: COMPLETED | OUTPATIENT
Start: 2024-01-10 | End: 2024-01-10

## 2024-01-10 RX ORDER — IBUPROFEN 600 MG/1
1 TABLET ORAL PRN
Status: DISCONTINUED | OUTPATIENT
Start: 2024-01-09 | End: 2024-01-14 | Stop reason: HOSPADM

## 2024-01-10 RX ORDER — LIDOCAINE HYDROCHLORIDE 20 MG/ML
INJECTION, SOLUTION EPIDURAL; INFILTRATION; INTRACAUDAL; PERINEURAL PRN
Status: DISCONTINUED | OUTPATIENT
Start: 2024-01-10 | End: 2024-01-10 | Stop reason: SDUPTHER

## 2024-01-10 RX ORDER — HYDROCODONE BITARTRATE AND ACETAMINOPHEN 5; 325 MG/1; MG/1
1 TABLET ORAL ONCE AS NEEDED
Status: COMPLETED | OUTPATIENT
Start: 2024-01-10 | End: 2024-01-10

## 2024-01-10 RX ORDER — FENTANYL CITRATE 50 UG/ML
INJECTION, SOLUTION INTRAMUSCULAR; INTRAVENOUS PRN
Status: DISCONTINUED | OUTPATIENT
Start: 2024-01-10 | End: 2024-01-10 | Stop reason: SDUPTHER

## 2024-01-10 RX ORDER — DEXTROSE MONOHYDRATE 100 MG/ML
INJECTION, SOLUTION INTRAVENOUS CONTINUOUS PRN
Status: DISCONTINUED | OUTPATIENT
Start: 2024-01-10 | End: 2024-01-10 | Stop reason: HOSPADM

## 2024-01-10 RX ORDER — SODIUM CHLORIDE 9 MG/ML
INJECTION, SOLUTION INTRAVENOUS PRN
Status: DISCONTINUED | OUTPATIENT
Start: 2024-01-10 | End: 2024-01-10 | Stop reason: HOSPADM

## 2024-01-10 RX ORDER — FAMOTIDINE 20 MG/1
20 TABLET, FILM COATED ORAL DAILY
Status: DISCONTINUED | OUTPATIENT
Start: 2024-01-11 | End: 2024-01-14 | Stop reason: HOSPADM

## 2024-01-10 RX ORDER — HYDROCHLOROTHIAZIDE 25 MG/1
12.5 TABLET ORAL DAILY
Status: DISCONTINUED | OUTPATIENT
Start: 2024-01-10 | End: 2024-01-10

## 2024-01-10 RX ORDER — INSULIN LISPRO 100 [IU]/ML
0-8 INJECTION, SOLUTION INTRAVENOUS; SUBCUTANEOUS
Status: DISCONTINUED | OUTPATIENT
Start: 2024-01-10 | End: 2024-01-14 | Stop reason: HOSPADM

## 2024-01-10 RX ORDER — LISINOPRIL 20 MG/1
20 TABLET ORAL DAILY
Status: DISCONTINUED | OUTPATIENT
Start: 2024-01-10 | End: 2024-01-10

## 2024-01-10 RX ORDER — POTASSIUM CHLORIDE 20 MEQ/1
40 TABLET, EXTENDED RELEASE ORAL PRN
Status: DISCONTINUED | OUTPATIENT
Start: 2024-01-10 | End: 2024-01-14 | Stop reason: HOSPADM

## 2024-01-10 RX ORDER — INSULIN LISPRO 100 [IU]/ML
0-4 INJECTION, SOLUTION INTRAVENOUS; SUBCUTANEOUS NIGHTLY
Status: DISCONTINUED | OUTPATIENT
Start: 2024-01-10 | End: 2024-01-14 | Stop reason: HOSPADM

## 2024-01-10 RX ORDER — SODIUM CHLORIDE 0.9 % (FLUSH) 0.9 %
5-40 SYRINGE (ML) INJECTION PRN
Status: DISCONTINUED | OUTPATIENT
Start: 2024-01-10 | End: 2024-01-14 | Stop reason: HOSPADM

## 2024-01-10 RX ORDER — MIDAZOLAM HYDROCHLORIDE 1 MG/ML
INJECTION INTRAMUSCULAR; INTRAVENOUS PRN
Status: DISCONTINUED | OUTPATIENT
Start: 2024-01-10 | End: 2024-01-10 | Stop reason: SDUPTHER

## 2024-01-10 RX ORDER — SODIUM CHLORIDE, SODIUM LACTATE, POTASSIUM CHLORIDE, CALCIUM CHLORIDE 600; 310; 30; 20 MG/100ML; MG/100ML; MG/100ML; MG/100ML
INJECTION, SOLUTION INTRAVENOUS CONTINUOUS
Status: DISCONTINUED | OUTPATIENT
Start: 2024-01-10 | End: 2024-01-10 | Stop reason: HOSPADM

## 2024-01-10 RX ORDER — POTASSIUM CHLORIDE 7.45 MG/ML
10 INJECTION INTRAVENOUS PRN
Status: DISCONTINUED | OUTPATIENT
Start: 2024-01-10 | End: 2024-01-14 | Stop reason: HOSPADM

## 2024-01-10 RX ORDER — HYDROMORPHONE HYDROCHLORIDE 2 MG/ML
0.25 INJECTION, SOLUTION INTRAMUSCULAR; INTRAVENOUS; SUBCUTANEOUS EVERY 5 MIN PRN
Status: DISCONTINUED | OUTPATIENT
Start: 2024-01-10 | End: 2024-01-10 | Stop reason: HOSPADM

## 2024-01-10 RX ORDER — LIDOCAINE HYDROCHLORIDE 10 MG/ML
1 INJECTION, SOLUTION INFILTRATION; PERINEURAL
Status: DISCONTINUED | OUTPATIENT
Start: 2024-01-10 | End: 2024-01-10 | Stop reason: HOSPADM

## 2024-01-10 RX ORDER — SODIUM CHLORIDE 0.9 % (FLUSH) 0.9 %
5-40 SYRINGE (ML) INJECTION EVERY 12 HOURS SCHEDULED
Status: DISCONTINUED | OUTPATIENT
Start: 2024-01-10 | End: 2024-01-10 | Stop reason: HOSPADM

## 2024-01-10 RX ORDER — MIDAZOLAM HYDROCHLORIDE 2 MG/2ML
2 INJECTION, SOLUTION INTRAMUSCULAR; INTRAVENOUS
Status: DISCONTINUED | OUTPATIENT
Start: 2024-01-10 | End: 2024-01-10 | Stop reason: HOSPADM

## 2024-01-10 RX ORDER — PRAVASTATIN SODIUM 20 MG
40 TABLET ORAL NIGHTLY
Status: DISCONTINUED | OUTPATIENT
Start: 2024-01-10 | End: 2024-01-14 | Stop reason: HOSPADM

## 2024-01-10 RX ORDER — HALOPERIDOL 5 MG/ML
1 INJECTION INTRAMUSCULAR
Status: DISCONTINUED | OUTPATIENT
Start: 2024-01-10 | End: 2024-01-10 | Stop reason: HOSPADM

## 2024-01-10 RX ORDER — MAGNESIUM SULFATE IN WATER 40 MG/ML
2000 INJECTION, SOLUTION INTRAVENOUS PRN
Status: DISCONTINUED | OUTPATIENT
Start: 2024-01-10 | End: 2024-01-14 | Stop reason: HOSPADM

## 2024-01-10 RX ORDER — SODIUM CHLORIDE 0.9 % (FLUSH) 0.9 %
5-40 SYRINGE (ML) INJECTION PRN
Status: DISCONTINUED | OUTPATIENT
Start: 2024-01-10 | End: 2024-01-10 | Stop reason: SDUPTHER

## 2024-01-10 RX ORDER — DEXAMETHASONE SODIUM PHOSPHATE 4 MG/ML
INJECTION, SOLUTION INTRA-ARTICULAR; INTRALESIONAL; INTRAMUSCULAR; INTRAVENOUS; SOFT TISSUE PRN
Status: DISCONTINUED | OUTPATIENT
Start: 2024-01-10 | End: 2024-01-10 | Stop reason: SDUPTHER

## 2024-01-10 RX ORDER — SODIUM CHLORIDE 9 MG/ML
INJECTION, SOLUTION INTRAVENOUS CONTINUOUS
Status: DISCONTINUED | OUTPATIENT
Start: 2024-01-10 | End: 2024-01-11

## 2024-01-10 RX ORDER — LISINOPRIL 20 MG/1
20 TABLET ORAL NIGHTLY
Status: DISCONTINUED | OUTPATIENT
Start: 2024-01-10 | End: 2024-01-10

## 2024-01-10 RX ORDER — SODIUM CHLORIDE, SODIUM LACTATE, POTASSIUM CHLORIDE, CALCIUM CHLORIDE 600; 310; 30; 20 MG/100ML; MG/100ML; MG/100ML; MG/100ML
INJECTION, SOLUTION INTRAVENOUS CONTINUOUS
Status: DISCONTINUED | OUTPATIENT
Start: 2024-01-10 | End: 2024-01-10

## 2024-01-10 RX ORDER — ACETAMINOPHEN 650 MG/1
650 SUPPOSITORY RECTAL EVERY 6 HOURS PRN
Status: DISCONTINUED | OUTPATIENT
Start: 2024-01-10 | End: 2024-01-14 | Stop reason: HOSPADM

## 2024-01-10 RX ORDER — SODIUM CHLORIDE 0.9 % (FLUSH) 0.9 %
5-40 SYRINGE (ML) INJECTION EVERY 12 HOURS SCHEDULED
Status: DISCONTINUED | OUTPATIENT
Start: 2024-01-10 | End: 2024-01-14 | Stop reason: HOSPADM

## 2024-01-10 RX ORDER — LISINOPRIL 5 MG/1
5 TABLET ORAL NIGHTLY
Status: DISCONTINUED | OUTPATIENT
Start: 2024-01-10 | End: 2024-01-14 | Stop reason: HOSPADM

## 2024-01-10 RX ORDER — SODIUM CHLORIDE 0.9 % (FLUSH) 0.9 %
5-40 SYRINGE (ML) INJECTION EVERY 12 HOURS SCHEDULED
Status: DISCONTINUED | OUTPATIENT
Start: 2024-01-10 | End: 2024-01-10 | Stop reason: SDUPTHER

## 2024-01-10 RX ORDER — ONDANSETRON 2 MG/ML
4 INJECTION INTRAMUSCULAR; INTRAVENOUS EVERY 6 HOURS PRN
Status: DISCONTINUED | OUTPATIENT
Start: 2024-01-10 | End: 2024-01-14 | Stop reason: HOSPADM

## 2024-01-10 RX ORDER — ACETAMINOPHEN 500 MG
1000 TABLET ORAL ONCE
Status: COMPLETED | OUTPATIENT
Start: 2024-01-10 | End: 2024-01-10

## 2024-01-10 RX ORDER — DEXTROSE MONOHYDRATE 100 MG/ML
INJECTION, SOLUTION INTRAVENOUS CONTINUOUS PRN
Status: DISCONTINUED | OUTPATIENT
Start: 2024-01-09 | End: 2024-01-14 | Stop reason: HOSPADM

## 2024-01-10 RX ORDER — LISINOPRIL AND HYDROCHLOROTHIAZIDE 20; 12.5 MG/1; MG/1
1 TABLET ORAL DAILY
Status: DISCONTINUED | OUTPATIENT
Start: 2024-01-10 | End: 2024-01-10 | Stop reason: CLARIF

## 2024-01-10 RX ORDER — SODIUM CHLORIDE 0.9 % (FLUSH) 0.9 %
5-40 SYRINGE (ML) INJECTION PRN
Status: DISCONTINUED | OUTPATIENT
Start: 2024-01-10 | End: 2024-01-10 | Stop reason: HOSPADM

## 2024-01-10 RX ORDER — ONDANSETRON 2 MG/ML
INJECTION INTRAMUSCULAR; INTRAVENOUS PRN
Status: DISCONTINUED | OUTPATIENT
Start: 2024-01-10 | End: 2024-01-10 | Stop reason: SDUPTHER

## 2024-01-10 RX ORDER — OXYCODONE HYDROCHLORIDE 5 MG/1
10 TABLET ORAL EVERY 4 HOURS PRN
Status: DISCONTINUED | OUTPATIENT
Start: 2024-01-10 | End: 2024-01-14 | Stop reason: HOSPADM

## 2024-01-10 RX ORDER — ROCURONIUM BROMIDE 10 MG/ML
INJECTION, SOLUTION INTRAVENOUS PRN
Status: DISCONTINUED | OUTPATIENT
Start: 2024-01-10 | End: 2024-01-10 | Stop reason: SDUPTHER

## 2024-01-10 RX ORDER — CLINDAMYCIN PHOSPHATE 600 MG/50ML
600 INJECTION, SOLUTION INTRAVENOUS
Status: DISCONTINUED | OUTPATIENT
Start: 2024-01-10 | End: 2024-01-10 | Stop reason: ALTCHOICE

## 2024-01-10 RX ORDER — ONDANSETRON 4 MG/1
4 TABLET, ORALLY DISINTEGRATING ORAL EVERY 8 HOURS PRN
Status: DISCONTINUED | OUTPATIENT
Start: 2024-01-10 | End: 2024-01-14 | Stop reason: HOSPADM

## 2024-01-10 RX ORDER — NEOSTIGMINE METHYLSULFATE 1 MG/ML
INJECTION, SOLUTION INTRAVENOUS PRN
Status: DISCONTINUED | OUTPATIENT
Start: 2024-01-10 | End: 2024-01-10 | Stop reason: SDUPTHER

## 2024-01-10 RX ORDER — FAMOTIDINE 20 MG/1
20 TABLET, FILM COATED ORAL 2 TIMES DAILY
Status: DISCONTINUED | OUTPATIENT
Start: 2024-01-10 | End: 2024-01-10

## 2024-01-10 RX ORDER — GLYCOPYRROLATE 0.2 MG/ML
INJECTION INTRAMUSCULAR; INTRAVENOUS PRN
Status: DISCONTINUED | OUTPATIENT
Start: 2024-01-10 | End: 2024-01-10 | Stop reason: SDUPTHER

## 2024-01-10 RX ORDER — MORPHINE SULFATE 2 MG/ML
2 INJECTION, SOLUTION INTRAMUSCULAR; INTRAVENOUS EVERY 4 HOURS PRN
Status: DISCONTINUED | OUTPATIENT
Start: 2024-01-09 | End: 2024-01-10

## 2024-01-10 RX ORDER — SODIUM CHLORIDE 9 MG/ML
INJECTION, SOLUTION INTRAVENOUS PRN
Status: DISCONTINUED | OUTPATIENT
Start: 2024-01-10 | End: 2024-01-10 | Stop reason: SDUPTHER

## 2024-01-10 RX ORDER — SODIUM CHLORIDE, SODIUM LACTATE, POTASSIUM CHLORIDE, CALCIUM CHLORIDE 600; 310; 30; 20 MG/100ML; MG/100ML; MG/100ML; MG/100ML
INJECTION, SOLUTION INTRAVENOUS CONTINUOUS
Status: ACTIVE | OUTPATIENT
Start: 2024-01-10 | End: 2024-01-12

## 2024-01-10 RX ORDER — ACETAMINOPHEN 325 MG/1
650 TABLET ORAL EVERY 6 HOURS PRN
Status: DISCONTINUED | OUTPATIENT
Start: 2024-01-10 | End: 2024-01-14 | Stop reason: HOSPADM

## 2024-01-10 RX ORDER — PROPOFOL 10 MG/ML
INJECTION, EMULSION INTRAVENOUS PRN
Status: DISCONTINUED | OUTPATIENT
Start: 2024-01-10 | End: 2024-01-10 | Stop reason: SDUPTHER

## 2024-01-10 RX ORDER — CEFAZOLIN SODIUM 1 G/3ML
INJECTION, POWDER, FOR SOLUTION INTRAMUSCULAR; INTRAVENOUS PRN
Status: DISCONTINUED | OUTPATIENT
Start: 2024-01-10 | End: 2024-01-10 | Stop reason: SDUPTHER

## 2024-01-10 RX ORDER — ONDANSETRON 2 MG/ML
4 INJECTION INTRAMUSCULAR; INTRAVENOUS
Status: DISCONTINUED | OUTPATIENT
Start: 2024-01-10 | End: 2024-01-10 | Stop reason: HOSPADM

## 2024-01-10 RX ORDER — POLYETHYLENE GLYCOL 3350 17 G/17G
17 POWDER, FOR SOLUTION ORAL DAILY PRN
Status: DISCONTINUED | OUTPATIENT
Start: 2024-01-10 | End: 2024-01-14 | Stop reason: HOSPADM

## 2024-01-10 RX ORDER — OXYCODONE HYDROCHLORIDE 5 MG/1
5 TABLET ORAL EVERY 4 HOURS PRN
Status: DISCONTINUED | OUTPATIENT
Start: 2024-01-10 | End: 2024-01-10 | Stop reason: SDUPTHER

## 2024-01-10 RX ORDER — ASPIRIN 325 MG
325 TABLET, DELAYED RELEASE (ENTERIC COATED) ORAL DAILY
Status: DISCONTINUED | OUTPATIENT
Start: 2024-01-10 | End: 2024-01-14 | Stop reason: HOSPADM

## 2024-01-10 RX ORDER — SODIUM CHLORIDE 9 MG/ML
INJECTION, SOLUTION INTRAVENOUS PRN
Status: DISCONTINUED | OUTPATIENT
Start: 2024-01-10 | End: 2024-01-14 | Stop reason: HOSPADM

## 2024-01-10 RX ORDER — EPHEDRINE SULFATE/0.9% NACL/PF 50 MG/5 ML
SYRINGE (ML) INTRAVENOUS PRN
Status: DISCONTINUED | OUTPATIENT
Start: 2024-01-10 | End: 2024-01-10 | Stop reason: SDUPTHER

## 2024-01-10 RX ADMIN — FENTANYL CITRATE 25 MCG: 50 INJECTION, SOLUTION INTRAMUSCULAR; INTRAVENOUS at 17:30

## 2024-01-10 RX ADMIN — CEFAZOLIN SODIUM 2 G: 1 INJECTION, POWDER, FOR SOLUTION INTRAMUSCULAR; INTRAVENOUS at 17:32

## 2024-01-10 RX ADMIN — GABAPENTIN 100 MG: 100 CAPSULE ORAL at 22:12

## 2024-01-10 RX ADMIN — LIDOCAINE HYDROCHLORIDE 60 MG: 20 INJECTION, SOLUTION EPIDURAL; INFILTRATION; INTRACAUDAL; PERINEURAL at 17:26

## 2024-01-10 RX ADMIN — ROCURONIUM BROMIDE 30 MG: 50 INJECTION, SOLUTION INTRAVENOUS at 17:27

## 2024-01-10 RX ADMIN — MORPHINE SULFATE 2 MG: 2 INJECTION, SOLUTION INTRAMUSCULAR; INTRAVENOUS at 11:13

## 2024-01-10 RX ADMIN — PROPOFOL 100 MG: 10 INJECTION, EMULSION INTRAVENOUS at 17:26

## 2024-01-10 RX ADMIN — HYDROCODONE BITARTRATE AND ACETAMINOPHEN 1 TABLET: 5; 325 TABLET ORAL at 19:32

## 2024-01-10 RX ADMIN — TUBERCULIN PURIFIED PROTEIN DERIVATIVE 5 UNITS: 5 INJECTION, SOLUTION INTRADERMAL at 22:13

## 2024-01-10 RX ADMIN — SODIUM CHLORIDE, PRESERVATIVE FREE 5 ML: 5 INJECTION INTRAVENOUS at 22:13

## 2024-01-10 RX ADMIN — SODIUM CHLORIDE 1000 ML: 9 INJECTION, SOLUTION INTRAVENOUS at 00:05

## 2024-01-10 RX ADMIN — Medication 3 MG: at 18:44

## 2024-01-10 RX ADMIN — ONDANSETRON 4 MG: 2 INJECTION INTRAMUSCULAR; INTRAVENOUS at 18:09

## 2024-01-10 RX ADMIN — CLINDAMYCIN PHOSPHATE 600 MG: 600 INJECTION, SOLUTION INTRAVENOUS at 08:29

## 2024-01-10 RX ADMIN — LIDOCAINE HYDROCHLORIDE 20 MG: 20 INJECTION, SOLUTION EPIDURAL; INFILTRATION; INTRACAUDAL; PERINEURAL at 18:10

## 2024-01-10 RX ADMIN — PRAVASTATIN SODIUM 40 MG: 20 TABLET ORAL at 01:43

## 2024-01-10 RX ADMIN — MORPHINE SULFATE 2 MG: 2 INJECTION, SOLUTION INTRAMUSCULAR; INTRAVENOUS at 07:00

## 2024-01-10 RX ADMIN — DEXAMETHASONE SODIUM PHOSPHATE 4 MG: 4 INJECTION INTRA-ARTICULAR; INTRALESIONAL; INTRAMUSCULAR; INTRAVENOUS; SOFT TISSUE at 18:09

## 2024-01-10 RX ADMIN — SODIUM CHLORIDE, POTASSIUM CHLORIDE, SODIUM LACTATE AND CALCIUM CHLORIDE: 600; 310; 30; 20 INJECTION, SOLUTION INTRAVENOUS at 16:18

## 2024-01-10 RX ADMIN — SODIUM CHLORIDE, SODIUM LACTATE, POTASSIUM CHLORIDE, AND CALCIUM CHLORIDE: 600; 310; 30; 20 INJECTION, SOLUTION INTRAVENOUS at 18:58

## 2024-01-10 RX ADMIN — LISINOPRIL 5 MG: 5 TABLET ORAL at 22:12

## 2024-01-10 RX ADMIN — FENTANYL CITRATE 25 MCG: 50 INJECTION, SOLUTION INTRAMUSCULAR; INTRAVENOUS at 19:03

## 2024-01-10 RX ADMIN — SODIUM CHLORIDE: 9 INJECTION, SOLUTION INTRAVENOUS at 22:20

## 2024-01-10 RX ADMIN — ASPIRIN 325 MG: 325 TABLET, COATED ORAL at 22:12

## 2024-01-10 RX ADMIN — WATER 1000 MG: 1 INJECTION INTRAMUSCULAR; INTRAVENOUS; SUBCUTANEOUS at 01:38

## 2024-01-10 RX ADMIN — PRAVASTATIN SODIUM 40 MG: 20 TABLET ORAL at 22:13

## 2024-01-10 RX ADMIN — Medication 10 MG: at 17:47

## 2024-01-10 RX ADMIN — FENTANYL CITRATE 25 MCG: 50 INJECTION, SOLUTION INTRAMUSCULAR; INTRAVENOUS at 18:49

## 2024-01-10 RX ADMIN — FENTANYL CITRATE 25 MCG: 50 INJECTION, SOLUTION INTRAMUSCULAR; INTRAVENOUS at 18:10

## 2024-01-10 RX ADMIN — HYDROMORPHONE HYDROCHLORIDE 0.25 MG: 2 INJECTION, SOLUTION INTRAMUSCULAR; INTRAVENOUS; SUBCUTANEOUS at 19:42

## 2024-01-10 RX ADMIN — SODIUM CHLORIDE, PRESERVATIVE FREE 10 ML: 5 INJECTION INTRAVENOUS at 08:26

## 2024-01-10 RX ADMIN — SODIUM CHLORIDE, SODIUM LACTATE, POTASSIUM CHLORIDE, AND CALCIUM CHLORIDE: 600; 310; 30; 20 INJECTION, SOLUTION INTRAVENOUS at 08:20

## 2024-01-10 RX ADMIN — ACETAMINOPHEN 1000 MG: 500 TABLET ORAL at 16:17

## 2024-01-10 RX ADMIN — HYDROMORPHONE HYDROCHLORIDE 0.25 MG: 2 INJECTION, SOLUTION INTRAMUSCULAR; INTRAVENOUS; SUBCUTANEOUS at 19:48

## 2024-01-10 RX ADMIN — MIDAZOLAM 2 MG: 1 INJECTION INTRAMUSCULAR; INTRAVENOUS at 17:21

## 2024-01-10 RX ADMIN — GLYCOPYRROLATE 0.4 MG: 0.2 INJECTION INTRAMUSCULAR; INTRAVENOUS at 18:44

## 2024-01-10 RX ADMIN — MORPHINE SULFATE 2 MG: 2 INJECTION, SOLUTION INTRAMUSCULAR; INTRAVENOUS at 01:43

## 2024-01-10 ASSESSMENT — PAIN SCALES - GENERAL
PAINLEVEL_OUTOF10: 4
PAINLEVEL_OUTOF10: 6
PAINLEVEL_OUTOF10: 9
PAINLEVEL_OUTOF10: 9
PAINLEVEL_OUTOF10: 0
PAINLEVEL_OUTOF10: 9
PAINLEVEL_OUTOF10: 9

## 2024-01-10 ASSESSMENT — PAIN DESCRIPTION - LOCATION
LOCATION: HIP
LOCATION: HIP
LOCATION: INCISION
LOCATION: HIP

## 2024-01-10 ASSESSMENT — PAIN DESCRIPTION - DESCRIPTORS
DESCRIPTORS: SHARP
DESCRIPTORS: ACHING
DESCRIPTORS: ACHING

## 2024-01-10 ASSESSMENT — PAIN DESCRIPTION - ORIENTATION
ORIENTATION: RIGHT
ORIENTATION: RIGHT

## 2024-01-10 ASSESSMENT — PAIN - FUNCTIONAL ASSESSMENT: PAIN_FUNCTIONAL_ASSESSMENT: 0-10

## 2024-01-10 NOTE — ACP (ADVANCE CARE PLANNING)
Advance Care Planning   Healthcare Decision Maker:    Secondary Decision Maker: Kezia Davis - Goldie - [phone]    Secondary Decision Maker: Price Vincent - [phone]    Click here to complete Healthcare Decision Makers including selection of the Healthcare Decision Maker Relationship (ie \"Primary\").

## 2024-01-10 NOTE — H&P
overlying the leg.     Acute traumatic fracture right femoral intertrochanteric level, 5 mm diastases and avulsion of the lesser trochanter.     XR HIP 2-3 VW W PELVIS RIGHT    Result Date: 1/9/2024  XR HIP 2-3 VW W PELVIS RIGHT - 1/9/2024 10:15 PM COMPARISON: None INDICATION: fall. Right pelvic, hip pain. Pelvis, right hip 3 views. FINDINGS: Acute traumatic fracture right femoral intertrochanteric level, 5 mm diastases and avulsion of the lesser trochanter. Associated soft tissue swelling. No dislocation seen.     Acute traumatic fracture right femoral intertrochanteric level, 5 mm diastases and avulsion of the lesser trochanter.     XR TIBIA FIBULA RIGHT (2 VIEWS)    Result Date: 1/9/2024  XR TIBIA FIBULA RIGHT (2 VIEWS) - 1/9/2024 10:15 PM COMPARISON: None INDICATION: fall. Right lower leg pain. Right tibia fibula 2 views.. FINDINGS: No acute fracture seen. No dislocation seen. No radiographic soft tissue abnormality seen. Total knee arthroplasty with no hardware loosening identified. Extensive external artifact overlying the leg.     No acute fracture seen.    XR FOOT RIGHT (MIN 3 VIEWS)    Result Date: 1/9/2024  XR FOOT RIGHT (MIN 3 VIEWS) - 1/9/2024 10:15 PM COMPARISON: None INDICATION: fall.  Right foot 3 views. FINDINGS: No acute fracture seen. No dislocation seen. Mild osteoarthritis greatest in the midfoot. Mild osteopenia.     No acute fracture seen.    XR CHEST 1 VIEW    Result Date: 1/9/2024  XR CHEST 1 VIEW - 1/9/2024 10:15 PM INDICATION: preop. COMPARISON:  None 1 view chest radiograph. FINDINGS: Mild bilateral pulmonary scar. Heart size appears normal. Spine degenerative changes present. Low lung volumes.     No acute pulmonary disease seen.    XR KNEE RIGHT (1-2 VIEWS)    Result Date: 1/9/2024  Right Knee INDICATION: Pain. COMPARISON: January 9, 2024 AP and lateral views of the right knee were obtained FINDINGS:  There is no evidence of fracture or other acute bony abnormality. No bony lesions are

## 2024-01-10 NOTE — CARE COORDINATION
Patient lives at home with spouse and primarily uses a cane to ambulate but does have a walker.  She lives at home with spouse, is independent and still drives.  She understands that at discharge their may be a recommendation for rehab and is provided a list of rehab facilities to consider.  She is reluctant to consider rehab feeling that she needs to be at home to care for spouse in ill health.  She has family nearby who is willing to help.      Patient confirms POA, PCP and insurance.    CM to follow clinical course for discharge planning needs and placement at rehab vs. Home Pt/OT services.    01/10/24 1047   Service Assessment   Patient Orientation Alert and Oriented   Cognition Alert   History Provided By Patient   Primary Caregiver Self   Accompanied By/Relationship ashish Vincent [phone]   Support Systems Spouse/Significant Other;Family Members   Patient's Healthcare Decision Maker is: Legal Next of Kin   PCP Verified by CM Yes   Last Visit to PCP Within last 3 months   Prior Functional Level Independent in ADLs/IADLs   Current Functional Level Assistance with the following:;Bathing;Dressing;Toileting;Cooking;Housework;Shopping;Mobility   Can patient return to prior living arrangement Unknown at present   Ability to make needs known: Good   Family able to assist with home care needs: Yes   Would you like for me to discuss the discharge plan with any other family members/significant others, and if so, who? No   Financial Resources Medicare   Community Resources None   CM/SW Referral Other (see comment)   Social/Functional History   Lives With Spouse   Type of Home House   Home Layout One level   Home Access Stairs to enter with rails   Entrance Stairs - Number of Steps 1   Bathroom Shower/Tub Tub/Shower unit   Bathroom Toilet Standard   Bathroom Equipment None  (son works construction and states he will install bathroom grab bars)   Bathroom Accessibility Accessible   Home Equipment Cane;Walker,

## 2024-01-10 NOTE — ACP (ADVANCE CARE PLANNING)
[facility] Hospitalist Service  At the heart of better care     Advance Care Planning   Admit Date:  2024  9:30 PM   Name:  Shreya Vincent   Age:  68 y.o.  Sex:  female  :  1955   MRN:  [id number]   Room:  [place] [address]    Shreya Vincent has capacity to make her own decisions:   Yes    If pt unable to make decisions, POA/surrogate decision maker:  sister    Other people present:   None    Patient / surrogate decision-maker directed code status:  Full Code    Other ACP topics discussed, if applicable:   Ortho, NPO,  IVF, PT/OT,  PPD    Patient or surrogate consented to discussion of the current conditions, workup, management plans, prognosis, and the risk for further deterioration.  Time spent: 30 minutes in direct discussion.      Signed:  DANIEL BELTRAN - SUKHI

## 2024-01-10 NOTE — ANESTHESIA PRE PROCEDURE
Department of Anesthesiology  Preprocedure Note       Name:  Shreya Vincent   Age:  68 y.o.  :  1955                                          MRN:  [id number]         Date:  1/10/2024      Surgeon: Surgeon(s):  Humberto Ruiz MD    Procedure: Procedure(s):  HIP PINNING    Medications prior to admission:   Prior to Admission medications    Medication Sig Start Date End Date Taking? Authorizing Provider   lisinopril-hydroCHLOROthiazide (PRINZIDE;ZESTORETIC) 20-12.5 MG per tablet Take 1 tablet by mouth daily 23   Moises Mendoza DO   metFORMIN (GLUCOPHAGE) 1000 MG tablet TAKE 1 TABLET BY MOUTH TWICE DAILY WITH MORNING MEAL AND WITH EVENING MEAL 23   Moises Mendoza DO   pravastatin (PRAVACHOL) 40 MG tablet Take 1 tablet by mouth daily 23   Moises Mendoza DO   hydrocortisone (ANUSOL-HC) 25 MG suppository Place 1 suppository rectally 2 times daily  Patient not taking: Reported on 2023   Laci Nj MD       Current medications:    Current Facility-Administered Medications   Medication Dose Route Frequency Provider Last Rate Last Admin   • insulin lispro (HUMALOG) injection vial 0-8 Units  0-8 Units SubCUTAneous TID WC Joseline Bueno APRN - CNP       • insulin lispro (HUMALOG) injection vial 0-4 Units  0-4 Units SubCUTAneous Nightly Joseline Bueno APRN - CNP       • glucose chewable tablet 16 g  4 tablet Oral PRN Joseline Bueno APRN - CNP       • dextrose bolus 10% 125 mL  125 mL IntraVENous PRN Joseline Bueno APRN - CNP        Or   • dextrose bolus 10% 250 mL  250 mL IntraVENous PRN Joseline Bueno APRN - CNP       • Glucagon Emergency KIT 1 mg  1 mg SubCUTAneous PRN Joseline Bueno APRN - CNP       • dextrose 10 % infusion   IntraVENous Continuous PRN Joseline Bueno APRN - CNP       • tuberculin injection 5 Units  5 Units IntraDERmal Once Joseline Bueno APRN - CNP       • morphine injection 2 mg  2 mg IntraVENous Q4H PRN Joseline Bueno APRN - CNP   2 mg at 01/10/24

## 2024-01-10 NOTE — ED TRIAGE NOTES
Patient arrives via EMS from home after a fall outside. Patient was trying to help dog out of pool and fell backwards onto right hip. Patient complaining of right hip pain that radiates to the ankle. Hx of knee replacement on the right side. Received 100 mcg of fentanyl en route. Patient unable to move leg but can move toes on right side. Pain 9/10. AxO x4.

## 2024-01-10 NOTE — ED NOTES
Provider at bedside. No complaints at this time, resting in bed.     Cindy López, RN  01/10/24 0819

## 2024-01-10 NOTE — INTERVAL H&P NOTE
Update History & Physical    The Patient's History and Physical of 1/10/2024 was reviewed with the patient and I examined the patient.  There was no change.  The surgical site was confirmed by the patient and me.    Plan:  The risk, benefits, expected outcome, and alternative to the recommended procedure have been discussed with the patient.  Patient understands and wants to proceed with open treatment of right proximal femur fracture with intramedullary nail fixation.    Electronically signed by Jordan Gregorio MD on 1/10/2024 at 6:57 AM

## 2024-01-10 NOTE — ED PROVIDER NOTES
Emergency Department Provider Note       PCP: Moises Mendoza DO   Age: 68 y.o.   Sex: female     DISPOSITION Decision To Admit 01/09/2024 11:19:45 PM       ICD-10-CM    1. Closed fracture of right hip, initial encounter ([facility])  S72.001A           Medical Decision Making     Complexity of Problems Addressed:  1 or more acute illnesses that pose a threat to life or bodily function.     Data Reviewed and Analyzed:   I independently ordered and reviewed each unique test.  I reviewed external records: provider visit note from PCP.       I independently ordered and interpreted the ED EKG in the absence of a Cardiologist.    Rate: 83  EKG Interpretation: EKG Interpretation: sinus rhythm, no evidence of arrhythmia and no acute changes  ST Segments: Nonspecific ST segments - NO STEMI      I interpreted the X-rays right hip + intertrochanteric fracture.    Discussion of management or test interpretation.    Patient is a 68-year-old female with a history of right knee replacement several years ago who presents status post mechanical fall backwards onto her right hip complaining of right hip pain and right knee pain and right entire leg pain.  Patient denies any head trauma denies any headache or neck pain.  Patient has a history of DM2, HTN, HLD, DDD.  Patient states she was attempting to get her dog who fell into the swimming pool out and she stepped and fell backwards.    Differential diagnosis includes was not limited to right hip fracture, right hip contusion, right knee fracture right knee contusion.    Patient's physical exam is remarkable for    Musculoskeletal:         General: Tenderness (Mild diffuse tenderness entire right lower extremity including right hip and right knee.  No deformity.  Neurovascular tact distally.)     My independent interpretation of patient's right hip x-rays positive for intertrochanteric fracture.    We will admit the right hip fracture.  All questions answered.      The patient was

## 2024-01-10 NOTE — CONSULTS
Consult    Patient: Shreya Vincent MRN: [id number]  SSN: [id number]    YOB: 1955  Age: 68 y.o.  Sex: female      Subjective:      Shreya Vincent is a 68 y.o. female  .  Seen in the emergency room with intertrochanteric fracture of the right femur sustained late last night while walking her dog.  Sadly the dog apparently fell in the pool and drowned and patient tried to get the dog out but instead fell on the pool deck resulting in this injury.  She had knee replacement done by Dr. Gallardo several years ago.  She went home after that.  Patient's daughter is at the bedside.    Patient was seen by Dr. Gregorio this morning but he has asked me to do her surgery.  Therefore this evaluation.    Past Medical History:   Diagnosis Date    DDD (degenerative disc disease), lumbar 7/17/2023    Diabetes (HCC)     Last A1C 6.0 in 10/2018, , No low's, Takes PO    GERD (gastroesophageal reflux disease)     Hx of cervical spine surgery 7/17/2023    Hypercholesterolemia     Hypertension     Idiopathic chronic gout of left knee without tophus 5/23/2018    Lumbar foraminal stenosis 7/17/2023    Mixed hyperlipidemia 5/11/2015    Seizures ([facility])     was told as a kid, passing out. None as an adult    Status post total right knee replacement 7/17/2023    Type 2 diabetes mellitus with stage 3a chronic kidney disease, without long-term current use of insulin ([facility]) 5/11/2015    Type 2 diabetes mellitus without complication, without long-term current use of insulin ([facility]) 5/11/2015     Past Surgical History:   Procedure Laterality Date    OTHER SURGICAL HISTORY      abcess in rectum, Dr. Kelley    TOTAL KNEE ARTHROPLASTY Right     knee    TUBAL LIGATION      tubal      FAMHX -No history of inflammatory arthritis   Social History     Tobacco Use    Smoking status: Never    Smokeless tobacco: Never   Substance Use Topics    Alcohol use: Yes     Alcohol/week: 1.0 standard drink of alcohol      Current 
Vomiting    Oxycodone Nausea And Vomiting     Patient states no reaction to this medication on 1/09/24    Sulfamethoxazole-Trimethoprim Nausea And Vomiting       Review of Systems:  A comprehensive review of systems was negative.    Objective:     Vitals:    01/10/24 0556 01/10/24 0611 01/10/24 0626 01/10/24 0654   BP: (!) 129/57 (!) 120/57 (!) 118/58 124/87   Pulse: 69 68 71 75   Resp: 15 15 15 22   Temp:       TempSrc:       SpO2: 95% 95% 96% 94%   Weight:       Height:            Physical Exam:  Physical Exam:  General:  Alert, cooperative, no distress, appears stated age. Orientation she is alert and oriented person place time and situation   Eyes:  Conjunctivae/corneas clear. PERRL, EOMs intact. Fundi benign   Ears:  Normal TMs and external ear canals both ears.   Nose: Nares normal. Septum midline. Mucosa normal. No drainage or sinus tenderness.   Mouth/Throat: Lips, mucosa, and tongue normal. Teeth and gums normal.   Neck: Supple, symmetrical, trachea midline, no adenopathy, thyroid: no enlargment/tenderness/nodules, no carotid bruit and no JVD.   Back:   Symmetric, no curvature. ROM normal. No CVA tenderness.   Lungs:   Clear to auscultation bilaterally.   Heart:  Regular rate and rhythm, S1, S2 normal, no murmur, click, rub or gallop.   Abdomen:   Soft, non-tender. Bowel sounds normal. No masses,  No organomegaly.       No lymphadenopathy in all 4 extremities  Alignment- pt has some obvious deformity of the right hip  Range of motion- with any range of motion right hip  Vascular-distal pulses palpable in right lower extremity  Sensory/motor-deep tendon reflexes normal right lower extremity.  Motor and sensory function intact.  Stability- is difficult to assess stability because of the presence of the fracture  Tenderness to palpation over the right greater trochanter area  Skin- no rashes ulcerations or open wounds right lower extremity  Gait-pt cannot put any weight on the right lower

## 2024-01-11 LAB
ANION GAP SERPL CALC-SCNC: 3 MMOL/L (ref 2–11)
BASOPHILS # BLD: 0 K/UL (ref 0–0.2)
BASOPHILS NFR BLD: 0 % (ref 0–2)
BUN SERPL-MCNC: 10 MG/DL (ref 8–23)
CALCIUM SERPL-MCNC: 7.7 MG/DL (ref 8.3–10.4)
CHLORIDE SERPL-SCNC: 111 MMOL/L (ref 103–113)
CO2 SERPL-SCNC: 25 MMOL/L (ref 21–32)
CREAT SERPL-MCNC: 0.9 MG/DL (ref 0.6–1)
DIFFERENTIAL METHOD BLD: ABNORMAL
EOSINOPHIL # BLD: 0 K/UL (ref 0–0.8)
EOSINOPHIL NFR BLD: 0 % (ref 0.5–7.8)
ERYTHROCYTE [DISTWIDTH] IN BLOOD BY AUTOMATED COUNT: 13.2 % (ref 11.9–14.6)
GLUCOSE BLD STRIP.AUTO-MCNC: 125 MG/DL (ref 65–100)
GLUCOSE BLD STRIP.AUTO-MCNC: 147 MG/DL (ref 65–100)
GLUCOSE BLD STRIP.AUTO-MCNC: 182 MG/DL (ref 65–100)
GLUCOSE BLD STRIP.AUTO-MCNC: 199 MG/DL (ref 65–100)
GLUCOSE SERPL-MCNC: 150 MG/DL (ref 65–100)
HCT VFR BLD AUTO: 28.3 % (ref 35.8–46.3)
HGB BLD-MCNC: 9.1 G/DL (ref 11.7–15.4)
IMM GRANULOCYTES # BLD AUTO: 0.1 K/UL (ref 0–0.5)
IMM GRANULOCYTES NFR BLD AUTO: 1 % (ref 0–5)
LYMPHOCYTES # BLD: 1 K/UL (ref 0.5–4.6)
LYMPHOCYTES NFR BLD: 9 % (ref 13–44)
MCH RBC QN AUTO: 28.3 PG (ref 26.1–32.9)
MCHC RBC AUTO-ENTMCNC: 32.2 G/DL (ref 31.4–35)
MCV RBC AUTO: 88.2 FL (ref 82–102)
MM INDURATION, POC: 0 MM (ref 0–5)
MONOCYTES # BLD: 0.9 K/UL (ref 0.1–1.3)
MONOCYTES NFR BLD: 9 % (ref 4–12)
NEUTS SEG # BLD: 8.4 K/UL (ref 1.7–8.2)
NEUTS SEG NFR BLD: 81 % (ref 43–78)
NRBC # BLD: 0 K/UL (ref 0–0.2)
PLATELET # BLD AUTO: 141 K/UL (ref 150–450)
PMV BLD AUTO: 10.2 FL (ref 9.4–12.3)
POTASSIUM SERPL-SCNC: 4.4 MMOL/L (ref 3.5–5.1)
PPD, POC: NEGATIVE
RBC # BLD AUTO: 3.21 M/UL (ref 4.05–5.2)
SERVICE CMNT-IMP: ABNORMAL
SODIUM SERPL-SCNC: 139 MMOL/L (ref 136–146)
WBC # BLD AUTO: 10.4 K/UL (ref 4.3–11.1)

## 2024-01-11 PROCEDURE — 80048 BASIC METABOLIC PNL TOTAL CA: CPT

## 2024-01-11 PROCEDURE — 2580000003 HC RX 258: Performed by: ORTHOPAEDIC SURGERY

## 2024-01-11 PROCEDURE — 97535 SELF CARE MNGMENT TRAINING: CPT

## 2024-01-11 PROCEDURE — 85025 COMPLETE CBC W/AUTO DIFF WBC: CPT

## 2024-01-11 PROCEDURE — 82962 GLUCOSE BLOOD TEST: CPT

## 2024-01-11 PROCEDURE — 36415 COLL VENOUS BLD VENIPUNCTURE: CPT

## 2024-01-11 PROCEDURE — 6370000000 HC RX 637 (ALT 250 FOR IP): Performed by: NURSE PRACTITIONER

## 2024-01-11 PROCEDURE — 97530 THERAPEUTIC ACTIVITIES: CPT

## 2024-01-11 PROCEDURE — 1100000000 HC RM PRIVATE

## 2024-01-11 PROCEDURE — 97161 PT EVAL LOW COMPLEX 20 MIN: CPT

## 2024-01-11 PROCEDURE — 6370000000 HC RX 637 (ALT 250 FOR IP): Performed by: ORTHOPAEDIC SURGERY

## 2024-01-11 PROCEDURE — 97165 OT EVAL LOW COMPLEX 30 MIN: CPT

## 2024-01-11 PROCEDURE — 97112 NEUROMUSCULAR REEDUCATION: CPT

## 2024-01-11 PROCEDURE — 6360000002 HC RX W HCPCS: Performed by: ORTHOPAEDIC SURGERY

## 2024-01-11 RX ADMIN — CEFAZOLIN SODIUM 2000 MG: 100 INJECTION, POWDER, LYOPHILIZED, FOR SOLUTION INTRAVENOUS at 08:24

## 2024-01-11 RX ADMIN — SODIUM CHLORIDE, SODIUM LACTATE, POTASSIUM CHLORIDE, AND CALCIUM CHLORIDE: 600; 310; 30; 20 INJECTION, SOLUTION INTRAVENOUS at 18:32

## 2024-01-11 RX ADMIN — FAMOTIDINE 20 MG: 20 TABLET, FILM COATED ORAL at 08:22

## 2024-01-11 RX ADMIN — OXYCODONE HYDROCHLORIDE 10 MG: 5 TABLET ORAL at 13:57

## 2024-01-11 RX ADMIN — GABAPENTIN 100 MG: 100 CAPSULE ORAL at 20:53

## 2024-01-11 RX ADMIN — ASPIRIN 325 MG: 325 TABLET, COATED ORAL at 08:22

## 2024-01-11 RX ADMIN — CEFAZOLIN SODIUM 2000 MG: 100 INJECTION, POWDER, LYOPHILIZED, FOR SOLUTION INTRAVENOUS at 01:37

## 2024-01-11 RX ADMIN — GABAPENTIN 100 MG: 100 CAPSULE ORAL at 13:58

## 2024-01-11 RX ADMIN — GABAPENTIN 100 MG: 100 CAPSULE ORAL at 08:22

## 2024-01-11 RX ADMIN — SODIUM CHLORIDE, PRESERVATIVE FREE 5 ML: 5 INJECTION INTRAVENOUS at 08:24

## 2024-01-11 RX ADMIN — OXYCODONE HYDROCHLORIDE 10 MG: 5 TABLET ORAL at 18:26

## 2024-01-11 RX ADMIN — OXYCODONE HYDROCHLORIDE 10 MG: 5 TABLET ORAL at 05:27

## 2024-01-11 RX ADMIN — PRAVASTATIN SODIUM 40 MG: 20 TABLET ORAL at 20:53

## 2024-01-11 RX ADMIN — SODIUM CHLORIDE: 9 INJECTION, SOLUTION INTRAVENOUS at 08:29

## 2024-01-11 ASSESSMENT — PAIN SCALES - GENERAL
PAINLEVEL_OUTOF10: 9
PAINLEVEL_OUTOF10: 10
PAINLEVEL_OUTOF10: 0
PAINLEVEL_OUTOF10: 8

## 2024-01-11 ASSESSMENT — PAIN - FUNCTIONAL ASSESSMENT
PAIN_FUNCTIONAL_ASSESSMENT: ACTIVITIES ARE NOT PREVENTED
PAIN_FUNCTIONAL_ASSESSMENT: PREVENTS OR INTERFERES SOME ACTIVE ACTIVITIES AND ADLS
PAIN_FUNCTIONAL_ASSESSMENT: PREVENTS OR INTERFERES SOME ACTIVE ACTIVITIES AND ADLS

## 2024-01-11 ASSESSMENT — PAIN DESCRIPTION - FREQUENCY: FREQUENCY: INTERMITTENT

## 2024-01-11 ASSESSMENT — PAIN DESCRIPTION - DESCRIPTORS
DESCRIPTORS: ACHING;DISCOMFORT
DESCRIPTORS: ACHING;POUNDING;DISCOMFORT
DESCRIPTORS: ACHING

## 2024-01-11 ASSESSMENT — PAIN DESCRIPTION - ONSET: ONSET: GRADUAL

## 2024-01-11 ASSESSMENT — PAIN DESCRIPTION - PAIN TYPE: TYPE: SURGICAL PAIN

## 2024-01-11 ASSESSMENT — PAIN DESCRIPTION - ORIENTATION: ORIENTATION: RIGHT

## 2024-01-11 ASSESSMENT — PAIN DESCRIPTION - LOCATION
LOCATION: HIP

## 2024-01-11 NOTE — ANESTHESIA POSTPROCEDURE EVALUATION
Department of Anesthesiology  Postprocedure Note    Patient: Shreya Vincent  MRN: [id number]  YOB: 1955  Date of evaluation: 1/10/2024    Procedure Summary     Date: 01/10/24 Room / Location: [facility] MAIN OR  / [facility] MAIN OR    Anesthesia Start: 1721 Anesthesia Stop: 1912    Procedure: ORIF of Right Proximal Femur Fracture with Intermedullary Nail Fixation (Right: Leg Upper) Diagnosis:       Closed displaced intertrochanteric fracture of right femur, initial encounter ([facility])      (Closed displaced intertrochanteric fracture of right femur, initial encounter ([facility]) [S72.141A])    Providers: Humberto Ruiz MD Responsible Provider: Earnestine Liz MD    Anesthesia Type: General ASA Status: 3 - Emergent          Anesthesia Type: General    Lucia Phase I: Lucia Score: 10    Lucia Phase II:      Anesthesia Post Evaluation    Patient location during evaluation: PACU  Patient participation: complete - patient participated  Level of consciousness: awake and alert  Airway patency: patent  Nausea: well controlled.  Cardiovascular status: acceptable.  Respiratory status: acceptable  Hydration status: stable  Pain management: adequate        No notable events documented.

## 2024-01-11 NOTE — OP NOTE
Operative Note      Patient: Shreya Vincent  YOB: 1955  MRN: [id number]    Date of Procedure: 1/10/2024    Pre-Op Diagnosis Codes:     * Closed displaced intertrochanteric fracture of right femur, initial encounter ([facility]) [S72.141A]    Post-Op Diagnosis: Same       Procedure(s):  ORIF of Right Proximal Femur Fracture with Intermedullary Nail Fixation    Surgeon(s):  Humberto Ruiz MD    Assistant:   * No surgical staff found *    Anesthesia: General    Estimated Blood Loss (mL): 200     Complications: None    Specimens:   * No specimens in log *    Implants:  Implant Name Type Inv. Item Serial No.  Lot No. LRB No. Used Action   SCREW LAG GAMMA [id number] - [id number]  SCREW LAG GAMMA [id number]  [employer] Orthogem [id number] Right 1 Implanted   [place] Gamma 4 Trochanteric Nail 170mm     [id number] Right 1 Implanted   SCREW LK 5.0X37MM - [id number]  SCREW LK 5.0X37MM  [employer] Orthogem [id number] Right 1 Implanted         Drains:   Urinary Catheter 01/10/24 (Active)       Findings: Fracture        Detailed Description of Procedure:   Patient brought to the operating room after induction anesthesia placed on the [place] table with the right lower extremity was placed in traction internal rotation and adduction.  Fluoroscopic imaging revealed good position and alignment of the fracture.  The leg was placed on a padded cradle and kept out of harm's way.  The right hip and right thigh were thoroughly prepped with alcohol and ChloraPrep and draped in routine fashion including adherent drapes.  Tip of the trochanter was located with fluoroscopic imaging and a 3 inch incision made to straddling the tip of the trochanter deepened through about 2 to 2-1/2 inches of fat down to fascia.  Fascia was incised followed by palpation the greater trochanter and placing an awl in appropriate position in AP and lateral fluoroscopic imaging and then the awl was advanced into the femoral

## 2024-01-11 NOTE — THERAPY EVALUATION
ACUTE PHYSICAL THERAPY GOALS:   (Developed with and agreed upon by patient and/or caregiver.)    (1.)Ms. Vincent will move from supine to sit and sit to supine  in bed with CONTACT GUARD ASSIST within 7 treatment day(s).    (2.)Ms. Vincent will transfer from bed to chair and chair to bed with CONTACT GUARD ASSIST using the least restrictive device within 7 treatment day(s).    (3.)Ms. Vinecnt will ambulate with CONTACT GUARD ASSIST for 150 feet with the least restrictive device within 7 treatment day(s).  ________________________________________________________________________________________________    PHYSICAL THERAPY Initial Assessment and AM  (Link to Caseload Tracking: PT Visit Days : 1  Acknowledge Orders  Time In/Out  PT Charge Capture  Rehab Caseload Tracker    Shreya Vincent is a 68 y.o. female   PRIMARY DIAGNOSIS: Closed right hip fracture (HCC)  Closed fracture of right hip, initial encounter ([facility]) [S72.001A]  Closed right hip fracture, initial encounter ([facility]) [S72.001A]  Procedure(s) (LRB):  ORIF of Right Proximal Femur Fracture with Intermedullary Nail Fixation (Right)  1 Day Post-Op  Reason for Referral: Pain in Right Hip (M25.551)  Stiffness of Right Hip, Not elsewhere classified (M25.651)  Difficulty in walking, Not elsewhere classified (R26.2)  Other abnormalities of gait and mobility (R26.89)  Inpatient: Payor: [employer] MEDICARE / Plan: [employer] GOLD PLUS HMO / Product Type: *No Product type* /     ASSESSMENT:     REHAB RECOMMENDATIONS:   Recommendation to date pending progress:  Setting:  Short-term Rehab    Equipment:    To Be Determined     ASSESSMENT:  Ms. Vincent s/p ORIF R proximal femur fx with IM nail POD#1.  Pt independent with all mobility and ADL's at baseline without use of assistive device.  Today pt performed bed mobility, transfers, and ambulation with use of rolling walker.  Pt with valgus deformity noted R knee, per pt since R knee replacement.  Pt required verbal

## 2024-01-11 NOTE — PLAN OF CARE
Problem: Chronic Conditions and Co-morbidities  Goal: Patient's chronic conditions and co-morbidity symptoms are monitored and maintained or improved  1/11/2024 1205 by Nazia Olson RN  Outcome: Progressing  1/10/2024 2324 by Letha Alexander RN  Outcome: Progressing     Problem: Pain  Goal: Verbalizes/displays adequate comfort level or baseline comfort level  1/11/2024 1205 by Nazia Olson RN  Outcome: Progressing  1/10/2024 2324 by Letha Alexander RN  Outcome: Progressing     Problem: Safety - Adult  Goal: Free from fall injury  1/11/2024 1205 by Nazia Olson RN  Outcome: Progressing  1/10/2024 2324 by Letha Alexander RN  Outcome: Progressing     Problem: Discharge Planning  Goal: Discharge to home or other facility with appropriate resources  1/11/2024 1205 by Nazia Olson RN  Outcome: Progressing  1/10/2024 2324 by Letha Alexander RN  Outcome: Progressing     Problem: ABCDS Injury Assessment  Goal: Absence of physical injury  1/11/2024 1205 by Nazia Olson RN  Outcome: Progressing  1/10/2024 2324 by Letha Alexander RN  Outcome: Progressing

## 2024-01-11 NOTE — PLAN OF CARE
Problem: Chronic Conditions and Co-morbidities  Goal: Patient's chronic conditions and co-morbidity symptoms are monitored and maintained or improved  Outcome: Progressing     Problem: Pain  Goal: Verbalizes/displays adequate comfort level or baseline comfort level  Outcome: Progressing     Problem: Safety - Adult  Goal: Free from fall injury  Outcome: Progressing     Problem: Discharge Planning  Goal: Discharge to home or other facility with appropriate resources  Outcome: Progressing     Problem: ABCDS Injury Assessment  Goal: Absence of physical injury  Outcome: Progressing

## 2024-01-12 PROBLEM — D62 POSTOPERATIVE ANEMIA DUE TO ACUTE BLOOD LOSS: Status: ACTIVE | Noted: 2024-01-12

## 2024-01-12 LAB
ANION GAP SERPL CALC-SCNC: 3 MMOL/L (ref 2–11)
APPEARANCE UR: CLEAR
BACTERIA SPEC CULT: NORMAL
BACTERIA URNS QL MICRO: 0 /HPF
BASOPHILS # BLD: 0 K/UL (ref 0–0.2)
BASOPHILS # BLD: 0 K/UL (ref 0–0.2)
BASOPHILS NFR BLD: 0 % (ref 0–2)
BASOPHILS NFR BLD: 0 % (ref 0–2)
BILIRUB UR QL: NEGATIVE
BUN SERPL-MCNC: 13 MG/DL (ref 8–23)
CALCIUM SERPL-MCNC: 7.8 MG/DL (ref 8.3–10.4)
CASTS URNS QL MICRO: 0 /LPF
CHLORIDE SERPL-SCNC: 111 MMOL/L (ref 103–113)
CO2 SERPL-SCNC: 27 MMOL/L (ref 21–32)
COLOR UR: ABNORMAL
CREAT SERPL-MCNC: 1 MG/DL (ref 0.6–1)
CRYSTALS URNS QL MICRO: 0 /LPF
DIFFERENTIAL METHOD BLD: ABNORMAL
DIFFERENTIAL METHOD BLD: ABNORMAL
EOSINOPHIL # BLD: 0.1 K/UL (ref 0–0.8)
EOSINOPHIL # BLD: 0.1 K/UL (ref 0–0.8)
EOSINOPHIL NFR BLD: 1 % (ref 0.5–7.8)
EOSINOPHIL NFR BLD: 1 % (ref 0.5–7.8)
EPI CELLS #/AREA URNS HPF: ABNORMAL /HPF
ERYTHROCYTE [DISTWIDTH] IN BLOOD BY AUTOMATED COUNT: 13.4 % (ref 11.9–14.6)
ERYTHROCYTE [DISTWIDTH] IN BLOOD BY AUTOMATED COUNT: 13.5 % (ref 11.9–14.6)
GLUCOSE BLD STRIP.AUTO-MCNC: 124 MG/DL (ref 65–100)
GLUCOSE BLD STRIP.AUTO-MCNC: 132 MG/DL (ref 65–100)
GLUCOSE BLD STRIP.AUTO-MCNC: 137 MG/DL (ref 65–100)
GLUCOSE BLD STRIP.AUTO-MCNC: 245 MG/DL (ref 65–100)
GLUCOSE SERPL-MCNC: 129 MG/DL (ref 65–100)
GLUCOSE UR STRIP.AUTO-MCNC: NEGATIVE MG/DL
HCT VFR BLD AUTO: 25.7 % (ref 35.8–46.3)
HCT VFR BLD AUTO: 25.8 % (ref 35.8–46.3)
HGB BLD-MCNC: 7.8 G/DL (ref 11.7–15.4)
HGB BLD-MCNC: 8.1 G/DL (ref 11.7–15.4)
HGB UR QL STRIP: NEGATIVE
IMM GRANULOCYTES # BLD AUTO: 0 K/UL (ref 0–0.5)
IMM GRANULOCYTES # BLD AUTO: 0 K/UL (ref 0–0.5)
IMM GRANULOCYTES NFR BLD AUTO: 0 % (ref 0–5)
IMM GRANULOCYTES NFR BLD AUTO: 0 % (ref 0–5)
KETONES UR QL STRIP.AUTO: NEGATIVE MG/DL
LEUKOCYTE ESTERASE UR QL STRIP.AUTO: ABNORMAL
LYMPHOCYTES # BLD: 2 K/UL (ref 0.5–4.6)
LYMPHOCYTES # BLD: 2.4 K/UL (ref 0.5–4.6)
LYMPHOCYTES NFR BLD: 19 % (ref 13–44)
LYMPHOCYTES NFR BLD: 27 % (ref 13–44)
MCH RBC QN AUTO: 28 PG (ref 26.1–32.9)
MCH RBC QN AUTO: 28.3 PG (ref 26.1–32.9)
MCHC RBC AUTO-ENTMCNC: 30.4 G/DL (ref 31.4–35)
MCHC RBC AUTO-ENTMCNC: 31.4 G/DL (ref 31.4–35)
MCV RBC AUTO: 90.2 FL (ref 82–102)
MCV RBC AUTO: 92.1 FL (ref 82–102)
MM INDURATION, POC: 0 MM (ref 0–5)
MONOCYTES # BLD: 1.1 K/UL (ref 0.1–1.3)
MONOCYTES # BLD: 1.1 K/UL (ref 0.1–1.3)
MONOCYTES NFR BLD: 11 % (ref 4–12)
MONOCYTES NFR BLD: 13 % (ref 4–12)
MUCOUS THREADS URNS QL MICRO: 0 /LPF
NEUTS SEG # BLD: 5 K/UL (ref 1.7–8.2)
NEUTS SEG # BLD: 7.2 K/UL (ref 1.7–8.2)
NEUTS SEG NFR BLD: 59 % (ref 43–78)
NEUTS SEG NFR BLD: 69 % (ref 43–78)
NITRITE UR QL STRIP.AUTO: NEGATIVE
NRBC # BLD: 0 K/UL (ref 0–0.2)
NRBC # BLD: 0 K/UL (ref 0–0.2)
OTHER OBSERVATIONS: ABNORMAL
PH UR STRIP: 5.5 (ref 5–9)
PLATELET # BLD AUTO: 131 K/UL (ref 150–450)
PLATELET # BLD AUTO: 132 K/UL (ref 150–450)
PMV BLD AUTO: 10.4 FL (ref 9.4–12.3)
PMV BLD AUTO: 10.6 FL (ref 9.4–12.3)
POTASSIUM SERPL-SCNC: 3.9 MMOL/L (ref 3.5–5.1)
PPD, POC: NEGATIVE
PROT UR STRIP-MCNC: NEGATIVE MG/DL
RBC # BLD AUTO: 2.79 M/UL (ref 4.05–5.2)
RBC # BLD AUTO: 2.86 M/UL (ref 4.05–5.2)
RBC #/AREA URNS HPF: ABNORMAL /HPF
SERVICE CMNT-IMP: ABNORMAL
SERVICE CMNT-IMP: NORMAL
SODIUM SERPL-SCNC: 141 MMOL/L (ref 136–146)
SP GR UR REFRACTOMETRY: 1.01 (ref 1–1.02)
URINE CULTURE IF INDICATED: ABNORMAL
UROBILINOGEN UR QL STRIP.AUTO: 0.2 EU/DL (ref 0.2–1)
WBC # BLD AUTO: 10.3 K/UL (ref 4.3–11.1)
WBC # BLD AUTO: 8.6 K/UL (ref 4.3–11.1)
WBC URNS QL MICRO: ABNORMAL /HPF

## 2024-01-12 PROCEDURE — 87086 URINE CULTURE/COLONY COUNT: CPT

## 2024-01-12 PROCEDURE — 97112 NEUROMUSCULAR REEDUCATION: CPT

## 2024-01-12 PROCEDURE — 82962 GLUCOSE BLOOD TEST: CPT

## 2024-01-12 PROCEDURE — 97535 SELF CARE MNGMENT TRAINING: CPT

## 2024-01-12 PROCEDURE — 1100000000 HC RM PRIVATE

## 2024-01-12 PROCEDURE — 6370000000 HC RX 637 (ALT 250 FOR IP): Performed by: ORTHOPAEDIC SURGERY

## 2024-01-12 PROCEDURE — 97530 THERAPEUTIC ACTIVITIES: CPT

## 2024-01-12 PROCEDURE — 80048 BASIC METABOLIC PNL TOTAL CA: CPT

## 2024-01-12 PROCEDURE — 36415 COLL VENOUS BLD VENIPUNCTURE: CPT

## 2024-01-12 PROCEDURE — 2580000003 HC RX 258: Performed by: ORTHOPAEDIC SURGERY

## 2024-01-12 PROCEDURE — 97110 THERAPEUTIC EXERCISES: CPT

## 2024-01-12 PROCEDURE — 81001 URINALYSIS AUTO W/SCOPE: CPT

## 2024-01-12 PROCEDURE — 85025 COMPLETE CBC W/AUTO DIFF WBC: CPT

## 2024-01-12 PROCEDURE — 6370000000 HC RX 637 (ALT 250 FOR IP): Performed by: NURSE PRACTITIONER

## 2024-01-12 RX ADMIN — GABAPENTIN 100 MG: 100 CAPSULE ORAL at 21:07

## 2024-01-12 RX ADMIN — PRAVASTATIN SODIUM 40 MG: 20 TABLET ORAL at 21:07

## 2024-01-12 RX ADMIN — INSULIN LISPRO 2 UNITS: 100 INJECTION, SOLUTION INTRAVENOUS; SUBCUTANEOUS at 17:06

## 2024-01-12 RX ADMIN — SODIUM CHLORIDE, PRESERVATIVE FREE 10 ML: 5 INJECTION INTRAVENOUS at 21:09

## 2024-01-12 RX ADMIN — SODIUM CHLORIDE, PRESERVATIVE FREE 5 ML: 5 INJECTION INTRAVENOUS at 08:56

## 2024-01-12 RX ADMIN — GABAPENTIN 100 MG: 100 CAPSULE ORAL at 08:55

## 2024-01-12 RX ADMIN — FAMOTIDINE 20 MG: 20 TABLET, FILM COATED ORAL at 08:55

## 2024-01-12 RX ADMIN — ASPIRIN 325 MG: 325 TABLET, COATED ORAL at 08:55

## 2024-01-12 RX ADMIN — GABAPENTIN 100 MG: 100 CAPSULE ORAL at 14:12

## 2024-01-12 RX ADMIN — OXYCODONE HYDROCHLORIDE 10 MG: 5 TABLET ORAL at 21:07

## 2024-01-12 ASSESSMENT — PAIN DESCRIPTION - LOCATION: LOCATION: LEG;HIP

## 2024-01-12 ASSESSMENT — PAIN SCALES - GENERAL
PAINLEVEL_OUTOF10: 2
PAINLEVEL_OUTOF10: 9
PAINLEVEL_OUTOF10: 0

## 2024-01-12 ASSESSMENT — PAIN DESCRIPTION - PAIN TYPE: TYPE: SURGICAL PAIN

## 2024-01-12 ASSESSMENT — PAIN DESCRIPTION - DESCRIPTORS: DESCRIPTORS: STABBING

## 2024-01-12 ASSESSMENT — PAIN - FUNCTIONAL ASSESSMENT: PAIN_FUNCTIONAL_ASSESSMENT: PREVENTS OR INTERFERES SOME ACTIVE ACTIVITIES AND ADLS

## 2024-01-12 ASSESSMENT — PAIN DESCRIPTION - ONSET: ONSET: ON-GOING

## 2024-01-12 ASSESSMENT — PAIN DESCRIPTION - FREQUENCY: FREQUENCY: CONTINUOUS

## 2024-01-12 ASSESSMENT — PAIN DESCRIPTION - ORIENTATION: ORIENTATION: RIGHT

## 2024-01-12 NOTE — CARE COORDINATION
CM met with patient and friend at bedside to review therapy recommendations and discharge planning.  Therapy recommendations for STR at SNF reviewed, however, patient politely declines and would like to return home with home health services.  CM reviewed importance of safety and assistance in the home environment and patient will have someone present 24 hours a day to assist.  Patient has all necessary DME in home that is currently recommended.  List of in-network home health agencies reviewed and patient would like referral sent to [facility].  Order placed and referral sent with confirmation of availability.  Patient and friend would like patient transported home with medical transport if possible.  CM reviewed that medical necessity is necessary and that CM will report all appropriate medical diagnosis to transport agency.  Transport packet placed with patient's paper chart.  When discharge is placed and confirmation received from emergency contact, Kezia Davis, that someone will be at home to accept any staff can call and schedule with [facility] transport at [phone].         01/12/24 1230   Service Assessment   Patient Orientation Alert and Oriented   Cognition Alert   History Provided By Patient;Child/Family   Primary Caregiver Self   Accompanied By/Relationship Kezia Davis   Support Systems Family Members;Friends/Neighbors   Patient's Healthcare Decision Maker is: Legal Next of Kin   PCP Verified by CM Yes   Last Visit to PCP Within last 6 months   Prior Functional Level Independent in ADLs/IADLs   Current Functional Level Assistance with the following:;Bathing;Dressing;Toileting;Cooking;Housework;Shopping;Mobility   Can patient return to prior living arrangement Yes   Ability to make needs known: Good   Family able to assist with home care needs: Yes   Would you like for me to discuss the discharge plan with any other family members/significant others, and if so, who? Yes  (Kezia Davis and other sister)

## 2024-01-12 NOTE — DISCHARGE INSTRUCTIONS
[facility]      IF YOU HAVE ANY PROBLEMS ONCE YOU ARE AT HOME CALL THE FOLLOWING NUMBERS:   Main office number: [phone] ask for Katerina (medical assistant with Dr. Gregorio)  Office Address: [address] [place] [address]  [address] [place] [id number]      Patient Discharge Instructions    Shreya Szymanski [place] / [id number] : 1955    Admitted 2024           To be given to Skilled Nursing Home on Admission         Weight bearing status: As tolerated with walker and assistance    Activity  Continue Physical Therapy and Occupational Therapy   Fall precautions     Wound Care   Staples are to be left in place and removed in our office  and Dry dressing changes using sterile technique every other day or more frequently if needed     Diet  Resume regular or diabetic diet      Medications    Patient medications are listed on the medication reconciliation sheet.     Follow up   Follow up in our office in 2 weeks postop   All patients are to be transported via stretcher unless they are able to independently get out of a chair and stand without assistance.               Information obtained by :  I understand that if any problems occur once I am at home I am to contact my physician.    I understand and acknowledge receipt of the instructions indicated above.                                                                                                                                           Physician's or R.N.'s Signature                                                                  Date/Time                                                                                                                                              Patient or Representative Signature                                                          Date/Time

## 2024-01-12 NOTE — PLAN OF CARE
Problem: Chronic Conditions and Co-morbidities  Goal: Patient's chronic conditions and co-morbidity symptoms are monitored and maintained or improved  1/12/2024 0012 by Letha Alexander RN  Outcome: Progressing  1/11/2024 1205 by Nazia Olson RN  Outcome: Progressing     Problem: Pain  Goal: Verbalizes/displays adequate comfort level or baseline comfort level  1/12/2024 0012 by Letha Alexander RN  Outcome: Progressing  1/11/2024 1205 by Nazia Olson RN  Outcome: Progressing     Problem: Safety - Adult  Goal: Free from fall injury  1/12/2024 0012 by Letha Alexander RN  Outcome: Progressing  1/11/2024 1205 by Nazia Olson RN  Outcome: Progressing     Problem: Discharge Planning  Goal: Discharge to home or other facility with appropriate resources  1/12/2024 0012 by Letha Alexander RN  Outcome: Progressing  1/11/2024 1205 by Nazia Olson RN  Outcome: Progressing     Problem: ABCDS Injury Assessment  Goal: Absence of physical injury  1/12/2024 0012 by Letha Alexander RN  Outcome: Progressing  1/11/2024 1205 by Nazia Olson RN  Outcome: Progressing

## 2024-01-12 NOTE — CARE COORDINATION
CM met with patient at bedside to confirm final discharge plans.  White board in room updated and patient and her sister are agreeable to discharge plan.  Patient and sister requesting for assistance with hospital bed for home.  CM shared that message will be sent to attending provider and if he believes that patient meets required medicare criteria, an order will be placed.  As patient has [employer] plan, referral will have to be sent to [employer] [facility].   [employer] message sent to attending provider.  CM will plan to work on hospital bed request on Monday when [facility] reopens and follow up with patient if she is discharged over the weekend.

## 2024-01-13 LAB
ANION GAP SERPL CALC-SCNC: 2 MMOL/L (ref 2–11)
BASOPHILS # BLD: 0.1 K/UL (ref 0–0.2)
BASOPHILS NFR BLD: 1 % (ref 0–2)
BUN SERPL-MCNC: 10 MG/DL (ref 8–23)
CALCIUM SERPL-MCNC: 7.9 MG/DL (ref 8.3–10.4)
CHLORIDE SERPL-SCNC: 110 MMOL/L (ref 103–113)
CO2 SERPL-SCNC: 27 MMOL/L (ref 21–32)
CREAT SERPL-MCNC: 0.8 MG/DL (ref 0.6–1)
DIFFERENTIAL METHOD BLD: ABNORMAL
EOSINOPHIL # BLD: 0.1 K/UL (ref 0–0.8)
EOSINOPHIL NFR BLD: 1 % (ref 0.5–7.8)
ERYTHROCYTE [DISTWIDTH] IN BLOOD BY AUTOMATED COUNT: 13.6 % (ref 11.9–14.6)
GLUCOSE BLD STRIP.AUTO-MCNC: 134 MG/DL (ref 65–100)
GLUCOSE BLD STRIP.AUTO-MCNC: 161 MG/DL (ref 65–100)
GLUCOSE BLD STRIP.AUTO-MCNC: 195 MG/DL (ref 65–100)
GLUCOSE BLD STRIP.AUTO-MCNC: 215 MG/DL (ref 65–100)
GLUCOSE SERPL-MCNC: 134 MG/DL (ref 65–100)
HCT VFR BLD AUTO: 24.4 % (ref 35.8–46.3)
HGB BLD-MCNC: 7.6 G/DL (ref 11.7–15.4)
IMM GRANULOCYTES # BLD AUTO: 0.1 K/UL (ref 0–0.5)
IMM GRANULOCYTES NFR BLD AUTO: 1 % (ref 0–5)
LYMPHOCYTES # BLD: 2.5 K/UL (ref 0.5–4.6)
LYMPHOCYTES NFR BLD: 26 % (ref 13–44)
MCH RBC QN AUTO: 28 PG (ref 26.1–32.9)
MCHC RBC AUTO-ENTMCNC: 31.1 G/DL (ref 31.4–35)
MCV RBC AUTO: 90 FL (ref 82–102)
MONOCYTES # BLD: 1.2 K/UL (ref 0.1–1.3)
MONOCYTES NFR BLD: 13 % (ref 4–12)
NEUTS SEG # BLD: 5.6 K/UL (ref 1.7–8.2)
NEUTS SEG NFR BLD: 59 % (ref 43–78)
NRBC # BLD: 0 K/UL (ref 0–0.2)
PLATELET # BLD AUTO: 146 K/UL (ref 150–450)
PMV BLD AUTO: 10.1 FL (ref 9.4–12.3)
POTASSIUM SERPL-SCNC: 4 MMOL/L (ref 3.5–5.1)
RBC # BLD AUTO: 2.71 M/UL (ref 4.05–5.2)
SERVICE CMNT-IMP: ABNORMAL
SODIUM SERPL-SCNC: 139 MMOL/L (ref 136–146)
WBC # BLD AUTO: 9.4 K/UL (ref 4.3–11.1)

## 2024-01-13 PROCEDURE — 36415 COLL VENOUS BLD VENIPUNCTURE: CPT

## 2024-01-13 PROCEDURE — 97112 NEUROMUSCULAR REEDUCATION: CPT

## 2024-01-13 PROCEDURE — 2580000003 HC RX 258: Performed by: ORTHOPAEDIC SURGERY

## 2024-01-13 PROCEDURE — 82962 GLUCOSE BLOOD TEST: CPT

## 2024-01-13 PROCEDURE — 6370000000 HC RX 637 (ALT 250 FOR IP): Performed by: NURSE PRACTITIONER

## 2024-01-13 PROCEDURE — 80048 BASIC METABOLIC PNL TOTAL CA: CPT

## 2024-01-13 PROCEDURE — 1100000000 HC RM PRIVATE

## 2024-01-13 PROCEDURE — 6370000000 HC RX 637 (ALT 250 FOR IP): Performed by: INTERNAL MEDICINE

## 2024-01-13 PROCEDURE — 85025 COMPLETE CBC W/AUTO DIFF WBC: CPT

## 2024-01-13 PROCEDURE — 6370000000 HC RX 637 (ALT 250 FOR IP): Performed by: ORTHOPAEDIC SURGERY

## 2024-01-13 PROCEDURE — 97530 THERAPEUTIC ACTIVITIES: CPT

## 2024-01-13 RX ORDER — FLUCONAZOLE 100 MG/1
150 TABLET ORAL ONCE
Status: COMPLETED | OUTPATIENT
Start: 2024-01-13 | End: 2024-01-13

## 2024-01-13 RX ADMIN — SODIUM CHLORIDE, PRESERVATIVE FREE 10 ML: 5 INJECTION INTRAVENOUS at 08:50

## 2024-01-13 RX ADMIN — FAMOTIDINE 20 MG: 20 TABLET, FILM COATED ORAL at 08:50

## 2024-01-13 RX ADMIN — GABAPENTIN 100 MG: 100 CAPSULE ORAL at 08:50

## 2024-01-13 RX ADMIN — FLUCONAZOLE 150 MG: 100 TABLET ORAL at 15:44

## 2024-01-13 RX ADMIN — PRAVASTATIN SODIUM 40 MG: 20 TABLET ORAL at 20:47

## 2024-01-13 RX ADMIN — ACETAMINOPHEN 650 MG: 325 TABLET ORAL at 20:47

## 2024-01-13 RX ADMIN — GABAPENTIN 100 MG: 100 CAPSULE ORAL at 20:46

## 2024-01-13 RX ADMIN — GABAPENTIN 100 MG: 100 CAPSULE ORAL at 13:57

## 2024-01-13 RX ADMIN — SODIUM CHLORIDE, PRESERVATIVE FREE 10 ML: 5 INJECTION INTRAVENOUS at 20:46

## 2024-01-13 RX ADMIN — OXYCODONE HYDROCHLORIDE 10 MG: 5 TABLET ORAL at 11:48

## 2024-01-13 RX ADMIN — ASPIRIN 325 MG: 325 TABLET, COATED ORAL at 08:49

## 2024-01-13 ASSESSMENT — PAIN DESCRIPTION - PAIN TYPE
TYPE: SURGICAL PAIN

## 2024-01-13 ASSESSMENT — PAIN SCALES - GENERAL
PAINLEVEL_OUTOF10: 2
PAINLEVEL_OUTOF10: 0
PAINLEVEL_OUTOF10: 6
PAINLEVEL_OUTOF10: 3
PAINLEVEL_OUTOF10: 10

## 2024-01-13 ASSESSMENT — PAIN DESCRIPTION - LOCATION
LOCATION: LEG;HIP
LOCATION: HIP
LOCATION: LEG;HIP

## 2024-01-13 ASSESSMENT — PAIN DESCRIPTION - FREQUENCY
FREQUENCY: CONTINUOUS

## 2024-01-13 ASSESSMENT — PAIN DESCRIPTION - ORIENTATION
ORIENTATION: RIGHT

## 2024-01-13 ASSESSMENT — PAIN DESCRIPTION - DESCRIPTORS
DESCRIPTORS: ACHING
DESCRIPTORS: DISCOMFORT;STABBING
DESCRIPTORS: ACHING;DISCOMFORT;SHARP;SORE;SHOOTING

## 2024-01-13 ASSESSMENT — PAIN - FUNCTIONAL ASSESSMENT
PAIN_FUNCTIONAL_ASSESSMENT: PREVENTS OR INTERFERES SOME ACTIVE ACTIVITIES AND ADLS
PAIN_FUNCTIONAL_ASSESSMENT: ACTIVITIES ARE NOT PREVENTED

## 2024-01-13 ASSESSMENT — PAIN DESCRIPTION - ONSET
ONSET: ON-GOING
ONSET: ON-GOING

## 2024-01-13 NOTE — PLAN OF CARE
Problem: Chronic Conditions and Co-morbidities  Goal: Patient's chronic conditions and co-morbidity symptoms are monitored and maintained or improved  1/12/2024 2328 by Mattie Nelson RN  Outcome: Progressing  1/12/2024 1418 by Nazia Olson RN  Outcome: Progressing     Problem: Pain  Goal: Verbalizes/displays adequate comfort level or baseline comfort level  1/12/2024 2328 by Mattie Nelson RN  Outcome: Progressing  1/12/2024 1418 by Nazia Olson RN  Outcome: Progressing     Problem: Safety - Adult  Goal: Free from fall injury  1/12/2024 2328 by Mattie Nelson RN  Outcome: Progressing  1/12/2024 1418 by Nazia Olson RN  Outcome: Progressing     Problem: Discharge Planning  Goal: Discharge to home or other facility with appropriate resources  1/12/2024 2328 by Mattie Nelson RN  Outcome: Progressing  1/12/2024 1418 by Nazia Olson RN  Outcome: Progressing     Problem: ABCDS Injury Assessment  Goal: Absence of physical injury  1/12/2024 2328 by Mattie Nelson RN  Outcome: Progressing  1/12/2024 1418 by Nazia Olson RN  Outcome: Progressing

## 2024-01-14 ENCOUNTER — HOME HEALTH ADMISSION (OUTPATIENT)
Dept: HOME HEALTH SERVICES | Facility: HOME HEALTH | Age: 69
End: 2024-01-14
Payer: MEDICARE

## 2024-01-14 ENCOUNTER — APPOINTMENT (OUTPATIENT)
Dept: GENERAL RADIOLOGY | Age: 69
DRG: 481 | End: 2024-01-14
Payer: MEDICARE

## 2024-01-14 VITALS
TEMPERATURE: 98.4 F | HEIGHT: 64 IN | SYSTOLIC BLOOD PRESSURE: 127 MMHG | WEIGHT: 165 LBS | HEART RATE: 81 BPM | RESPIRATION RATE: 22 BRPM | BODY MASS INDEX: 28.17 KG/M2 | DIASTOLIC BLOOD PRESSURE: 46 MMHG | OXYGEN SATURATION: 97 %

## 2024-01-14 LAB
ANION GAP SERPL CALC-SCNC: 3 MMOL/L (ref 2–11)
BASOPHILS # BLD: 0.1 K/UL (ref 0–0.2)
BASOPHILS NFR BLD: 1 % (ref 0–2)
BUN SERPL-MCNC: 11 MG/DL (ref 8–23)
CALCIUM SERPL-MCNC: 8.3 MG/DL (ref 8.3–10.4)
CHLORIDE SERPL-SCNC: 107 MMOL/L (ref 103–113)
CO2 SERPL-SCNC: 28 MMOL/L (ref 21–32)
CREAT SERPL-MCNC: 0.8 MG/DL (ref 0.6–1)
DIFFERENTIAL METHOD BLD: ABNORMAL
EOSINOPHIL # BLD: 0.2 K/UL (ref 0–0.8)
EOSINOPHIL NFR BLD: 2 % (ref 0.5–7.8)
ERYTHROCYTE [DISTWIDTH] IN BLOOD BY AUTOMATED COUNT: 13.5 % (ref 11.9–14.6)
GLUCOSE BLD STRIP.AUTO-MCNC: 128 MG/DL (ref 65–100)
GLUCOSE BLD STRIP.AUTO-MCNC: 166 MG/DL (ref 65–100)
GLUCOSE BLD STRIP.AUTO-MCNC: 173 MG/DL (ref 65–100)
GLUCOSE SERPL-MCNC: 138 MG/DL (ref 65–100)
HCT VFR BLD AUTO: 24.9 % (ref 35.8–46.3)
HGB BLD-MCNC: 7.8 G/DL (ref 11.7–15.4)
IMM GRANULOCYTES # BLD AUTO: 0 K/UL (ref 0–0.5)
IMM GRANULOCYTES NFR BLD AUTO: 0 % (ref 0–5)
LYMPHOCYTES # BLD: 2.5 K/UL (ref 0.5–4.6)
LYMPHOCYTES NFR BLD: 31 % (ref 13–44)
MCH RBC QN AUTO: 28.1 PG (ref 26.1–32.9)
MCHC RBC AUTO-ENTMCNC: 31.3 G/DL (ref 31.4–35)
MCV RBC AUTO: 89.6 FL (ref 82–102)
MONOCYTES # BLD: 1 K/UL (ref 0.1–1.3)
MONOCYTES NFR BLD: 13 % (ref 4–12)
NEUTS SEG # BLD: 4.1 K/UL (ref 1.7–8.2)
NEUTS SEG NFR BLD: 53 % (ref 43–78)
NRBC # BLD: 0 K/UL (ref 0–0.2)
PLATELET # BLD AUTO: 153 K/UL (ref 150–450)
PLATELET COMMENT: ADEQUATE
PMV BLD AUTO: 10 FL (ref 9.4–12.3)
POTASSIUM SERPL-SCNC: 3.7 MMOL/L (ref 3.5–5.1)
RBC # BLD AUTO: 2.78 M/UL (ref 4.05–5.2)
RBC MORPH BLD: ABNORMAL
SARS-COV-2 RDRP RESP QL NAA+PROBE: NOT DETECTED
SERVICE CMNT-IMP: ABNORMAL
SODIUM SERPL-SCNC: 138 MMOL/L (ref 136–146)
SOURCE: NORMAL
WBC # BLD AUTO: 7.9 K/UL (ref 4.3–11.1)
WBC MORPH BLD: ABNORMAL

## 2024-01-14 PROCEDURE — 2580000003 HC RX 258: Performed by: ORTHOPAEDIC SURGERY

## 2024-01-14 PROCEDURE — 6370000000 HC RX 637 (ALT 250 FOR IP): Performed by: NURSE PRACTITIONER

## 2024-01-14 PROCEDURE — 85025 COMPLETE CBC W/AUTO DIFF WBC: CPT

## 2024-01-14 PROCEDURE — 97530 THERAPEUTIC ACTIVITIES: CPT

## 2024-01-14 PROCEDURE — 82962 GLUCOSE BLOOD TEST: CPT

## 2024-01-14 PROCEDURE — 6370000000 HC RX 637 (ALT 250 FOR IP): Performed by: INTERNAL MEDICINE

## 2024-01-14 PROCEDURE — 71045 X-RAY EXAM CHEST 1 VIEW: CPT

## 2024-01-14 PROCEDURE — 97110 THERAPEUTIC EXERCISES: CPT

## 2024-01-14 PROCEDURE — 87635 SARS-COV-2 COVID-19 AMP PRB: CPT

## 2024-01-14 PROCEDURE — 80048 BASIC METABOLIC PNL TOTAL CA: CPT

## 2024-01-14 PROCEDURE — 36415 COLL VENOUS BLD VENIPUNCTURE: CPT

## 2024-01-14 PROCEDURE — 6370000000 HC RX 637 (ALT 250 FOR IP): Performed by: ORTHOPAEDIC SURGERY

## 2024-01-14 RX ORDER — ASPIRIN 325 MG
325 TABLET, DELAYED RELEASE (ENTERIC COATED) ORAL DAILY
Qty: 30 TABLET | Refills: 0 | Status: SHIPPED | OUTPATIENT
Start: 2024-01-15

## 2024-01-14 RX ORDER — OXYCODONE HYDROCHLORIDE 10 MG/1
10 TABLET ORAL EVERY 4 HOURS PRN
Qty: 18 TABLET | Refills: 0 | Status: SHIPPED | OUTPATIENT
Start: 2024-01-14 | End: 2024-01-19 | Stop reason: SDUPTHER

## 2024-01-14 RX ORDER — FLUTICASONE PROPIONATE 50 MCG
1 SPRAY, SUSPENSION (ML) NASAL DAILY
Status: DISCONTINUED | OUTPATIENT
Start: 2024-01-14 | End: 2024-01-14 | Stop reason: HOSPADM

## 2024-01-14 RX ORDER — GABAPENTIN 100 MG/1
100 CAPSULE ORAL 3 TIMES DAILY
Qty: 90 CAPSULE | Refills: 0 | Status: SHIPPED | OUTPATIENT
Start: 2024-01-14 | End: 2024-02-13

## 2024-01-14 RX ORDER — LISINOPRIL 5 MG/1
5 TABLET ORAL NIGHTLY
Qty: 30 TABLET | Refills: 3 | Status: SHIPPED | OUTPATIENT
Start: 2024-01-14

## 2024-01-14 RX ORDER — GUAIFENESIN/DEXTROMETHORPHAN 100-10MG/5
10 SYRUP ORAL EVERY 4 HOURS PRN
Qty: 120 ML | Refills: 0 | COMMUNITY
Start: 2024-01-14 | End: 2024-01-24

## 2024-01-14 RX ORDER — FLUTICASONE PROPIONATE 50 MCG
1 SPRAY, SUSPENSION (ML) NASAL DAILY
Qty: 16 G | Refills: 0 | Status: SHIPPED | OUTPATIENT
Start: 2024-01-14

## 2024-01-14 RX ORDER — GUAIFENESIN/DEXTROMETHORPHAN 100-10MG/5
10 SYRUP ORAL EVERY 4 HOURS PRN
Status: DISCONTINUED | OUTPATIENT
Start: 2024-01-14 | End: 2024-01-14 | Stop reason: HOSPADM

## 2024-01-14 RX ADMIN — GABAPENTIN 100 MG: 100 CAPSULE ORAL at 13:49

## 2024-01-14 RX ADMIN — FLUTICASONE PROPIONATE 1 SPRAY: 50 SPRAY, METERED NASAL at 11:00

## 2024-01-14 RX ADMIN — ACETAMINOPHEN 650 MG: 325 TABLET ORAL at 09:28

## 2024-01-14 RX ADMIN — ASPIRIN 325 MG: 325 TABLET, COATED ORAL at 09:28

## 2024-01-14 RX ADMIN — GABAPENTIN 100 MG: 100 CAPSULE ORAL at 09:28

## 2024-01-14 RX ADMIN — SODIUM CHLORIDE, PRESERVATIVE FREE 10 ML: 5 INJECTION INTRAVENOUS at 08:00

## 2024-01-14 RX ADMIN — FAMOTIDINE 20 MG: 20 TABLET, FILM COATED ORAL at 09:28

## 2024-01-14 RX ADMIN — OXYCODONE HYDROCHLORIDE 10 MG: 5 TABLET ORAL at 16:11

## 2024-01-14 RX ADMIN — OXYCODONE HYDROCHLORIDE 10 MG: 5 TABLET ORAL at 09:28

## 2024-01-14 ASSESSMENT — PAIN SCALES - GENERAL
PAINLEVEL_OUTOF10: 10
PAINLEVEL_OUTOF10: 9

## 2024-01-14 ASSESSMENT — PAIN DESCRIPTION - ORIENTATION: ORIENTATION: RIGHT

## 2024-01-14 NOTE — PLAN OF CARE
Problem: Chronic Conditions and Co-morbidities  Goal: Patient's chronic conditions and co-morbidity symptoms are monitored and maintained or improved  Outcome: Progressing     Problem: Pain  Goal: Verbalizes/displays adequate comfort level or baseline comfort level  Outcome: Progressing     Problem: Safety - Adult  Goal: Free from fall injury  Outcome: Progressing     Problem: Discharge Planning  Goal: Discharge to home or other facility with appropriate resources  Outcome: Progressing     Problem: ABCDS Injury Assessment  Goal: Absence of physical injury  Outcome: Progressing     Problem: Skin/Tissue Integrity  Goal: Absence of new skin breakdown  Description: 1.  Monitor for areas of redness and/or skin breakdown  2.  Assess vascular access sites hourly  3.  Every 4-6 hours minimum:  Change oxygen saturation probe site  4.  Every 4-6 hours:  If on nasal continuous positive airway pressure, respiratory therapy assess nares and determine need for appliance change or resting period.  Outcome: Progressing

## 2024-01-14 NOTE — CARE COORDINATION
Pt is for discharge home today with family via [employer].  Referral was called/sent today by this CM to [facility] for follow up home care as ordered.  No additional CM orders received or supportive care needs expressed at this time.     01/14/24 1446   Service Assessment   Patient's Healthcare Decision Maker is: Legal Next of Kin   Social/Functional History   Lives With Spouse   Services At/After Discharge   Transition of Care Consult (CM Consult) Home Health;Discharge Planning;Transportation Assistance   Internal Home Health Yes   Services At/After Discharge Home Health;Transport    Resource Information Provided? No   Mode of Transport at Discharge BLS  ([facility])   Hospital Transport Time of Discharge 1630   Confirm Follow Up Transport Family   Condition of Participation: Discharge Planning   The Plan for Transition of Care is related to the following treatment goals: Patient will discharge home with home health services.   The Patient and/or Patient Representative was provided with a Choice of Provider? Patient;Patient Representative   Name of the Patient Representative who was provided with the Choice of Provider and agrees with the Discharge Plan?  Kezia Davis   The Patient and/Or Patient Representative agree with the Discharge Plan? Yes   Freedom of Choice list was provided with basic dialogue that supports the patient's individualized plan of care/goals, treatment preferences, and shares the quality data associated with the providers?  Yes

## 2024-01-14 NOTE — PROGRESS NOTES
Hospitalist Progress Note   Admit Date:  2024  9:30 PM   Name:  Shreya Vincent   Age:  68 y.o.  Sex:  female  :  1955   MRN:  [id number]   Room:  [place] [address]    Presenting/Chief Complaint: Fall     Reason(s) for Admission: Closed right hip fracture, initial encounter ([facility]) [S72.001A]     Hospital Course:   Mrs. Vincent is a nice 69 y/o WF with a h/o DM, HTN, HLD who was admitted to our service on 1/10 with a R hip fracture. She was at home and had a mechanical fall while trying to get her dog out of the pool. She fell onto her R side and had R hip pain. X-rays showed a R intertrochanteric fracture. Ortho consulted.    Subjective & 24hr Events:   In bed, comfortable, pain currently controlled. Family at bedside. Awaiting surgery.    Assessment & Plan:   # R intertrochanteric hip fracture   - S/p mechanical fall at home. Ortho consulted, awaiting IM nail fixation. Pain control, therapies when able, supportive care otherwise.    # Asymptomatic bacteriuria   - Stop abx. CBC tomorrow.    # HLD   - Statin    # HTN   - Lisinopril    # DM2   - Hold orals, con't SSI    Anticipated Discharge Arrangements: Pending.  PT/OT evals and PPD ordered?  Therapy and PPD  Diet:  Diet NPO  VTE prophylaxis:  None pre-op.  Code status: Full Code      Non-peripheral Lines and Tubes (if present):          Telemetry (if present):           Hospital Problems:  Principal Problem:    Closed right hip fracture ([facility])  Active Problems:    Mixed hyperlipidemia    Essential hypertension    Type 2 diabetes mellitus with stage 3a chronic kidney disease, without long-term current use of insulin ([facility])    Closed right hip fracture, initial encounter ([facility])    Leukocytosis    UTI (urinary tract infection)  Resolved Problems:    * No resolved hospital problems. *      Objective:   Patient Vitals for the past 24 hrs:   Temp Pulse Resp BP SpO2   01/10/24 1100 -- 76 17 129/61 95 %   01/10/24 1030 -- 73 12 124/65 96 %   01/10/24 0945 -- 
       Hospitalist Progress Note   Admit Date:  2024  9:30 PM   Name:  Shreya Vincent   Age:  68 y.o.  Sex:  female  :  1955   MRN:  [id number]   Room:  [facility]/    Presenting/Chief Complaint: Fall     Reason(s) for Admission: Closed fracture of right hip, initial encounter ([facility]) [S72.001A]  Closed right hip fracture, initial encounter ([facility]) [S72.001A]     Hospital Course:     Copied from prior provider summary/HPI:  Mrs. Vincent is a nice 69 y/o WF with a h/o DM, HTN, HLD who was admitted to our service on 1/10 with a R hip fracture. She was at home and had a mechanical fall while trying to get her dog out of the pool. She fell onto her R side and had R hip pain. X-rays showed a R intertrochanteric fracture. Ortho consulted.     Subjective & 24hr Events:   Postoperative day #1 ORIF nail fixation.  Vital signs are stable.  PT and OT have recommended short-term rehab placement.  Analgesia appears effective.  Hemoglobin dropped postop from 11.3 now 9.1-following.  CBC leukocytosis resolved.  No further antibiotics.  Suspected asymptomatic bacteriuria.  Sugars controlled with SSI.  On review of systems no chest pain, no dyspnea or orthopnea.     Assessment & Plan:   # R intertrochanteric hip fracture              - S/p mechanical fall at home. Ortho consulted, awaiting IM nail fixation. Pain control, therapies when able, supportive care otherwise.  --routine postoperative care-for short-term rehab discharge when bed available.     # Asymptomatic bacteriuria              - Stop abx. CBC tomorrow.  -     # HLD              - Statin -this is unchanged.     # HTN              - Lisinopril --controlled-monitor.     # DM2              - Hold orals, con't SSI     Anticipated Discharge Arrangements: Pending.  PT/OT evals and PPD ordered?  Therapy and PPD  Diet:  Diet NPO  VTE prophylaxis:  None pre-op.  Code status: Full Code          Non-peripheral Lines and Tubes (if present):      
       Hospitalist Progress Note   Admit Date:  2024  9:30 PM   Name:  Shreya Vincent   Age:  68 y.o.  Sex:  female  :  1955   MRN:  [id number]   Room:  [facility]/    Presenting/Chief Complaint: Fall     Reason(s) for Admission: Closed fracture of right hip, initial encounter ([facility]) [S72.001A]  Closed right hip fracture, initial encounter ([facility]) [S72.001A]     Hospital Course:     Copied from prior provider summary/HPI:  Mrs. Vincent is a nice 69 y/o WF with a h/o DM, HTN, HLD who was admitted to our service on 1/10 with a R hip fracture. She was at home and had a mechanical fall while trying to get her dog out of the pool. She fell onto her R side and had R hip pain. X-rays showed a R intertrochanteric fracture. Ortho consulted.     Subjective & 24hr Events:   Postoperative day #3 stabilization of hip fracture.  Complaining of persistent dysuria despite urinalysis without significant pyuria.  Going to give her empiric therapy for candidiasis.  She now wants to go home with home physical therapy.  Sister is present and claims that she will assist her.  She is sister her through prior knee surgery recovery and is aware that hip surgery recovery is more involved.  Patient continues to improve slowly.  Discussed probable discharge home in a.m. if laboratory and clinical status remained stable and no improved.  No reports of shaking chills fevers chest pain dyspnea nausea vomiting or diarrhea.         Assessment & Plan:   # R intertrochanteric hip fracture              - S/p mechanical fall at home. Ortho consulted, awaiting IM nail fixation. Pain control, therapies when able, supportive care otherwise.  -stat urine culture urinalysis and CBC.  Monitor closely for SIRS criteria-if pyuria empiric ceftriaxone pending urine culture result  --- persistent urinary symptoms despite negative urinalysis for pyuria.  Trial of Diflucan 150 mg p.o. x 1        # HLD              - Statin -this is 
    If you need to see a   -  just ask the nurse to call us.    We look forward to serving your family!!        Chaplain Alvares  
  Physician Progress Note      PATIENT:               ASHLY LOVE  CSN #:                  [id number]  :                       1955  ADMIT DATE:       2024 9:30 PM  DISCH DATE:  RESPONDING  PROVIDER #:        Rajendra Dewey DO          QUERY TEXT:    Pt admitted with R intertrochanteric hip fracture s/p fall at home . Pt noted   to have drop in hemoglobin.  If possible, please document in the progress   notes and discharge summary if you are evaluating and/or treating any of the   following:    The medical record reflects the following:  Risk Factors: hip fx s/p surgery  Clinical Indicators: Hgb 11.3 on admit with drop to 7.8.  Per OP report, EBL   200cc  Treatment: Lab monitoring  Options provided:  -- Postoperative acute blood loss anemia  -- [Precipitous drop in Hemoglobin and Hematocrit  -- Other - I will add my own diagnosis  -- Disagree - Not applicable / Not valid  -- Disagree - Clinically unable to determine / Unknown  -- Refer to Clinical Documentation Reviewer    PROVIDER RESPONSE TEXT:    This patient has postoperative acute blood loss anemia.    Query created by: PANCHITO MANN on 2024 12:29 PM      Electronically signed by:  Rajendra Dewey DO 2024 3:12 PM          
  [facility]        2024         Post Op day: 1 Day Post-Op Procedure(s) (LRB):  ORIF of Right Proximal Femur Fracture with Intermedullary Nail Fixation (Right)      Admit Date: 2024  Admit Diagnosis: Closed fracture of right hip, initial encounter ([facility]) [S72.001A]  Closed right hip fracture, initial encounter ([facility]) [S72.001A]       Principle Problem: Closed right hip fracture (HCC).           Subjective: Doing well, No complaints, No SOB, No Chest Pain, No Nausea or Vomiting     Objective:   Vital Signs are Stable, No Acute Distress, Alert,  Dressing is Dry,    Neurovascular check:Moves toes up and down     Assessment / Plan :  Patient Active Problem List   Diagnosis    Mixed hyperlipidemia    Essential hypertension    Type 2 diabetes mellitus with stage 3a chronic kidney disease, without long-term current use of insulin ([facility])    Cervical spondylosis with myelopathy    Idiopathic chronic gout of left knee without tophus    Vitamin D deficiency    Cervical spinal stenosis    Lumbar foraminal stenosis    DDD (degenerative disc disease), lumbar    Status post total right knee replacement    Hx of cervical spine surgery    Closed right hip fracture (HCC)    Closed right hip fracture, initial encounter ([facility])    Leukocytosis    UTI (urinary tract infection)    Patient Vitals for the past 8 hrs:   BP Temp Temp src Pulse Resp SpO2   24 0640 118/64 97.9 °F (36.6 °C) Axillary 93 18 98 %   24 0557 -- -- -- -- 18 --    Temp (24hrs), Av.5 °F (36.9 °C), Min:97.9 °F (36.6 °C), Max:98.9 °F (37.2 °C)    Body mass index is 28.32 kg/m².    Lab Results   Component Value Date/Time    HGB 9.1 2024 05:16 AM          S/P Procedure(s) (LRB):  ORIF of Right Proximal Femur Fracture with Intermedullary Nail Fixation (Right)        Medical Mgmt per hospitalist  Anticoagulation plan: Aspirin 325 mg daily for 30 days  Continue PT, WBAT   Dressing change:As needed only  Fall Precautions  DC disp: 
  [facility]        2024         Post Op day: 3 Days Post-Op Procedure(s) (LRB):  ORIF of Right Proximal Femur Fracture with Intermedullary Nail Fixation (Right)      Admit Date: 2024  Admit Diagnosis: Closed fracture of right hip, initial encounter ([facility]) [S72.001A]  Closed right hip fracture, initial encounter ([facility]) [S72.001A]       Principle Problem: Closed right hip fracture (HCC).           Subjective: Doing well, but right hip still painful, No SOB, No Chest Pain, No Nausea or Vomiting     Objective:   Vital Signs are Stable, No Acute Distress, Alert,  Dressing is Dry,    Neurovascular check:     Assessment / Plan :stable post op gamma nail right hip  Patient Active Problem List   Diagnosis    Mixed hyperlipidemia    Essential hypertension    Type 2 diabetes mellitus with stage 3a chronic kidney disease, without long-term current use of insulin ([facility])    Cervical spondylosis with myelopathy    Idiopathic chronic gout of left knee without tophus    Vitamin D deficiency    Cervical spinal stenosis    Lumbar foraminal stenosis    DDD (degenerative disc disease), lumbar    Status post total right knee replacement    Hx of cervical spine surgery    Closed right hip fracture (HCC)    Closed right hip fracture, initial encounter ([facility])    Leukocytosis    UTI (urinary tract infection)    Postoperative anemia due to acute blood loss    Patient Vitals for the past 8 hrs:   BP Temp Temp src Pulse Resp SpO2   24 0723 (!) 126/57 98.4 °F (36.9 °C) Oral 92 17 95 %    Temp (24hrs), Av.1 °F (37.3 °C), Min:98.4 °F (36.9 °C), Max:99.7 °F (37.6 °C)    Body mass index is 28.32 kg/m².    Lab Results   Component Value Date/Time    HGB 7.6 2024 04:54 AM          S/P Procedure(s) (LRB):  ORIF of Right Proximal Femur Fracture with Intermedullary Nail Fixation (Right)        Medical Mgmt per hospitalist  Anticoagulation plan: Aspirin 325 mg daily for 30 days  Continue PT, WBAT   Dressing change:As 
ACUTE OCCUPATIONAL THERAPY GOALS:   (Developed with and agreed upon by patient and/or caregiver.)  1. Patient will complete lower body bathing and dressing with CONTACT GUARD ASSIST and adaptive equipment as needed.     2. Patient will complete toileting with STAND BY ASSIST.  3. Patient will complete grooming ADL standing at sink with STAND BY ASSIST.  4. Patient will tolerate 25 minutes of OT treatment with 1-2 rest breaks to increase activity tolerance for ADLs.   5. Patient will complete functional transfers with STAND BY ASSIST and adaptive equipment as needed.   6. Patient will tolerate 10 minutes BUE exercises to increase strength for safe, functional transfers.      Timeframe: 7 visits    OCCUPATIONAL THERAPY: Daily Note AM   OT Visit Days: 3   Time In/Out  OT Charge Capture  Rehab Caseload Tracker  OT Orders    Shreya Vincent is a 68 y.o. female   PRIMARY DIAGNOSIS: Closed right hip fracture (HCC)  Closed fracture of right hip, initial encounter ([facility]) [S72.001A]  Closed right hip fracture, initial encounter ([facility]) [S72.001A]  Procedure(s) (LRB):  ORIF of Right Proximal Femur Fracture with Intermedullary Nail Fixation (Right)  3 Days Post-Op  Inpatient: Payor: [employer] MEDICARE / Plan: [employer] GOLD PLUS HMO / Product Type: *No Product type* /     ASSESSMENT:     REHAB RECOMMENDATIONS: CURRENT LEVEL OF FUNCTION:  (Most Recently Demonstrated)   Recommendation to date pending progress:  Setting:  Short-term Rehab    Equipment:    To Be Determined Bathing:  Not Tested  Dressing:  Not Tested  Feeding/Grooming:  Not Tested  Toileting:  Supervision/Setup  Functional Mobility:  Minimal Assist x2     ASSESSMENT:  Ms. Vincent presents in supine with her sister in the room assisting pt with bathing, hygiene, and dressing. Pt's sister was doing a lot of self care for the pt that pt obviously could have completed more independently. Today's session, we focused on increasing independence with all mobility and 
ACUTE OCCUPATIONAL THERAPY GOALS:   (Developed with and agreed upon by patient and/or caregiver.)  1. Patient will complete lower body bathing and dressing with CONTACT GUARD ASSIST and adaptive equipment as needed.     2. Patient will complete toileting with STAND BY ASSIST.  3. Patient will complete grooming ADL standing at sink with STAND BY ASSIST.  4. Patient will tolerate 25 minutes of OT treatment with 1-2 rest breaks to increase activity tolerance for ADLs.   5. Patient will complete functional transfers with STAND BY ASSIST and adaptive equipment as needed.   6. Patient will tolerate 10 minutes BUE exercises to increase strength for safe, functional transfers.     Timeframe: 7 visits     OCCUPATIONAL THERAPY Initial Assessment and Daily Note       OT Visit Days: 1  Acknowledge Orders  Time  OT Charge Capture  Rehab Caseload Tracker      WBAT RLROBBI    Shreya Vincent is a 68 y.o. female   PRIMARY DIAGNOSIS: Closed right hip fracture (HCC)  Closed fracture of right hip, initial encounter ([facility]) [S72.001A]  Closed right hip fracture, initial encounter ([facility]) [S72.001A]  Procedure(s) (LRB):  ORIF of Right Proximal Femur Fracture with Intermedullary Nail Fixation (Right)  1 Day Post-Op  Reason for Referral: Pain in Right Hip (M25.551)  Stiffness of Right Hip, Not elsewhere classified (M25.651)  Difficulty in walking, Not elsewhere classified (R26.2)  Other abnormalities of gait and mobility (R26.89)  History of falling (Z91.81)  Inpatient: Payor: [employer] MEDICARE / Plan: [employer] GOLD PLUS HMO / Product Type: *No Product type* /     ASSESSMENT:     REHAB RECOMMENDATIONS:   Recommendation to date pending progress:  Setting:  Short-term Rehab    Equipment:    To Be Determined--pt has RW, SC and rollator at home     ASSESSMENT:  Ms. Vincent is a 67 y/o female presents after a fall at home with R hip fracture. Pt is now s/p ORIF of R femur with IM nail and is WBAT RLE. At baseline pt lives with her , 
ACUTE PHYSICAL THERAPY GOALS:   (Developed with and agreed upon by patient and/or caregiver.)  (1.)Ms. Vincent will move from supine to sit and sit to supine  in bed with CONTACT GUARD ASSIST within 7 treatment day(s).    (2.)Ms. Vincent will transfer from bed to chair and chair to bed with CONTACT GUARD ASSIST using the least restrictive device within 7 treatment day(s).    (3.)Ms. Vincent will ambulate with CONTACT GUARD ASSIST for 150 feet with the least restrictive device within 7 treatment day(s).  _    PHYSICAL THERAPY: Daily Note AM   (Link to Caseload Tracking: PT Visit Days : 2  Time In/Out PT Charge Capture  Rehab Caseload Tracker  Orders    Shreya Vincent is a 68 y.o. female   PRIMARY DIAGNOSIS: Closed right hip fracture (HCC)  Closed fracture of right hip, initial encounter ([facility]) [S72.001A]  Closed right hip fracture, initial encounter ([facility]) [S72.001A]  Procedure(s) (LRB):  ORIF of Right Proximal Femur Fracture with Intermedullary Nail Fixation (Right)  2 Days Post-Op  Inpatient: Payor: [employer] MEDICARE / Plan: [employer] GOLD PLUS HMO / Product Type: *No Product type* /     ASSESSMENT:     REHAB RECOMMENDATIONS:   Recommendation to date pending progress:  Setting:  Short-term Rehab    Equipment:    To Be Determined     ASSESSMENT:  Ms. Vincent presents supine, sister at bedside.  She performed supine to sit with min/mod assist x 2 and additional time.  She stood with min/mod assist x 2 and was able to ambulate 2' to [facility] using rolling walker and min assist x 2.  She voided, cleaned herself, and then stood and ambulated another 6' with same assist.  She requires cues for walker management, pushing thru her arms, sequencing, and safety awareness.   She performed below LE Exercises with assist to complete.  She was left up in chair with needs in reach. Good progress towards goals.  Will continue with POC.     SUBJECTIVE:   Ms. Vincent states, \"I'm ok\"     Social/Functional Lives With: 
ACUTE PHYSICAL THERAPY GOALS:   (Developed with and agreed upon by patient and/or caregiver.)  (1.)Ms. Vincent will move from supine to sit and sit to supine  in bed with CONTACT GUARD ASSIST within 7 treatment day(s).    (2.)Ms. Vincent will transfer from bed to chair and chair to bed with CONTACT GUARD ASSIST using the least restrictive device within 7 treatment day(s).    (3.)Ms. Vincent will ambulate with CONTACT GUARD ASSIST for 150 feet with the least restrictive device within 7 treatment day(s).  _    PHYSICAL THERAPY: Daily Note AM   (Link to Caseload Tracking: PT Visit Days : 3  Time In/Out PT Charge Capture  Rehab Caseload Tracker  Orders    WBAT R LE    Shreya Vincent is a 68 y.o. female   PRIMARY DIAGNOSIS: Closed right hip fracture (HCC)  Closed fracture of right hip, initial encounter ([facility]) [S72.001A]  Closed right hip fracture, initial encounter ([facility]) [S72.001A]  Procedure(s) (LRB):  ORIF of Right Proximal Femur Fracture with Intermedullary Nail Fixation (Right)  3 Days Post-Op  Inpatient: Payor: [employer] MEDICARE / Plan: [employer] GOLD PLUS HMO / Product Type: *No Product type* /     ASSESSMENT:     REHAB RECOMMENDATIONS:   Recommendation to date pending progress:  Setting:  Short-term Rehab    Equipment:    To Be Determined     ASSESSMENT:  Ms. Vincent presents supine in bed, sister attending.  She required min assist x 2 and increased time for supine to sit.  She stood and ambulated about 5' using rolling walker and min assist x 2.  She is having more difficulty today due to pain with ambulation.  She required increased cues for walker placement, pushing thru arms, and sequencing for ambulation this date.  It  seems like she wasn't able to really push thru her arms to unweight her R LE in order to advance the L LE. She fatigues very quickly.  Pain meds requested from RN.  She performed LE exercises while sitting in chair and was left up with needs in reach.  No real progress this 
ACUTE PHYSICAL THERAPY GOALS:   (Developed with and agreed upon by patient and/or caregiver.)  (1.)Ms. Vincent will move from supine to sit and sit to supine  in bed with CONTACT GUARD ASSIST within 7 treatment day(s).    (2.)Ms. Vincent will transfer from bed to chair and chair to bed with CONTACT GUARD ASSIST using the least restrictive device within 7 treatment day(s).    (3.)Ms. Vincent will ambulate with CONTACT GUARD ASSIST for 150 feet with the least restrictive device within 7 treatment day(s).  _    PHYSICAL THERAPY: Daily Note AM   (Link to Caseload Tracking: PT Visit Days : 4  Time In/Out PT Charge Capture  Rehab Caseload Tracker  Orders    WBAT R LE    Shreya Vincent is a 68 y.o. female   PRIMARY DIAGNOSIS: Closed right hip fracture (HCC)  Closed fracture of right hip, initial encounter ([facility]) [S72.001A]  Closed right hip fracture, initial encounter ([facility]) [S72.001A]  Procedure(s) (LRB):  ORIF of Right Proximal Femur Fracture with Intermedullary Nail Fixation (Right)  4 Days Post-Op  Inpatient: Payor: [employer] MEDICARE / Plan: [employer] GOLD PLUS HMO / Product Type: *No Product type* /     ASSESSMENT:     REHAB RECOMMENDATIONS:   Recommendation to date pending progress:  Setting:  Short-term Rehab    Equipment:    To Be Determined     ASSESSMENT:  Ms. Vincent presents supine and agrees to therapy.  She requires increased time with all mobility.  She performs bed mobility with min/mod assist, scoots to edge of bed with CGA/min assist.  She stood with min assist and was able to ambulate 36' using rolling walker and min assist, chair follow behind.  She is moving better today!  She still doesn't put very much weight on her R LE, but is trying to put more.  She is staying in the walker better today as well.  Cues for hand placement, posture, and walker management.  She performed below LE Exercises with less assist today as well.  She was left up with needs in reach.  Good progress this 
ACUTE PHYSICAL THERAPY GOALS:   (Developed with and agreed upon by patient and/or caregiver.)  (1.)Ms. Vincent will move from supine to sit and sit to supine  in bed with CONTACT GUARD ASSIST within 7 treatment day(s).    (2.)Ms. Vincent will transfer from bed to chair and chair to bed with CONTACT GUARD ASSIST using the least restrictive device within 7 treatment day(s).    (3.)Ms. Vincent will ambulate with CONTACT GUARD ASSIST for 150 feet with the least restrictive device within 7 treatment day(s).  _    PHYSICAL THERAPY: Daily Note PM   (Link to Caseload Tracking: PT Visit Days : 2  Time In/Out PT Charge Capture  Rehab Caseload Tracker  Orders    WBAT R ZAHIDA Vincent is a 68 y.o. female   PRIMARY DIAGNOSIS: Closed right hip fracture (HCC)  Closed fracture of right hip, initial encounter ([facility]) [S72.001A]  Closed right hip fracture, initial encounter ([facility]) [S72.001A]  Procedure(s) (LRB):  ORIF of Right Proximal Femur Fracture with Intermedullary Nail Fixation (Right)  2 Days Post-Op  Inpatient: Payor: [employer] MEDICARE / Plan: [employer] GOLD PLUS HMO / Product Type: *No Product type* /     ASSESSMENT:     REHAB RECOMMENDATIONS:   Recommendation to date pending progress:  Setting:  Short-term Rehab    Equipment:    To Be Determined     ASSESSMENT:  Ms. Vincent presents sitting up in chair, family at bedside.  She stood with min/mod assist and ambulated about 6' to bed using rolling walker and min assist.  She ambulates at slow pace with cues for technique, walker management, pushing thru arms, and safety awareness.  She returned supine with min/mod assist and additional time for mobility.  She rolled L<>R with min/mod assist to adjust linens.  She is making progress towards goals. Will continue with POC.      SUBJECTIVE:   Ms. Vincent states, \"I'm ok\"     Social/Functional Lives With: Spouse  Type of Home: House  Home Layout: One level  Home Access: Stairs to enter with rails  Entrance 
ACUTE PHYSICAL THERAPY GOALS:   (Developed with and agreed upon by patient and/or caregiver.)  (1.)Ms. Vincent will move from supine to sit and sit to supine  in bed with CONTACT GUARD ASSIST within 7 treatment day(s).    (2.)Ms. Vincent will transfer from bed to chair and chair to bed with CONTACT GUARD ASSIST using the least restrictive device within 7 treatment day(s).    (3.)Ms. Vinecnt will ambulate with CONTACT GUARD ASSIST for 150 feet with the least restrictive device within 7 treatment day(s).  _    PHYSICAL THERAPY: Daily Note PM   (Link to Caseload Tracking: PT Visit Days : 3  Time In/Out PT Charge Capture  Rehab Caseload Tracker  Orders    WBAT R ZAHIDA Vincent is a 68 y.o. female   PRIMARY DIAGNOSIS: Closed right hip fracture (HCC)  Closed fracture of right hip, initial encounter ([facility]) [S72.001A]  Closed right hip fracture, initial encounter ([facility]) [S72.001A]  Procedure(s) (LRB):  ORIF of Right Proximal Femur Fracture with Intermedullary Nail Fixation (Right)  3 Days Post-Op  Inpatient: Payor: [employer] MEDICARE / Plan: [employer] GOLD PLUS HMO / Product Type: *No Product type* /     ASSESSMENT:     REHAB RECOMMENDATIONS:   Recommendation to date pending progress:  Setting:  Short-term Rehab    Equipment:    To Be Determined     ASSESSMENT:  Ms. Vincent presents sitting up in chair and is ready to get back to bed.  She has had pain meds and is doing better this afternoon. She does require increased time for all mobility. She stood and ambulated about 8' using rolling walker and min assist, chair follow.  She fatigues quickly and needed to sit down.  After resting, she stood and ambulated another 8' with same assist.  She continues to require cues for walker placement, pushing thru arms, and safety awareness.  She tends to push the walker too far out in front of her and has to pull it back in order to have leverage to push thru her arms.  She moves slowly and isn't able to increase 
Patient admitted for R hip fracture. Ortho consult is pending. Will HOLD OT until further plan/ orders from ortho received prior to mobilizing patient..  Anjel Selby, OT    
Physical Therapy Note:    Attempted to see patient this AM for physical therapy evaluation session. Patient with R hip fracture and plan for orthopedic surgery today.  Will hold today and re-attempt pending re-attempt as schedule permits/patient available. Thank you,    Radha Lares, PT     Rehab Caseload Tracker    
instructed in UE exercises as well this session. Making progress with goals. Will continue to benefit from skilled OT during stay.       SUBJECTIVE:     Ms. Vincent states, \"I feel okay\"     Social/Functional Lives With: Spouse  Type of Home: House  Home Layout: One level  Home Access: Stairs to enter with rails  Entrance Stairs - Number of Steps: 1  Bathroom Shower/Tub: Tub/Shower unit  Bathroom Toilet: Standard  Bathroom Equipment: None (son works construction and states he will install bathroom grab bars)  Bathroom Accessibility: Accessible  Home Equipment: Cane, Walker, standard  Receives Help From: Family  ADL Assistance: Independent  Homemaking Assistance: Independent  Homemaking Responsibilities: Yes  Ambulation Assistance: Independent  Transfer Assistance: Independent  Active : Yes  Mode of Transportation: Car  Occupation: Retired    OBJECTIVE:     LINES / DRAINS / AIRWAY: NA    RESTRICTIONS/PRECAUTIONS:  Restrictions/Precautions  Restrictions/Precautions: Fall Risk, Weight Bearing  Lower Extremity Weight Bearing Restrictions  Right Lower Extremity Weight Bearing: Weight Bearing As Tolerated        PAIN: VITALS / O2:   Pre Treatment:    0        Post Treatment: 0 Vitals          Oxygen        MOBILITY: I Mod I S SBA CGA Min Mod Max Total  NT x2 Comments:   Bed Mobility    Rolling [] [] [] [] [] [] [] [] [] [x] []    Supine to Sit [] [] [] [] [] [x] [] [] [] [] [x]    Scooting [] [] [] [] [] [x] [] [] [] [] []    Sit to Supine [] [] [] [] [] [] [] [] [] [x] []    Transfers    Sit to Stand [] [] [] [] [] [x] [] [] [] [] [x]    Bed to Chair [] [] [] [] [] [x] [] [] [] [] [x]    Stand to Sit [] [] [] [] [] [x] [] [] [] [] []    Tub/Shower [] [] [] [] [] [] [] [] [] [x] []     Toilet [] [] [] [] [] [x] [] [] [] [] [x]      [] [] [] [] [] [] [] [] [] [] []    I=Independent, Mod I=Modified Independent, S=Supervision/Setup, SBA=Standby Assistance, CGA=Contact Guard Assistance, Min=Minimal Assistance, 
only  Fall Precautions  DC disp:   F/U: 2 weeks postop for wound check and x rays       Signed By: Humberto Ruiz MD  1/12/2024,  8:11 AM      
Chloride 111 103 - 113 mmol/L    CO2 27 21 - 32 mmol/L    Anion Gap 3 2 - 11 mmol/L    Glucose 129 (H) 65 - 100 mg/dL    BUN 13 8 - 23 MG/DL    Creatinine 1.00 0.6 - 1.0 MG/DL    Est, Glom Filt Rate >60 >60 ml/min/1.73m2    Calcium 7.8 (L) 8.3 - 10.4 MG/DL   POCT Glucose    Collection Time: 01/12/24  6:24 AM   Result Value Ref Range    POC Glucose 132 (H) 65 - 100 mg/dL    Performed by: Cristal    POCT Glucose    Collection Time: 01/12/24 11:57 AM   Result Value Ref Range    POC Glucose 137 (H) 65 - 100 mg/dL    Performed by: Gopal        Current Meds:  Current Facility-Administered Medications   Medication Dose Route Frequency    insulin lispro (HUMALOG) injection vial 0-8 Units  0-8 Units SubCUTAneous TID WC    insulin lispro (HUMALOG) injection vial 0-4 Units  0-4 Units SubCUTAneous Nightly    glucose chewable tablet 16 g  4 tablet Oral PRN    dextrose bolus 10% 125 mL  125 mL IntraVENous PRN    Or    dextrose bolus 10% 250 mL  250 mL IntraVENous PRN    Glucagon Emergency KIT 1 mg  1 mg SubCUTAneous PRN    dextrose 10 % infusion   IntraVENous Continuous PRN    potassium chloride (KLOR-CON M) extended release tablet 40 mEq  40 mEq Oral PRN    Or    potassium bicarb-citric acid (EFFER-K) effervescent tablet 40 mEq  40 mEq Oral PRN    Or    potassium chloride 10 mEq/100 mL IVPB (Peripheral Line)  10 mEq IntraVENous PRN    magnesium sulfate 2000 mg in 50 mL IVPB premix  2,000 mg IntraVENous PRN    ondansetron (ZOFRAN-ODT) disintegrating tablet 4 mg  4 mg Oral Q8H PRN    Or    ondansetron (ZOFRAN) injection 4 mg  4 mg IntraVENous Q6H PRN    polyethylene glycol (GLYCOLAX) packet 17 g  17 g Oral Daily PRN    acetaminophen (TYLENOL) tablet 650 mg  650 mg Oral Q6H PRN    Or    acetaminophen (TYLENOL) suppository 650 mg  650 mg Rectal Q6H PRN    pravastatin (PRAVACHOL) tablet 40 mg  40 mg Oral Nightly    gabapentin (NEURONTIN) capsule 100 mg  100 mg Oral TID    lisinopril (PRINIVIL;ZESTRIL) tablet 5 mg

## 2024-01-14 NOTE — PLAN OF CARE
Problem: Chronic Conditions and Co-morbidities  Goal: Patient's chronic conditions and co-morbidity symptoms are monitored and maintained or improved  1/14/2024 1126 by Lizzy Cleary RN  Outcome: Progressing  Flowsheets (Taken 1/14/2024 0729)  Care Plan - Patient's Chronic Conditions and Co-Morbidity Symptoms are Monitored and Maintained or Improved: Monitor and assess patient's chronic conditions and comorbid symptoms for stability, deterioration, or improvement  1/14/2024 0009 by Mattie Nelson RN  Outcome: Progressing  1/14/2024 0009 by Mattie Nelson RN  Outcome: Progressing     Problem: Pain  Goal: Verbalizes/displays adequate comfort level or baseline comfort level  1/14/2024 1126 by Lizzy Cleary RN  Outcome: Progressing  1/14/2024 0009 by Mattie Nelson RN  Outcome: Progressing  1/14/2024 0009 by Mattie Nelson RN  Outcome: Progressing     Problem: Safety - Adult  Goal: Free from fall injury  1/14/2024 1126 by Lizzy Cleary RN  Outcome: Progressing  Flowsheets (Taken 1/14/2024 0729)  Free From Fall Injury: Instruct family/caregiver on patient safety  1/14/2024 0009 by Mattie Nelson RN  Outcome: Progressing  1/14/2024 0009 by Mattie Nelson RN  Outcome: Progressing     Problem: Discharge Planning  Goal: Discharge to home or other facility with appropriate resources  1/14/2024 1126 by Lizzy Cleary RN  Outcome: Progressing  Flowsheets (Taken 1/14/2024 0729)  Discharge to home or other facility with appropriate resources:   Identify barriers to discharge with patient and caregiver   Arrange for needed discharge resources and transportation as appropriate   Identify discharge learning needs (meds, wound care, etc)  1/14/2024 0009 by Mattie Nelson RN  Outcome: Progressing  1/14/2024 0009 by Mattie Nelson RN  Outcome: Progressing     Problem: ABCDS Injury Assessment  Goal: Absence of physical injury  1/14/2024 1126 by Lizzy Cleary RN  Outcome: Progressing  Flowsheets (Taken

## 2024-01-14 NOTE — DISCHARGE SUMMARY
100 mg/dL    BUN 11 8 - 23 MG/DL    Creatinine 0.80 0.6 - 1.0 MG/DL    Est, Glom Filt Rate >60 >60 ml/min/1.73m2    Calcium 8.3 8.3 - 10.4 MG/DL   POCT Glucose    Collection Time: 01/14/24  6:38 AM   Result Value Ref Range    POC Glucose 128 (H) 65 - 100 mg/dL    Performed by: Cristal    COVID-19, Rapid    Collection Time: 01/14/24 11:10 AM    Specimen: Nasopharyngeal   Result Value Ref Range    Source NASAL      SARS-CoV-2, Rapid Not detected NOTD     POCT Glucose    Collection Time: 01/14/24 11:21 AM   Result Value Ref Range    POC Glucose 166 (H) 65 - 100 mg/dL    Performed by: Arcelia        Allergies   Allergen Reactions    Cephalexin Nausea And Vomiting    Oxycodone Nausea And Vomiting     Patient states no reaction to this medication on 1/09/24    Sulfamethoxazole-Trimethoprim Nausea And Vomiting     Immunization History   Administered Date(s) Administered    Influenza Virus Vaccine 11/07/2008    PPD Test 01/10/2024    TDaP, ADACEL (age 10y-64y), BOOSTRIX (age 10y+), IM, 0.5mL 09/30/2010       Recent Vital Data:  Patient Vitals for the past 24 hrs:   Temp Pulse Resp BP SpO2   01/14/24 0928 -- -- 22 -- --   01/14/24 0729 98.4 °F (36.9 °C) 81 16 (!) 127/46 97 %   01/13/24 1951 100 °F (37.8 °C) (!) 105 16 (!) 111/51 96 %       Oxygen Therapy  SpO2: 97 %  Pulse via Oximetry: 69 beats per minute  Pulse Oximeter Device Mode: Continuous  Pulse Oximeter Device Location: Right, Hand  O2 Device: None (Room air)  O2 Flow Rate (L/min): 2 L/min    Estimated body mass index is 28.32 kg/m² as calculated from the following:    Height as of this encounter: 1.626 m (5' 4\").    Weight as of this encounter: 74.8 kg (165 lb).    Intake/Output Summary (Last 24 hours) at 1/14/2024 1338  Last data filed at 1/14/2024 0517  Gross per 24 hour   Intake 480 ml   Output 850 ml   Net -370 ml         Physical Exam:    General:    Well nourished.  No overt distress  Head:  Normocephalic, atraumatic  Eyes:  Sclerae appear

## 2024-01-15 ENCOUNTER — CARE COORDINATION (OUTPATIENT)
Dept: CARE COORDINATION | Facility: CLINIC | Age: 69
End: 2024-01-15

## 2024-01-15 LAB
BACTERIA SPEC CULT: NORMAL
SERVICE CMNT-IMP: NORMAL

## 2024-01-15 NOTE — CARE COORDINATION
Care Transitions Outreach Attempt    Call within 2 business days of discharge: Yes   Attempted to reach patient for transitions of care follow up. Unable to reach patient.    Patient: Shreya Ojedatower Patient : 1955 MRN: [id number]    Last Discharge Facility       Date Complaint Diagnosis Description Type Department Provider    24 Fall Closed fracture of right hip with routine healing, subsequent encounter ... ED to Hosp-Admission (Discharged) (ADMITTED) [facility] Rajendra Dewey DO; Keith, ...              Was this an external facility discharge? No Discharge Facility Name: [facility]    Noted following upcoming appointments from discharge chart review:   BS follow up appointment(s):   Future Appointments   Date Time Provider Department Center   2024  9:15 AM Moises Mendoza DO [facility] AM[facility]   2024  9:00 AM Chris Estevez PA [facility] AM[facility]     Non-[facility]  follow up appointment(s): na

## 2024-01-15 NOTE — CARE COORDINATION
Care Transitions Outreach Attempt    Call within 2 business days of discharge: Yes   Attempted to reach patient for transitions of care follow up. Unable to reach patient.    Patient: Shreya Ojedatower Patient : 1955 MRN: [id number]    Last Discharge Facility       Date Complaint Diagnosis Description Type Department Provider    24 Fall Closed fracture of right hip with routine healing, subsequent encounter ... ED to Hosp-Admission (Discharged) (ADMITTED) [facility] Rajendra Dewey, ; [facility], ...              Was this an external facility discharge? No Discharge Facility Name: [facility]    Noted following upcoming appointments from discharge chart review:   BS follow up appointment(s):   Future Appointments   Date Time Provider Department Center   2024 To Be Determined Levon Howell RN [facility] HOME HEAL   2024  9:15 AM Moises Mendoza DO [facility] AM[facility]   2024  9:00 AM Chris Estevez PA [facility] AM[facility]     Non-BS  follow up appointment(s): na

## 2024-01-16 ENCOUNTER — CARE COORDINATION (OUTPATIENT)
Dept: CARE COORDINATION | Facility: CLINIC | Age: 69
End: 2024-01-16

## 2024-01-16 ENCOUNTER — TELEPHONE (OUTPATIENT)
Dept: ORTHOPEDIC SURGERY | Age: 69
End: 2024-01-16

## 2024-01-16 ENCOUNTER — HOME CARE VISIT (OUTPATIENT)
Dept: SCHEDULING | Facility: HOME HEALTH | Age: 69
End: 2024-01-16

## 2024-01-16 VITALS
DIASTOLIC BLOOD PRESSURE: 64 MMHG | TEMPERATURE: 98.7 F | RESPIRATION RATE: 16 BRPM | SYSTOLIC BLOOD PRESSURE: 138 MMHG | OXYGEN SATURATION: 94 % | HEART RATE: 93 BPM

## 2024-01-16 PROCEDURE — G0299 HHS/HOSPICE OF RN EA 15 MIN: HCPCS

## 2024-01-16 PROCEDURE — 0221000100 HH NO PAY CLAIM PROCEDURE

## 2024-01-16 ASSESSMENT — ENCOUNTER SYMPTOMS
PAIN LOCATION - PAIN QUALITY: THROBBING, SHOOTING
HEMOPTYSIS: 0
DYSPNEA ACTIVITY LEVEL: AFTER AMBULATING LESS THAN 10 FT
CONSTIPATION: 1

## 2024-01-16 NOTE — CARE COORDINATION
Care Transitions Outreach Attempt    Call within 2 business days of discharge: Yes   Attempted to reach patient for transitions of care follow up. Unable to reach patient.  No further outreach is indicated.    Patient: Shreya Vincent Patient : 1955 MRN: [id number]    Last Discharge Facility       Date Complaint Diagnosis Description Type Department Provider    24 Fall Closed fracture of right hip with routine healing, subsequent encounter ... ED to Hosp-Admission (Discharged) (ADMITTED) [facility] Rajendra Dewey, ; [facility], ...              Was this an external facility discharge? No Discharge Facility Name: [facility]    Noted following upcoming appointments from discharge chart review:   BS follow up appointment(s):   Future Appointments   Date Time Provider Department Center   2024  9:15 AM Moises Mendoza DO [facility] AM[facility]   2024  9:00 AM Chris Estevez PA [facility] AM[facility]     Non-BS  follow up appointment(s): na

## 2024-01-16 NOTE — TELEPHONE ENCOUNTER
Will you be following for home health care orders?  They have a question about her dressing on her right hip. Please give him a call.

## 2024-01-17 ENCOUNTER — HOME CARE VISIT (OUTPATIENT)
Dept: SCHEDULING | Facility: HOME HEALTH | Age: 69
End: 2024-01-17
Payer: MEDICARE

## 2024-01-17 ENCOUNTER — TELEPHONE (OUTPATIENT)
Dept: ORTHOPEDIC SURGERY | Age: 69
End: 2024-01-17

## 2024-01-17 VITALS
RESPIRATION RATE: 16 BRPM | OXYGEN SATURATION: 98 % | DIASTOLIC BLOOD PRESSURE: 60 MMHG | SYSTOLIC BLOOD PRESSURE: 130 MMHG | HEART RATE: 88 BPM | TEMPERATURE: 97 F

## 2024-01-17 PROCEDURE — G0151 HHCP-SERV OF PT,EA 15 MIN: HCPCS

## 2024-01-17 NOTE — TELEPHONE ENCOUNTER
Torsten is calling to report that PT eval completed today and needs verbal orders for twice per week for one week and 3 times per week for one week and then twice per week for 2 weeks.

## 2024-01-18 ENCOUNTER — HOME CARE VISIT (OUTPATIENT)
Dept: SCHEDULING | Facility: HOME HEALTH | Age: 69
End: 2024-01-18

## 2024-01-18 ENCOUNTER — TELEPHONE (OUTPATIENT)
Dept: ORTHOPEDIC SURGERY | Age: 69
End: 2024-01-18

## 2024-01-18 VITALS
HEART RATE: 86 BPM | SYSTOLIC BLOOD PRESSURE: 112 MMHG | OXYGEN SATURATION: 96 % | RESPIRATION RATE: 20 BRPM | DIASTOLIC BLOOD PRESSURE: 60 MMHG | TEMPERATURE: 97.6 F

## 2024-01-18 VITALS
HEART RATE: 80 BPM | SYSTOLIC BLOOD PRESSURE: 138 MMHG | DIASTOLIC BLOOD PRESSURE: 68 MMHG | OXYGEN SATURATION: 93 % | TEMPERATURE: 97.8 F

## 2024-01-18 PROCEDURE — G0299 HHS/HOSPICE OF RN EA 15 MIN: HCPCS

## 2024-01-18 PROCEDURE — G0152 HHCP-SERV OF OT,EA 15 MIN: HCPCS

## 2024-01-18 ASSESSMENT — ENCOUNTER SYMPTOMS
CONSTIPATION: 1
CONSTIPATION: 1
FLATUS: 1
DYSPNEA ACTIVITY LEVEL: AFTER AMBULATING MORE THAN 20 FT

## 2024-01-18 NOTE — TELEPHONE ENCOUNTER
Returned call to Monet w/ [facility] and advised per Ortho protocol we don't treat Bedsores and PCP would need to give orders for bedsore care. Monet voiced complete understanding. LH

## 2024-01-18 NOTE — TELEPHONE ENCOUNTER
Karin is calling to report that pain levels are still high 8/10 and most concerning is that she hasn't had a BM since the day of being admitted which was the 9th. She has been doing Miralax since Monday daily with no success. Also she needs a refill of her Oxycodone sent to the pharmacy on file.

## 2024-01-18 NOTE — TELEPHONE ENCOUNTER
She noticed that the patient had a sore on her buttocks. She is asking for an order to treat with Desitin and silicone foam dressing.

## 2024-01-18 NOTE — TELEPHONE ENCOUNTER
Returned call to Pt but had to speak to Pt's Sister Kezia, she states the pt has been taking Miralax since Monday with NO BM yet. Pt's sister advised to Continue can add Ducolax if needed, Increased fluids.Advised if pt becomes uncomfortable without BM may need to go to ER. Sister voiced complete understanding.LH

## 2024-01-19 ENCOUNTER — HOME CARE VISIT (OUTPATIENT)
Dept: SCHEDULING | Facility: HOME HEALTH | Age: 69
End: 2024-01-19

## 2024-01-19 DIAGNOSIS — S72.001D CLOSED FRACTURE OF RIGHT HIP WITH ROUTINE HEALING, SUBSEQUENT ENCOUNTER: ICD-10-CM

## 2024-01-19 PROCEDURE — G0151 HHCP-SERV OF PT,EA 15 MIN: HCPCS

## 2024-01-19 RX ORDER — OXYCODONE HYDROCHLORIDE 5 MG/1
5 TABLET ORAL EVERY 4 HOURS PRN
Qty: 18 TABLET | Refills: 0 | Status: SHIPPED | OUTPATIENT
Start: 2024-01-19 | End: 2024-01-22

## 2024-01-20 VITALS
DIASTOLIC BLOOD PRESSURE: 54 MMHG | TEMPERATURE: 98 F | OXYGEN SATURATION: 96 % | RESPIRATION RATE: 16 BRPM | HEART RATE: 96 BPM | SYSTOLIC BLOOD PRESSURE: 108 MMHG

## 2024-01-20 ASSESSMENT — ENCOUNTER SYMPTOMS: PAIN LOCATION - PAIN QUALITY: ACHE, SORE

## 2024-01-22 ENCOUNTER — HOME CARE VISIT (OUTPATIENT)
Dept: SCHEDULING | Facility: HOME HEALTH | Age: 69
End: 2024-01-22
Payer: MEDICARE

## 2024-01-22 VITALS
TEMPERATURE: 97.5 F | RESPIRATION RATE: 14 BRPM | DIASTOLIC BLOOD PRESSURE: 64 MMHG | HEART RATE: 88 BPM | SYSTOLIC BLOOD PRESSURE: 122 MMHG | OXYGEN SATURATION: 92 %

## 2024-01-22 PROCEDURE — G0151 HHCP-SERV OF PT,EA 15 MIN: HCPCS

## 2024-01-22 ASSESSMENT — ENCOUNTER SYMPTOMS: PAIN LOCATION - PAIN QUALITY: ACHES, SORE

## 2024-01-23 ENCOUNTER — HOME CARE VISIT (OUTPATIENT)
Dept: SCHEDULING | Facility: HOME HEALTH | Age: 69
End: 2024-01-23
Payer: MEDICARE

## 2024-01-23 VITALS
DIASTOLIC BLOOD PRESSURE: 71 MMHG | OXYGEN SATURATION: 98 % | SYSTOLIC BLOOD PRESSURE: 119 MMHG | HEART RATE: 86 BPM | RESPIRATION RATE: 17 BRPM | TEMPERATURE: 98 F

## 2024-01-23 PROCEDURE — G0158 HHC OT ASSISTANT EA 15: HCPCS

## 2024-01-24 ENCOUNTER — HOME CARE VISIT (OUTPATIENT)
Dept: SCHEDULING | Facility: HOME HEALTH | Age: 69
End: 2024-01-24
Payer: MEDICARE

## 2024-01-24 VITALS
SYSTOLIC BLOOD PRESSURE: 128 MMHG | HEART RATE: 86 BPM | RESPIRATION RATE: 16 BRPM | OXYGEN SATURATION: 94 % | TEMPERATURE: 97 F | DIASTOLIC BLOOD PRESSURE: 63 MMHG

## 2024-01-24 PROCEDURE — G0299 HHS/HOSPICE OF RN EA 15 MIN: HCPCS

## 2024-01-24 PROCEDURE — G0157 HHC PT ASSISTANT EA 15: HCPCS

## 2024-01-24 ASSESSMENT — ENCOUNTER SYMPTOMS: PAIN LOCATION - PAIN QUALITY: STABBING

## 2024-01-25 ENCOUNTER — OFFICE VISIT (OUTPATIENT)
Dept: ORTHOPEDIC SURGERY | Age: 69
End: 2024-01-25

## 2024-01-25 ENCOUNTER — HOME CARE VISIT (OUTPATIENT)
Dept: SCHEDULING | Facility: HOME HEALTH | Age: 69
End: 2024-01-25
Payer: MEDICARE

## 2024-01-25 VITALS
HEART RATE: 86 BPM | TEMPERATURE: 97.6 F | OXYGEN SATURATION: 96 % | DIASTOLIC BLOOD PRESSURE: 68 MMHG | RESPIRATION RATE: 16 BRPM | SYSTOLIC BLOOD PRESSURE: 118 MMHG

## 2024-01-25 DIAGNOSIS — S72.001A CLOSED HIP FRACTURE REQUIRING OPERATIVE REPAIR, RIGHT, INITIAL ENCOUNTER (HCC): Primary | ICD-10-CM

## 2024-01-25 DIAGNOSIS — S72.001D CLOSED FRACTURE OF RIGHT HIP WITH ROUTINE HEALING, SUBSEQUENT ENCOUNTER: ICD-10-CM

## 2024-01-25 PROCEDURE — 99024 POSTOP FOLLOW-UP VISIT: CPT | Performed by: PHYSICIAN ASSISTANT

## 2024-01-25 RX ORDER — BACLOFEN 10 MG/1
5 TABLET ORAL NIGHTLY PRN
Qty: 15 TABLET | Refills: 0 | Status: SHIPPED | OUTPATIENT
Start: 2024-01-25

## 2024-01-25 RX ORDER — OXYCODONE HYDROCHLORIDE 5 MG/1
5 TABLET ORAL EVERY 4 HOURS PRN
Qty: 18 TABLET | Refills: 0 | Status: SHIPPED | OUTPATIENT
Start: 2024-01-25 | End: 2024-01-28

## 2024-01-25 ASSESSMENT — ENCOUNTER SYMPTOMS
STOOL DESCRIPTION: SOFT
PAIN LOCATION - PAIN QUALITY: DULL ACHE

## 2024-01-25 NOTE — PROGRESS NOTES
[facility]            Patient ID:  Name: Shreya Vincent  AGE/Gender: 68 y.o. female  MRN: [id number]  : 1955    Date of Service: 2024          ALLERGIES:   Allergies   Allergen Reactions    Cephalexin Nausea And Vomiting    Oxycodone Nausea And Vomiting     Patient states no reaction to this medication on 24    Sulfamethoxazole-Trimethoprim Nausea And Vomiting          History:  The patient is seen today for follow-up.  The patient sustained  a right Hip fracture and underwent ORIF by Dr. Ruiz at [facility].  The patient has been progressing slowly with physical therapy.  Patient is here today for wound check.  They have no other complaints or concerns:      Physical Exam:       General:  On exam the patient is a pleasant 68 y.o. female in no acute distress, A&O x 3.   Hip: This incision is healing there is swelling consistent with the postoperative time frame. There is some mild serous drainage at the distal apex of the proximal incision. ROM not assessed. The calf is soft and non-tender.         Assessment and Plan:   The incision is healing but am concerned about the drainage at the distal apex of the proximal incision. Instructed patient and family to do daily dressing change and if this continues to have any drainage on Monday to call us and we will need to see her in the office on Tuesday otherwise we will see her in 4 weeks and will get x-rays at that time. WBAT with walker.      Electronically signed by:   KENYA Pelletier  2024,  9:13 AM

## 2024-01-26 ENCOUNTER — HOME CARE VISIT (OUTPATIENT)
Dept: SCHEDULING | Facility: HOME HEALTH | Age: 69
End: 2024-01-26
Payer: MEDICARE

## 2024-01-26 VITALS
TEMPERATURE: 97.1 F | RESPIRATION RATE: 16 BRPM | DIASTOLIC BLOOD PRESSURE: 60 MMHG | HEART RATE: 90 BPM | OXYGEN SATURATION: 97 % | SYSTOLIC BLOOD PRESSURE: 112 MMHG

## 2024-01-26 VITALS
SYSTOLIC BLOOD PRESSURE: 110 MMHG | TEMPERATURE: 98 F | OXYGEN SATURATION: 96 % | DIASTOLIC BLOOD PRESSURE: 60 MMHG | RESPIRATION RATE: 16 BRPM | HEART RATE: 85 BPM

## 2024-01-26 PROCEDURE — G0299 HHS/HOSPICE OF RN EA 15 MIN: HCPCS

## 2024-01-26 PROCEDURE — G0157 HHC PT ASSISTANT EA 15: HCPCS

## 2024-01-26 ASSESSMENT — ENCOUNTER SYMPTOMS
PAIN LOCATION - PAIN QUALITY: THROBBING
PAIN LOCATION - PAIN QUALITY: STABBING

## 2024-01-31 ENCOUNTER — HOME CARE VISIT (OUTPATIENT)
Dept: SCHEDULING | Facility: HOME HEALTH | Age: 69
End: 2024-01-31
Payer: MEDICARE

## 2024-01-31 VITALS
OXYGEN SATURATION: 96 % | TEMPERATURE: 97.9 F | HEART RATE: 72 BPM | RESPIRATION RATE: 16 BRPM | DIASTOLIC BLOOD PRESSURE: 68 MMHG | SYSTOLIC BLOOD PRESSURE: 140 MMHG

## 2024-01-31 VITALS
RESPIRATION RATE: 16 BRPM | OXYGEN SATURATION: 99 % | TEMPERATURE: 98 F | SYSTOLIC BLOOD PRESSURE: 134 MMHG | HEART RATE: 93 BPM | DIASTOLIC BLOOD PRESSURE: 72 MMHG

## 2024-01-31 PROCEDURE — G0157 HHC PT ASSISTANT EA 15: HCPCS

## 2024-01-31 PROCEDURE — G0158 HHC OT ASSISTANT EA 15: HCPCS

## 2024-01-31 PROCEDURE — G0299 HHS/HOSPICE OF RN EA 15 MIN: HCPCS

## 2024-01-31 ASSESSMENT — ENCOUNTER SYMPTOMS
PAIN LOCATION - PAIN QUALITY: ACHE, SORE
PAIN LOCATION - PAIN QUALITY: THROBBING, SORE

## 2024-02-01 ENCOUNTER — HOME CARE VISIT (OUTPATIENT)
Dept: SCHEDULING | Facility: HOME HEALTH | Age: 69
End: 2024-02-01
Payer: MEDICARE

## 2024-02-01 VITALS
SYSTOLIC BLOOD PRESSURE: 126 MMHG | TEMPERATURE: 97.1 F | DIASTOLIC BLOOD PRESSURE: 68 MMHG | HEART RATE: 80 BPM | RESPIRATION RATE: 16 BRPM | OXYGEN SATURATION: 99 %

## 2024-02-01 VITALS
DIASTOLIC BLOOD PRESSURE: 63 MMHG | SYSTOLIC BLOOD PRESSURE: 118 MMHG | OXYGEN SATURATION: 98 % | HEART RATE: 70 BPM | RESPIRATION RATE: 16 BRPM | TEMPERATURE: 98 F

## 2024-02-01 PROCEDURE — G0157 HHC PT ASSISTANT EA 15: HCPCS

## 2024-02-01 ASSESSMENT — ENCOUNTER SYMPTOMS: PAIN LOCATION - PAIN QUALITY: SORE, ACHING

## 2024-02-02 ENCOUNTER — HOME CARE VISIT (OUTPATIENT)
Dept: SCHEDULING | Facility: HOME HEALTH | Age: 69
End: 2024-02-02
Payer: MEDICARE

## 2024-02-02 VITALS
SYSTOLIC BLOOD PRESSURE: 130 MMHG | RESPIRATION RATE: 16 BRPM | TEMPERATURE: 98 F | HEART RATE: 73 BPM | DIASTOLIC BLOOD PRESSURE: 78 MMHG | OXYGEN SATURATION: 97 %

## 2024-02-02 PROCEDURE — G0158 HHC OT ASSISTANT EA 15: HCPCS

## 2024-02-02 PROCEDURE — G0299 HHS/HOSPICE OF RN EA 15 MIN: HCPCS

## 2024-02-02 ASSESSMENT — ENCOUNTER SYMPTOMS: PAIN LOCATION - PAIN QUALITY: ACHE

## 2024-02-04 VITALS
HEART RATE: 74 BPM | SYSTOLIC BLOOD PRESSURE: 132 MMHG | TEMPERATURE: 97.9 F | RESPIRATION RATE: 18 BRPM | DIASTOLIC BLOOD PRESSURE: 70 MMHG | OXYGEN SATURATION: 100 %

## 2024-02-05 ENCOUNTER — TELEPHONE (OUTPATIENT)
Dept: ORTHOPEDIC SURGERY | Age: 69
End: 2024-02-05

## 2024-02-05 ENCOUNTER — HOME CARE VISIT (OUTPATIENT)
Dept: SCHEDULING | Facility: HOME HEALTH | Age: 69
End: 2024-02-05
Payer: MEDICARE

## 2024-02-05 VITALS
TEMPERATURE: 98 F | HEART RATE: 66 BPM | DIASTOLIC BLOOD PRESSURE: 68 MMHG | RESPIRATION RATE: 16 BRPM | SYSTOLIC BLOOD PRESSURE: 120 MMHG | OXYGEN SATURATION: 98 %

## 2024-02-05 VITALS
OXYGEN SATURATION: 98 % | HEART RATE: 66 BPM | DIASTOLIC BLOOD PRESSURE: 68 MMHG | TEMPERATURE: 97.3 F | RESPIRATION RATE: 16 BRPM | SYSTOLIC BLOOD PRESSURE: 120 MMHG

## 2024-02-05 DIAGNOSIS — S72.001D CLOSED FRACTURE OF RIGHT HIP WITH ROUTINE HEALING, SUBSEQUENT ENCOUNTER: ICD-10-CM

## 2024-02-05 PROCEDURE — G0157 HHC PT ASSISTANT EA 15: HCPCS

## 2024-02-05 PROCEDURE — G0158 HHC OT ASSISTANT EA 15: HCPCS

## 2024-02-05 RX ORDER — OXYCODONE HYDROCHLORIDE 5 MG/1
5 TABLET ORAL EVERY 4 HOURS PRN
Qty: 18 TABLET | Refills: 0 | Status: SHIPPED | OUTPATIENT
Start: 2024-02-05 | End: 2024-02-08

## 2024-02-07 ENCOUNTER — TELEPHONE (OUTPATIENT)
Dept: ORTHOPEDIC SURGERY | Age: 69
End: 2024-02-07

## 2024-02-07 ENCOUNTER — HOME CARE VISIT (OUTPATIENT)
Dept: SCHEDULING | Facility: HOME HEALTH | Age: 69
End: 2024-02-07
Payer: MEDICARE

## 2024-02-07 VITALS
SYSTOLIC BLOOD PRESSURE: 126 MMHG | OXYGEN SATURATION: 97 % | RESPIRATION RATE: 16 BRPM | TEMPERATURE: 98.4 F | DIASTOLIC BLOOD PRESSURE: 70 MMHG | HEART RATE: 65 BPM

## 2024-02-07 PROCEDURE — G0299 HHS/HOSPICE OF RN EA 15 MIN: HCPCS

## 2024-02-07 ASSESSMENT — ENCOUNTER SYMPTOMS: PAIN LOCATION - PAIN QUALITY: ACHE, SORE

## 2024-02-07 NOTE — TELEPHONE ENCOUNTER
Returned call to Rebecca chambers/[facility] after reviewing pictures of incision,advised on VM ok for pt to shower. LH

## 2024-02-07 NOTE — TELEPHONE ENCOUNTER
She wants to know if she can shower now. She has uploaded a picture of her incision. There is no drainage. Please give her a call after reviewing.

## 2024-02-08 ENCOUNTER — HOME CARE VISIT (OUTPATIENT)
Dept: SCHEDULING | Facility: HOME HEALTH | Age: 69
End: 2024-02-08
Payer: MEDICARE

## 2024-02-08 VITALS
HEART RATE: 84 BPM | RESPIRATION RATE: 16 BRPM | OXYGEN SATURATION: 99 % | DIASTOLIC BLOOD PRESSURE: 80 MMHG | SYSTOLIC BLOOD PRESSURE: 130 MMHG | TEMPERATURE: 98.4 F

## 2024-02-08 PROCEDURE — G0151 HHCP-SERV OF PT,EA 15 MIN: HCPCS

## 2024-02-09 PROBLEM — N39.0 UTI (URINARY TRACT INFECTION): Status: RESOLVED | Noted: 2024-01-10 | Resolved: 2024-02-09

## 2024-02-09 NOTE — TELEPHONE ENCOUNTER
Needs refills on     metFORMIN (GLUCOPHAGE) 1000 MG tablet [   And  pravastatin (PRAVACHOL) 40 MG tablet     Send to   [id number] - [place], [place] - [address] - P [phone] - F [phone]

## 2024-02-10 RX ORDER — PRAVASTATIN SODIUM 40 MG
40 TABLET ORAL DAILY
Qty: 90 TABLET | Refills: 1 | Status: SHIPPED | OUTPATIENT
Start: 2024-02-10

## 2024-02-13 ENCOUNTER — HOME CARE VISIT (OUTPATIENT)
Dept: SCHEDULING | Facility: HOME HEALTH | Age: 69
End: 2024-02-13
Payer: MEDICARE

## 2024-02-13 ENCOUNTER — HOME CARE VISIT (OUTPATIENT)
Dept: HOME HEALTH SERVICES | Facility: HOME HEALTH | Age: 69
End: 2024-02-13
Payer: MEDICARE

## 2024-02-14 ENCOUNTER — OFFICE VISIT (OUTPATIENT)
Dept: ORTHOPEDIC SURGERY | Age: 69
End: 2024-02-14

## 2024-02-14 DIAGNOSIS — S72.001A CLOSED HIP FRACTURE REQUIRING OPERATIVE REPAIR, RIGHT, INITIAL ENCOUNTER (HCC): Primary | ICD-10-CM

## 2024-02-14 PROCEDURE — 99024 POSTOP FOLLOW-UP VISIT: CPT | Performed by: ORTHOPAEDIC SURGERY

## 2024-02-14 NOTE — PROGRESS NOTES
Subjective: 5 weeks post a gamma nail fixation for intertrochanteric fracture of the right femur.  She is here with no complaints.    Objective: She is walking with a walker full weightbearing.  Incision is healed nicely.    X-ray: Right hip and AP and frog-lateral views reveal satisfactory appearance and alignment of the intertrochanteric fracture fixated with a short gamma nail.    Assessment and plan: Patient advised to continue activity as tolerated and graduate to a cane whenever she feels up to it.    She only has a few pills left of oxycodone and muscle relaxer.  I told her that it is best to get off of it and take it only as needed.  From here on she will take Tylenol or ibuprofen 800 as needed for pain.  She has only 1 pill of gabapentin left and that can be discontinued.    Importance of taking multivitamins calcium and adequate intake of proteins discussed.    Follow-up in 5 weeks with x-rays of the right hip in  2 views at which time we will probably discharge her

## 2024-02-15 ENCOUNTER — HOME CARE VISIT (OUTPATIENT)
Dept: SCHEDULING | Facility: HOME HEALTH | Age: 69
End: 2024-02-15
Payer: MEDICARE

## 2024-02-15 VITALS
OXYGEN SATURATION: 98 % | TEMPERATURE: 97.6 F | HEART RATE: 85 BPM | DIASTOLIC BLOOD PRESSURE: 74 MMHG | SYSTOLIC BLOOD PRESSURE: 128 MMHG | RESPIRATION RATE: 18 BRPM

## 2024-02-15 PROCEDURE — G0299 HHS/HOSPICE OF RN EA 15 MIN: HCPCS

## 2024-03-20 ENCOUNTER — OFFICE VISIT (OUTPATIENT)
Dept: ORTHOPEDIC SURGERY | Age: 69
End: 2024-03-20

## 2024-03-20 DIAGNOSIS — S72.001A CLOSED HIP FRACTURE REQUIRING OPERATIVE REPAIR, RIGHT, INITIAL ENCOUNTER (HCC): Primary | ICD-10-CM

## 2024-03-20 PROCEDURE — 99024 POSTOP FOLLOW-UP VISIT: CPT | Performed by: ORTHOPAEDIC SURGERY

## 2024-03-20 NOTE — PROGRESS NOTES
Subjective: Gamma nail fixation right femur January 10.  She is here with no specific complaints.  She says she uses a walker most of the time.  She wonders if she could get in a bathtub for a bath.    Objective: She is using a cane in her right hand.  She is full weightbearing.  She does seem a little unsteady.  I told her  that she probably should use a walker for balance purposes.  Is okay to do full weightbearing however.    X-rays: Right hip and AP and lateral views reveals a healing intertrochanteric fracture of the fracture and the hardware in good position and alignment there is some retraction of the lag screw but to be expected.    Assessment and plan: Healing intertrochanteric fracture right femur 10 weeks post surgery.  Importance of maintaining good nutritional status with adequate intake of protein multivitamins calcium etc. discussed.  I told the patient that she could use the cane in which ever hand she feels more comfortable and natural with.  A four-wheel walker with a seat might be beneficial for balance purposes.  Patient is discharged.

## 2024-04-03 ENCOUNTER — TELEPHONE (OUTPATIENT)
Dept: ORTHOPEDIC SURGERY | Age: 69
End: 2024-04-03

## 2024-04-03 NOTE — TELEPHONE ENCOUNTER
She states the patient was seen about a month ago, and now her ankle is swollen to her knee, and shiny and tight. She is wondering if she may need an xray. She is requesting a return call.

## 2024-04-03 NOTE — TELEPHONE ENCOUNTER
Spoke with patient. States swelling in ankle and lower right leg came up over night, no falls or injuries. She is going to check with her PCP to be assessed for non ortho symptoms. I let her know we are happy to see her and xray if she would like. She will call back after speaking with PCP.

## 2024-04-04 ENCOUNTER — OFFICE VISIT (OUTPATIENT)
Dept: INTERNAL MEDICINE CLINIC | Facility: CLINIC | Age: 69
End: 2024-04-04
Payer: MEDICARE

## 2024-04-04 ENCOUNTER — TELEPHONE (OUTPATIENT)
Dept: INTERNAL MEDICINE CLINIC | Facility: CLINIC | Age: 69
End: 2024-04-04

## 2024-04-04 VITALS
WEIGHT: 163 LBS | SYSTOLIC BLOOD PRESSURE: 150 MMHG | HEIGHT: 64 IN | DIASTOLIC BLOOD PRESSURE: 80 MMHG | HEART RATE: 86 BPM | BODY MASS INDEX: 27.83 KG/M2 | OXYGEN SATURATION: 98 %

## 2024-04-04 DIAGNOSIS — E78.2 MIXED HYPERLIPIDEMIA: ICD-10-CM

## 2024-04-04 DIAGNOSIS — M79.661 PAIN AND SWELLING OF RIGHT LOWER LEG: ICD-10-CM

## 2024-04-04 DIAGNOSIS — M79.89 PAIN AND SWELLING OF RIGHT LOWER LEG: ICD-10-CM

## 2024-04-04 DIAGNOSIS — N18.31 TYPE 2 DIABETES MELLITUS WITH STAGE 3A CHRONIC KIDNEY DISEASE, WITHOUT LONG-TERM CURRENT USE OF INSULIN (HCC): ICD-10-CM

## 2024-04-04 DIAGNOSIS — I10 ESSENTIAL HYPERTENSION: ICD-10-CM

## 2024-04-04 DIAGNOSIS — E11.22 TYPE 2 DIABETES MELLITUS WITH STAGE 3A CHRONIC KIDNEY DISEASE, WITHOUT LONG-TERM CURRENT USE OF INSULIN (HCC): ICD-10-CM

## 2024-04-04 DIAGNOSIS — D64.9 ANEMIA, UNSPECIFIED TYPE: ICD-10-CM

## 2024-04-04 DIAGNOSIS — M79.661 RIGHT CALF PAIN: Primary | ICD-10-CM

## 2024-04-04 LAB
BASOPHILS # BLD: 0.1 K/UL (ref 0–0.2)
BASOPHILS NFR BLD: 1 % (ref 0–2)
DIFFERENTIAL METHOD BLD: ABNORMAL
EOSINOPHIL # BLD: 0.2 K/UL (ref 0–0.8)
EOSINOPHIL NFR BLD: 3 % (ref 0.5–7.8)
ERYTHROCYTE [DISTWIDTH] IN BLOOD BY AUTOMATED COUNT: 13.4 % (ref 11.9–14.6)
EST. AVERAGE GLUCOSE BLD GHB EST-MCNC: 108 MG/DL
HBA1C MFR BLD: 5.4 % (ref 4.8–5.6)
HCT VFR BLD AUTO: 37.4 % (ref 35.8–46.3)
HGB BLD-MCNC: 11.6 G/DL (ref 11.7–15.4)
HGB RETIC QN AUTO: 31 PG (ref 29–35)
IMM GRANULOCYTES # BLD AUTO: 0 K/UL (ref 0–0.5)
IMM GRANULOCYTES NFR BLD AUTO: 0 % (ref 0–5)
IMM RETICS NFR: 13.9 % (ref 3–15.9)
LYMPHOCYTES # BLD: 2 K/UL (ref 0.5–4.6)
LYMPHOCYTES NFR BLD: 34 % (ref 13–44)
MCH RBC QN AUTO: 27.6 PG (ref 26.1–32.9)
MCHC RBC AUTO-ENTMCNC: 31 G/DL (ref 31.4–35)
MCV RBC AUTO: 88.8 FL (ref 82–102)
MONOCYTES # BLD: 0.5 K/UL (ref 0.1–1.3)
MONOCYTES NFR BLD: 8 % (ref 4–12)
NEUTS SEG # BLD: 3.3 K/UL (ref 1.7–8.2)
NEUTS SEG NFR BLD: 54 % (ref 43–78)
NRBC # BLD: 0 K/UL (ref 0–0.2)
PLATELET # BLD AUTO: 172 K/UL (ref 150–450)
PMV BLD AUTO: 11.8 FL (ref 9.4–12.3)
RBC # BLD AUTO: 4.21 M/UL (ref 4.05–5.2)
RETICS # AUTO: 0.07 M/UL (ref 0.03–0.1)
RETICS/RBC NFR AUTO: 1.6 % (ref 0.3–2)
WBC # BLD AUTO: 6 K/UL (ref 4.3–11.1)

## 2024-04-04 PROCEDURE — G8419 CALC BMI OUT NRM PARAM NOF/U: HCPCS | Performed by: NURSE PRACTITIONER

## 2024-04-04 PROCEDURE — 3079F DIAST BP 80-89 MM HG: CPT | Performed by: NURSE PRACTITIONER

## 2024-04-04 PROCEDURE — 3046F HEMOGLOBIN A1C LEVEL >9.0%: CPT | Performed by: NURSE PRACTITIONER

## 2024-04-04 PROCEDURE — 1090F PRES/ABSN URINE INCON ASSESS: CPT | Performed by: NURSE PRACTITIONER

## 2024-04-04 PROCEDURE — G8399 PT W/DXA RESULTS DOCUMENT: HCPCS | Performed by: NURSE PRACTITIONER

## 2024-04-04 PROCEDURE — 2022F DILAT RTA XM EVC RTNOPTHY: CPT | Performed by: NURSE PRACTITIONER

## 2024-04-04 PROCEDURE — 1123F ACP DISCUSS/DSCN MKR DOCD: CPT | Performed by: NURSE PRACTITIONER

## 2024-04-04 PROCEDURE — 99214 OFFICE O/P EST MOD 30 MIN: CPT | Performed by: NURSE PRACTITIONER

## 2024-04-04 PROCEDURE — 1036F TOBACCO NON-USER: CPT | Performed by: NURSE PRACTITIONER

## 2024-04-04 PROCEDURE — G8427 DOCREV CUR MEDS BY ELIG CLIN: HCPCS | Performed by: NURSE PRACTITIONER

## 2024-04-04 PROCEDURE — 3077F SYST BP >= 140 MM HG: CPT | Performed by: NURSE PRACTITIONER

## 2024-04-04 PROCEDURE — 3017F COLORECTAL CA SCREEN DOC REV: CPT | Performed by: NURSE PRACTITIONER

## 2024-04-04 RX ORDER — LISINOPRIL AND HYDROCHLOROTHIAZIDE 20; 12.5 MG/1; MG/1
1 TABLET ORAL DAILY
Qty: 30 TABLET | Refills: 5 | Status: SHIPPED | OUTPATIENT
Start: 2024-04-04

## 2024-04-04 ASSESSMENT — ENCOUNTER SYMPTOMS
COUGH: 0
NAUSEA: 0
VOMITING: 0

## 2024-04-04 ASSESSMENT — PATIENT HEALTH QUESTIONNAIRE - PHQ9
SUM OF ALL RESPONSES TO PHQ QUESTIONS 1-9: 0
2. FEELING DOWN, DEPRESSED OR HOPELESS: NOT AT ALL
SUM OF ALL RESPONSES TO PHQ QUESTIONS 1-9: 0
SUM OF ALL RESPONSES TO PHQ9 QUESTIONS 1 & 2: 0
1. LITTLE INTEREST OR PLEASURE IN DOING THINGS: NOT AT ALL
SUM OF ALL RESPONSES TO PHQ QUESTIONS 1-9: 0
SUM OF ALL RESPONSES TO PHQ QUESTIONS 1-9: 0

## 2024-04-04 NOTE — PROGRESS NOTES
figures it is from being laid up.   She had hip fracture in jan and ORIF.     Swelling  This is a new problem. The current episode started yesterday. The problem has been unchanged. Associated symptoms include fatigue. Pertinent negatives include no coughing, nausea or vomiting.       Review of Systems   Constitutional:  Positive for fatigue.   Respiratory:  Negative for cough.    Gastrointestinal:  Negative for nausea and vomiting.          Objective   Physical Exam  Vitals reviewed.   Constitutional:       Appearance: Normal appearance.   HENT:      Head: Normocephalic.      Right Ear: External ear normal.      Left Ear: External ear normal.   Eyes:      Extraocular Movements: Extraocular movements intact.      Pupils: Pupils are equal, round, and reactive to light.   Cardiovascular:      Rate and Rhythm: Normal rate and regular rhythm.   Pulmonary:      Effort: Pulmonary effort is normal.      Breath sounds: No wheezing.   Musculoskeletal:         General: Swelling present.      Cervical back: Neck supple.      Right lower leg: Swelling and tenderness present.      Right ankle: Swelling present.      Comments: Normal pulses on right foot, cool toes  Spider veins bilateral lower legs  Swelling and puffiness right ankle and lower leg  Positive jesse's   Tender to palpation right calf  No redness     Skin:     General: Skin is warm and dry.   Neurological:      Mental Status: She is alert.                  An electronic signature was used to authenticate this note.    --Sanjuana Archer, APRN - CNP

## 2024-04-04 NOTE — TELEPHONE ENCOUNTER
STAT US was scheduled for 1:00 today at [facility] by PCP office. Patient called back stating that she could not get a ride. Voiced urgency in that PCP wanted patient to get STAT US today and not to wait until tomorrow hence why it wasn't scheduled at [facility]. PCP discussed possible risk to patient in waiting to get imaging done. Pt notified to contact imaging department to reschedule appt to a time that fits her schedule/ride situation

## 2024-04-05 ENCOUNTER — HOSPITAL ENCOUNTER (OUTPATIENT)
Dept: ULTRASOUND IMAGING | Age: 69
End: 2024-04-05
Payer: MEDICARE

## 2024-04-05 DIAGNOSIS — M79.661 PAIN AND SWELLING OF RIGHT LOWER LEG: ICD-10-CM

## 2024-04-05 DIAGNOSIS — M79.89 PAIN AND SWELLING OF RIGHT LOWER LEG: ICD-10-CM

## 2024-04-05 DIAGNOSIS — M79.661 RIGHT CALF PAIN: ICD-10-CM

## 2024-04-05 LAB
ALBUMIN SERPL-MCNC: 3.8 G/DL (ref 3.2–4.6)
ALBUMIN/GLOB SERPL: 1.2 (ref 0.4–1.6)
ALP SERPL-CCNC: 80 U/L (ref 50–136)
ALT SERPL-CCNC: 23 U/L (ref 12–65)
ANION GAP SERPL CALC-SCNC: 7 MMOL/L (ref 2–11)
AST SERPL-CCNC: 17 U/L (ref 15–37)
BILIRUB SERPL-MCNC: 0.3 MG/DL (ref 0.2–1.1)
BUN SERPL-MCNC: 13 MG/DL (ref 8–23)
CALCIUM SERPL-MCNC: 9.2 MG/DL (ref 8.3–10.4)
CHLORIDE SERPL-SCNC: 107 MMOL/L (ref 103–113)
CO2 SERPL-SCNC: 27 MMOL/L (ref 21–32)
CREAT SERPL-MCNC: 1 MG/DL (ref 0.6–1)
FERRITIN SERPL-MCNC: 33 NG/ML (ref 8–388)
GLOBULIN SER CALC-MCNC: 3.3 G/DL (ref 2.8–4.5)
GLUCOSE SERPL-MCNC: 102 MG/DL (ref 65–100)
IRON SERPL-MCNC: 56 UG/DL (ref 35–150)
POTASSIUM SERPL-SCNC: 4.2 MMOL/L (ref 3.5–5.1)
PROT SERPL-MCNC: 7.1 G/DL (ref 6.3–8.2)
SODIUM SERPL-SCNC: 141 MMOL/L (ref 136–146)
TIBC SERPL-MCNC: 306 UG/DL (ref 250–450)
TSH W FREE THYROID IF ABNORMAL: 1.24 UIU/ML (ref 0.36–3.74)

## 2024-04-05 PROCEDURE — 93971 EXTREMITY STUDY: CPT

## 2024-04-11 ENCOUNTER — OFFICE VISIT (OUTPATIENT)
Dept: INTERNAL MEDICINE CLINIC | Facility: CLINIC | Age: 69
End: 2024-04-11
Payer: MEDICARE

## 2024-04-11 VITALS
DIASTOLIC BLOOD PRESSURE: 78 MMHG | HEIGHT: 64 IN | BODY MASS INDEX: 26.98 KG/M2 | TEMPERATURE: 98.8 F | HEART RATE: 52 BPM | WEIGHT: 158 LBS | SYSTOLIC BLOOD PRESSURE: 112 MMHG | OXYGEN SATURATION: 100 %

## 2024-04-11 DIAGNOSIS — Z12.11 SCREENING FOR COLON CANCER: Primary | ICD-10-CM

## 2024-04-11 DIAGNOSIS — Z00.00 MEDICARE ANNUAL WELLNESS VISIT, SUBSEQUENT: ICD-10-CM

## 2024-04-11 PROCEDURE — 3078F DIAST BP <80 MM HG: CPT | Performed by: NURSE PRACTITIONER

## 2024-04-11 PROCEDURE — 3074F SYST BP LT 130 MM HG: CPT | Performed by: NURSE PRACTITIONER

## 2024-04-11 PROCEDURE — G0439 PPPS, SUBSEQ VISIT: HCPCS | Performed by: NURSE PRACTITIONER

## 2024-04-11 PROCEDURE — 1123F ACP DISCUSS/DSCN MKR DOCD: CPT | Performed by: NURSE PRACTITIONER

## 2024-04-11 PROCEDURE — 3017F COLORECTAL CA SCREEN DOC REV: CPT | Performed by: NURSE PRACTITIONER

## 2024-04-11 RX ORDER — LISINOPRIL AND HYDROCHLOROTHIAZIDE 20; 12.5 MG/1; MG/1
1 TABLET ORAL DAILY
Qty: 30 TABLET | Refills: 5 | Status: SHIPPED | OUTPATIENT
Start: 2024-04-11

## 2024-04-11 RX ORDER — PRAVASTATIN SODIUM 40 MG
40 TABLET ORAL DAILY
Qty: 90 TABLET | Refills: 1 | Status: SHIPPED | OUTPATIENT
Start: 2024-04-11

## 2024-04-11 NOTE — PATIENT INSTRUCTIONS
most days of the week.     Try to quit or cut back on using tobacco and other nicotine products. This includes smoking and vaping. If you need help quitting, talk to your doctor about stop-smoking programs and medicines. These can increase your chances of quitting for good. Quitting is one of the most important things you can do to protect your heart. It is never too late to quit. Try to avoid secondhand smoke too.     Stay at a weight that's healthy for you. Talk to your doctor if you need help losing weight.     Try to get 7 to 9 hours of sleep each night.     Limit alcohol to 2 drinks a day for men and 1 drink a day for women. Too much alcohol can cause health problems.     Manage other health problems such as diabetes, high blood pressure, and high cholesterol. If you think you may have a problem with alcohol or drug use, talk to your doctor.   Medicines    Take your medicines exactly as prescribed. Call your doctor if you think you are having a problem with your medicine.     If your doctor recommends aspirin, take the amount directed each day. Make sure you take aspirin and not another kind of pain reliever, such as acetaminophen (Tylenol).   When should you call for help?   Call 911 if you have symptoms of a heart attack. These may include:    Chest pain or pressure, or a strange feeling in the chest.     Sweating.     Shortness of breath.     Pain, pressure, or a strange feeling in the back, neck, jaw, or upper belly or in one or both shoulders or arms.     Lightheadedness or sudden weakness.     A fast or irregular heartbeat.   After you call 911, the  may tell you to chew 1 adult-strength or 2 to 4 low-dose aspirin. Wait for an ambulance. Do not try to drive yourself.  Watch closely for changes in your health, and be sure to contact your doctor if you have any problems.  Where can you learn more?  Go to https://www.healthwise.net/patientEd and enter F075 to learn more about \"A Healthy Heart: Care

## 2024-04-11 NOTE — PROGRESS NOTES
Medicare Annual Wellness Visit    Shreya Vincent is here for Follow-up (1 week on her Right Sided Calf Pain/) and Medicare AWV    Assessment & Plan   Screening for colon cancer  Medicare annual wellness visit, subsequent    Recommendations for Preventive Services Due: see orders and patient instructions/AVS.  Recommended screening schedule for the next 5-10 years is provided to the patient in written form: see Patient Instructions/AVS.     No follow-ups on file.     Subjective   {OPTIONAL - WILL AUTO-DELETE IF NOT USED:6093560879}    Patient's complete Health Risk Assessment and screening values have been reviewed and are found in Flowsheets. The following problems were reviewed today and where indicated follow up appointments were made and/or referrals ordered.    Positive Risk Factor Screenings with Interventions:    Fall Risk:  Do you feel unsteady or are you worried about falling? : (!) yes (uses cane)  2 or more falls in past year?: no  Fall with injury in past year?: (!) yes     Interventions:    {Fall Interventions:0849868144}            General HRA Questions:  Select all that apply: (!) New or Increased Pain    Pain Interventions:  {Pain Interventions:311042267}           ADL's:   Patient reports needing help with:  Select all that apply: (!) Eating, Dressing, Grooming, Bathing, Walking/Balance, Toileting (had a recent hip surgery and still requires assistance)  Select all that apply: (!) Laundry, Housekeeping  Interventions:  {Medicare AWV HRA Interventions with see above/meds/referrals:953604134}    Advanced Directives:  Do you have a Living Will?: (!) No    Intervention:  {[facility] ADVANCED DIRECTIVE:2562492193}    {OPTIONAL - only use if billing for counselin}     {OPTIONAL- LDCT, CVD, STI Counseling Statements:0078745741}            Objective   Vitals:    24 1059   BP: 112/78   Site: Left Upper Arm   Position: Sitting   Cuff Size: Medium Adult   Pulse: 52   Temp: 98.8 °F (37.1 
100%   Weight: 71.7 kg (158 lb)   Height: 1.626 m (5' 4\")      Body mass index is 27.12 kg/m².              Allergies   Allergen Reactions    Cephalexin Nausea And Vomiting    Oxycodone Nausea And Vomiting     Patient states no reaction to this medication on 1/09/24    Sulfamethoxazole-Trimethoprim Nausea And Vomiting     Prior to Visit Medications    Medication Sig Taking? Authorizing Provider   lisinopril-hydroCHLOROthiazide (PRINZIDE;ZESTORETIC) 20-12.5 MG per tablet Take 1 tablet by mouth daily Yes Sanjuana Archer, APRN - CNP   metFORMIN (GLUCOPHAGE) 1000 MG tablet TAKE 1 TABLET BY MOUTH TWICE DAILY WITH MORNING MEAL AND WITH EVENING MEAL Yes Moises Mendoza DO   pravastatin (PRAVACHOL) 40 MG tablet Take 1 tablet by mouth daily Yes Moises Mendoza DO   polyethylene glycol (MIRALAX) 17 g PACK packet Take 17 g by mouth daily Yes Provider, MD Josue Che (Including outside providers/suppliers regularly involved in providing care):   Patient Care Team:  Moises Mendoza DO as PCP - General (Family Medicine)  Moises Mendoza DO as PCP - Empaneled Provider     Reviewed and updated this visit:  Tobacco  Allergies  Meds  Med Hx  Surg Hx  Soc Hx  Fam Hx         BP Improved - continue lisinopril/hctz  Leg swelling improved with the hctz  Reviewed lab  Anemia much improved, no iron deficiency  Continue exercise and walking to rebuild strength after hip surgery

## 2024-10-11 ENCOUNTER — OFFICE VISIT (OUTPATIENT)
Dept: INTERNAL MEDICINE CLINIC | Facility: CLINIC | Age: 69
End: 2024-10-11

## 2024-10-11 VITALS
HEART RATE: 74 BPM | DIASTOLIC BLOOD PRESSURE: 60 MMHG | OXYGEN SATURATION: 98 % | WEIGHT: 169 LBS | BODY MASS INDEX: 28.85 KG/M2 | HEIGHT: 64 IN | SYSTOLIC BLOOD PRESSURE: 144 MMHG

## 2024-10-11 DIAGNOSIS — E78.2 MIXED HYPERLIPIDEMIA: ICD-10-CM

## 2024-10-11 DIAGNOSIS — E11.9 TYPE 2 DIABETES MELLITUS WITHOUT COMPLICATION, WITHOUT LONG-TERM CURRENT USE OF INSULIN (HCC): ICD-10-CM

## 2024-10-11 DIAGNOSIS — Z87.81 HISTORY OF FRACTURE OF RIGHT HIP: Primary | ICD-10-CM

## 2024-10-11 DIAGNOSIS — I10 ESSENTIAL HYPERTENSION: ICD-10-CM

## 2024-10-11 PROBLEM — S72.001A CLOSED RIGHT HIP FRACTURE (HCC): Status: RESOLVED | Noted: 2024-01-09 | Resolved: 2024-10-11

## 2024-10-11 PROBLEM — S72.001A CLOSED RIGHT HIP FRACTURE, INITIAL ENCOUNTER (HCC): Status: RESOLVED | Noted: 2024-01-10 | Resolved: 2024-10-11

## 2024-10-11 LAB
ANION GAP SERPL CALC-SCNC: 10 MMOL/L (ref 9–18)
BUN SERPL-MCNC: 18 MG/DL (ref 8–23)
CALCIUM SERPL-MCNC: 9.6 MG/DL (ref 8.8–10.2)
CHLORIDE SERPL-SCNC: 104 MMOL/L (ref 98–107)
CO2 SERPL-SCNC: 29 MMOL/L (ref 20–28)
CREAT SERPL-MCNC: 1.12 MG/DL (ref 0.6–1.1)
EST. AVERAGE GLUCOSE BLD GHB EST-MCNC: 136 MG/DL
GLUCOSE SERPL-MCNC: 110 MG/DL (ref 70–99)
HBA1C MFR BLD: 6.4 % (ref 0–5.6)
POTASSIUM SERPL-SCNC: 4.5 MMOL/L (ref 3.5–5.1)
SODIUM SERPL-SCNC: 144 MMOL/L (ref 136–145)

## 2024-10-11 RX ORDER — PRAVASTATIN SODIUM 40 MG
40 TABLET ORAL DAILY
Qty: 90 TABLET | Refills: 1 | Status: SHIPPED | OUTPATIENT
Start: 2024-10-11

## 2024-10-11 RX ORDER — LISINOPRIL AND HYDROCHLOROTHIAZIDE 12.5; 2 MG/1; MG/1
1 TABLET ORAL DAILY
Qty: 30 TABLET | Refills: 5 | Status: SHIPPED | OUTPATIENT
Start: 2024-10-11

## 2024-10-11 SDOH — ECONOMIC STABILITY: FOOD INSECURITY: WITHIN THE PAST 12 MONTHS, YOU WORRIED THAT YOUR FOOD WOULD RUN OUT BEFORE YOU GOT MONEY TO BUY MORE.: NEVER TRUE

## 2024-10-11 SDOH — ECONOMIC STABILITY: INCOME INSECURITY: HOW HARD IS IT FOR YOU TO PAY FOR THE VERY BASICS LIKE FOOD, HOUSING, MEDICAL CARE, AND HEATING?: NOT HARD AT ALL

## 2024-10-11 SDOH — ECONOMIC STABILITY: FOOD INSECURITY: WITHIN THE PAST 12 MONTHS, THE FOOD YOU BOUGHT JUST DIDN'T LAST AND YOU DIDN'T HAVE MONEY TO GET MORE.: NEVER TRUE

## 2024-10-11 NOTE — PROGRESS NOTES
Moises Mendoza DO  Diplomate of the [employer] of [employer]  [facility]      Shreya Vincent (: 1955) is a 69 y.o. female, here for evaluation of the following chief complaint(s):  Diabetes       ASSESSMENT/PLAN:  1. History of fracture of right hip  2. Type 2 diabetes mellitus without complication, without long-term current use of insulin (HCC)  -     metFORMIN (GLUCOPHAGE) 1000 MG tablet; TAKE 1 TABLET BY MOUTH TWICE DAILY WITH MORNING MEAL AND WITH EVENING MEAL, Disp-180 tablet, R-1Normal  -     Basic Metabolic Panel; Future  -     Hemoglobin A1C; Future  3. Essential hypertension  -     lisinopril-hydroCHLOROthiazide (PRINZIDE;ZESTORETIC) 20-12.5 MG per tablet; Take 1 tablet by mouth daily, Disp-30 tablet, R-5Normal  4. Mixed hyperlipidemia  -     pravastatin (PRAVACHOL) 40 MG tablet; Take 1 tablet by mouth daily, Disp-90 tablet, R-1Normal     Given her age as well as history of hip fracture with decreased bone density, we will go ahead and institute therapy with Prolia.  Discussed pros and cons.  Encourage weightbearing exercise, vitamin D supplementation as well.  Tolerating medication well and achieving desired therapeutic response.  Will continue with:   Diabetic Medications       Biguanides       metFORMIN (GLUCOPHAGE) 1000 MG tablet TAKE 1 TABLET BY MOUTH TWICE DAILY WITH MORNING MEAL AND WITH EVENING MEAL        .  A1c levels reviewed--will recheck. Encourage routine foot care. Recommend routine eye exams.  Encourage diet and exercise and medication adherence.   Tolerating medication well for HTN. Achieving desired therapeutic response with   Hypertension Medications       Antihypertensive Combinations       lisinopril-hydroCHLOROthiazide (PRINZIDE;ZESTORETIC) 20-12.5 MG per tablet Take 1 tablet by mouth daily         --will continue. Will periodically review and adjust if needed.  Encourage home monitoring.   Continue with statin therapy as tolerating

## 2024-10-15 DIAGNOSIS — Z87.81 HISTORY OF FRACTURE OF RIGHT HIP: Primary | ICD-10-CM

## 2024-10-15 RX ORDER — ALBUTEROL SULFATE 90 UG/1
4 INHALANT RESPIRATORY (INHALATION) PRN
OUTPATIENT
Start: 2024-10-15

## 2024-10-15 RX ORDER — DIPHENHYDRAMINE HYDROCHLORIDE 50 MG/ML
50 INJECTION INTRAMUSCULAR; INTRAVENOUS
OUTPATIENT
Start: 2024-10-15

## 2024-10-15 RX ORDER — ACETAMINOPHEN 325 MG/1
650 TABLET ORAL
OUTPATIENT
Start: 2024-10-15

## 2024-10-15 RX ORDER — EPINEPHRINE 1 MG/ML
0.3 INJECTION, SOLUTION, CONCENTRATE INTRAVENOUS PRN
OUTPATIENT
Start: 2024-10-15

## 2024-10-15 RX ORDER — ONDANSETRON 2 MG/ML
8 INJECTION INTRAMUSCULAR; INTRAVENOUS
OUTPATIENT
Start: 2024-10-15

## 2024-10-15 RX ORDER — SODIUM CHLORIDE 9 MG/ML
INJECTION, SOLUTION INTRAVENOUS CONTINUOUS
OUTPATIENT
Start: 2024-10-15

## 2024-10-16 DIAGNOSIS — N28.9 FUNCTION KIDNEY DECREASED: Primary | ICD-10-CM

## 2024-10-29 ENCOUNTER — LAB (OUTPATIENT)
Dept: INTERNAL MEDICINE CLINIC | Facility: CLINIC | Age: 69
End: 2024-10-29

## 2024-10-29 DIAGNOSIS — Z87.81 HISTORY OF FRACTURE OF RIGHT HIP: ICD-10-CM

## 2024-10-29 LAB
ALBUMIN SERPL-MCNC: 3.8 G/DL (ref 3.2–4.6)
ALBUMIN/GLOB SERPL: 1.1 (ref 1–1.9)
ALP SERPL-CCNC: 65 U/L (ref 35–104)
ALT SERPL-CCNC: 22 U/L (ref 8–45)
ANION GAP SERPL CALC-SCNC: 11 MMOL/L (ref 7–16)
AST SERPL-CCNC: 24 U/L (ref 15–37)
BILIRUB SERPL-MCNC: 0.4 MG/DL (ref 0–1.2)
BUN SERPL-MCNC: 16 MG/DL (ref 8–23)
CALCIUM SERPL-MCNC: 9.5 MG/DL (ref 8.8–10.2)
CALCIUM SERPL-MCNC: 9.6 MG/DL (ref 8.8–10.2)
CHLORIDE SERPL-SCNC: 103 MMOL/L (ref 98–107)
CO2 SERPL-SCNC: 30 MMOL/L (ref 20–29)
CREAT SERPL-MCNC: 1.26 MG/DL (ref 0.6–1.1)
GLOBULIN SER CALC-MCNC: 3.6 G/DL (ref 2.3–3.5)
GLUCOSE SERPL-MCNC: 98 MG/DL (ref 70–99)
PHOSPHATE SERPL-MCNC: 3.5 MG/DL (ref 2.5–4.5)
POTASSIUM SERPL-SCNC: 4.2 MMOL/L (ref 3.5–5.1)
PROT SERPL-MCNC: 7.4 G/DL (ref 6.3–8.2)
SODIUM SERPL-SCNC: 144 MMOL/L (ref 136–145)

## 2024-11-05 ENCOUNTER — TELEPHONE (OUTPATIENT)
Dept: NEUROLOGY | Age: 69
End: 2024-11-05

## 2024-11-05 NOTE — TELEPHONE ENCOUNTER
Patient called to cancel her Prolia injection scheduled for 11/6/24. She did not want to reschedule, stated she was uncomfortable with this medication and wanted to discuss more with her Provider. Informed the patient that the appointment would be cancelled, the Provider informed and would defer the order for 3 weeks. Would check in with her again at that time.

## 2025-02-03 ENCOUNTER — TELEPHONE (OUTPATIENT)
Dept: NEUROLOGY | Age: 70
End: 2025-02-03

## 2025-02-03 NOTE — TELEPHONE ENCOUNTER
Called the patient to discuss her status on getting the Prolia injection. In November the patient stated that she would discuss this with her Provider. Patient stated today that she definitely does not want to get the Prolia injection. Made the patient aware that the referral would be closed at this time and Dr Mendoza informed.

## 2025-04-25 DIAGNOSIS — I10 ESSENTIAL HYPERTENSION: ICD-10-CM

## 2025-04-25 RX ORDER — LISINOPRIL AND HYDROCHLOROTHIAZIDE 12.5; 2 MG/1; MG/1
1 TABLET ORAL DAILY
Qty: 90 TABLET | Refills: 0 | OUTPATIENT
Start: 2025-04-25

## 2025-04-27 DIAGNOSIS — I10 ESSENTIAL HYPERTENSION: ICD-10-CM

## 2025-04-28 RX ORDER — LISINOPRIL AND HYDROCHLOROTHIAZIDE 12.5; 2 MG/1; MG/1
1 TABLET ORAL DAILY
Qty: 90 TABLET | Refills: 0 | OUTPATIENT
Start: 2025-04-28

## 2025-06-09 DIAGNOSIS — I10 ESSENTIAL HYPERTENSION: ICD-10-CM

## 2025-06-09 DIAGNOSIS — E78.2 MIXED HYPERLIPIDEMIA: ICD-10-CM

## 2025-06-09 NOTE — TELEPHONE ENCOUNTER
Patient called requesting refills on her  pravastatin (PRAVACHOL) 40 MG tablet [[id number]]    Order Details  Dose: 40 mg Route: Oral Frequency: DAILY   Dispense Quantity: 90 tablet Refills: 1          Sig: Take 1 tablet by mouth daily     lisinopril-hydroCHLOROthiazide (PRINZIDE;ZESTORETIC) 20-12.5 MG per tablet [[id number]]    Order Details  Dose: 1 tablet Route: Oral Frequency: DAILY   Dispense Quantity: 30 tablet Refills: 5          Sig: Take 1 tablet by mouth daily     Pharmacy    [facility] [address] -  [phone] - F [phone]  [address] [facility] [address]  Phone: [phone]  Fax: [phone]   Patient has an appointment on 6/13/25 and would like to know if she can get enough called in until her appointment. Please Advise.

## 2025-06-10 RX ORDER — LISINOPRIL AND HYDROCHLOROTHIAZIDE 12.5; 2 MG/1; MG/1
1 TABLET ORAL DAILY
Qty: 90 TABLET | Refills: 3 | Status: SHIPPED | OUTPATIENT
Start: 2025-06-10

## 2025-06-10 RX ORDER — PRAVASTATIN SODIUM 40 MG
40 TABLET ORAL DAILY
Qty: 90 TABLET | Refills: 3 | Status: SHIPPED | OUTPATIENT
Start: 2025-06-10

## 2025-06-13 ENCOUNTER — OFFICE VISIT (OUTPATIENT)
Dept: INTERNAL MEDICINE CLINIC | Facility: CLINIC | Age: 70
End: 2025-06-13

## 2025-06-13 VITALS
OXYGEN SATURATION: 98 % | BODY MASS INDEX: 29.88 KG/M2 | WEIGHT: 175 LBS | SYSTOLIC BLOOD PRESSURE: 120 MMHG | HEIGHT: 64 IN | DIASTOLIC BLOOD PRESSURE: 70 MMHG | HEART RATE: 88 BPM

## 2025-06-13 DIAGNOSIS — E11.22 TYPE 2 DIABETES MELLITUS WITH STAGE 3A CHRONIC KIDNEY DISEASE, WITHOUT LONG-TERM CURRENT USE OF INSULIN (HCC): ICD-10-CM

## 2025-06-13 DIAGNOSIS — Z12.11 COLON CANCER SCREENING: ICD-10-CM

## 2025-06-13 DIAGNOSIS — N18.31 TYPE 2 DIABETES MELLITUS WITH STAGE 3A CHRONIC KIDNEY DISEASE, WITHOUT LONG-TERM CURRENT USE OF INSULIN (HCC): ICD-10-CM

## 2025-06-13 DIAGNOSIS — Z00.00 MEDICARE ANNUAL WELLNESS VISIT, SUBSEQUENT: Primary | ICD-10-CM

## 2025-06-13 DIAGNOSIS — I10 ESSENTIAL HYPERTENSION: ICD-10-CM

## 2025-06-13 LAB
ALBUMIN SERPL-MCNC: 3.7 G/DL (ref 3.2–4.6)
ALBUMIN/GLOB SERPL: 1 (ref 1–1.9)
ALP SERPL-CCNC: 79 U/L (ref 35–104)
ALT SERPL-CCNC: 24 U/L (ref 8–45)
ANION GAP SERPL CALC-SCNC: 14 MMOL/L (ref 7–16)
AST SERPL-CCNC: 25 U/L (ref 15–37)
BILIRUB SERPL-MCNC: 0.3 MG/DL (ref 0–1.2)
BUN SERPL-MCNC: 17 MG/DL (ref 8–23)
CALCIUM SERPL-MCNC: 9.6 MG/DL (ref 8.8–10.2)
CHLORIDE SERPL-SCNC: 104 MMOL/L (ref 98–107)
CHOLEST SERPL-MCNC: 132 MG/DL (ref 0–200)
CO2 SERPL-SCNC: 26 MMOL/L (ref 20–29)
CREAT SERPL-MCNC: 1.02 MG/DL (ref 0.6–1.1)
CREAT UR-MCNC: 101 MG/DL (ref 28–217)
ERYTHROCYTE [DISTWIDTH] IN BLOOD BY AUTOMATED COUNT: 13.1 % (ref 11.9–14.6)
EST. AVERAGE GLUCOSE BLD GHB EST-MCNC: 136 MG/DL
GLOBULIN SER CALC-MCNC: 3.8 G/DL (ref 2.3–3.5)
GLUCOSE SERPL-MCNC: 135 MG/DL (ref 70–99)
HBA1C MFR BLD: 6.4 % (ref 0–5.6)
HCT VFR BLD AUTO: 37.6 % (ref 35.8–46.3)
HDLC SERPL-MCNC: 70 MG/DL (ref 40–60)
HDLC SERPL: 1.9 (ref 0–5)
HGB BLD-MCNC: 12 G/DL (ref 11.7–15.4)
LDLC SERPL CALC-MCNC: 29 MG/DL (ref 0–100)
MCH RBC QN AUTO: 27.9 PG (ref 26.1–32.9)
MCHC RBC AUTO-ENTMCNC: 31.9 G/DL (ref 31.4–35)
MCV RBC AUTO: 87.4 FL (ref 82–102)
MICROALBUMIN UR-MCNC: <1.2 MG/DL (ref 0–20)
MICROALBUMIN/CREAT UR-RTO: NORMAL MG/G (ref 0–30)
NRBC # BLD: 0 K/UL (ref 0–0.2)
PLATELET # BLD AUTO: 187 K/UL (ref 150–450)
PMV BLD AUTO: 11.3 FL (ref 9.4–12.3)
POTASSIUM SERPL-SCNC: 4.3 MMOL/L (ref 3.5–5.1)
PROT SERPL-MCNC: 7.5 G/DL (ref 6.3–8.2)
RBC # BLD AUTO: 4.3 M/UL (ref 4.05–5.2)
SODIUM SERPL-SCNC: 145 MMOL/L (ref 136–145)
TRIGL SERPL-MCNC: 166 MG/DL (ref 0–150)
VLDLC SERPL CALC-MCNC: 33 MG/DL (ref 6–23)
WBC # BLD AUTO: 7.8 K/UL (ref 4.3–11.1)

## 2025-06-13 SDOH — ECONOMIC STABILITY: FOOD INSECURITY: WITHIN THE PAST 12 MONTHS, YOU WORRIED THAT YOUR FOOD WOULD RUN OUT BEFORE YOU GOT MONEY TO BUY MORE.: NEVER TRUE

## 2025-06-13 SDOH — ECONOMIC STABILITY: FOOD INSECURITY: WITHIN THE PAST 12 MONTHS, THE FOOD YOU BOUGHT JUST DIDN'T LAST AND YOU DIDN'T HAVE MONEY TO GET MORE.: NEVER TRUE

## 2025-06-13 ASSESSMENT — PATIENT HEALTH QUESTIONNAIRE - PHQ9
SUM OF ALL RESPONSES TO PHQ QUESTIONS 1-9: 0
2. FEELING DOWN, DEPRESSED OR HOPELESS: NOT AT ALL
1. LITTLE INTEREST OR PLEASURE IN DOING THINGS: NOT AT ALL
SUM OF ALL RESPONSES TO PHQ QUESTIONS 1-9: 0

## 2025-06-13 NOTE — PROGRESS NOTES
Have you been to the ER, urgent care clinic since your last visit?  Hospitalized since your last visit?   NO    Have you seen or consulted any other health care providers outside our system since your last visit?   NO      “Have you had a colorectal cancer screening such as a colonoscopy/FIT/Cologuard?    NO    No colonoscopy on file  No cologuard on file  No FIT/FOBT on file   No flexible sigmoidoscopy on file     “Have you had a diabetic eye exam?”    NO     Date of last diabetic eye exam: 6/13/2023

## 2025-06-13 NOTE — PROGRESS NOTES
Medicare Annual Wellness Visit    Shreya Ojedatower is here for Medicare AWV and Diabetes    Assessment & Plan  - Medicare wellness visit.     - Diabetes mellitus.    - Tolerating medication well and achieving desired therapeutic response.  Will continue with:   Diabetic Medications       Biguanides       metFORMIN (GLUCOPHAGE) 1000 MG tablet TAKE 1 TABLET BY MOUTH TWICE DAILY WITH MORNING MEAL AND WITH EVENING MEAL        .  A1c levels reviewed. Encourage routine foot care. Recommend routine eye exams.  Encourage diet and exercise and medication adherence.     - Osteoporosis.    - Declined Prolia treatment.    - Discussed fracture risks and treatment importance.    - Provided counseling on Prolia safety and efficacy.    - Colon cancer screening.      - Ordered Cologuard test.    -Tolerating medication well for HTN. Achieving desired therapeutic response with   Hypertension Medications       Antihypertensive Combinations       lisinopril-hydroCHLOROthiazide (PRINZIDE;ZESTORETIC) 20-12.5 MG per tablet Take 1 tablet by mouth daily         --will continue. Will periodically review and adjust if needed.  Encourage home monitoring.         Medicare annual wellness visit, subsequent  Essential hypertension  Type 2 diabetes mellitus with stage 3a chronic kidney disease, without long-term current use of insulin (HCC)  -     metFORMIN (GLUCOPHAGE) 1000 MG tablet; TAKE 1 TABLET BY MOUTH TWICE DAILY WITH MORNING MEAL AND WITH EVENING MEAL, Disp-180 tablet, R-3Normal  -     Comprehensive Metabolic Panel; Future  -     Hemoglobin A1C; Future  -     Lipid Panel; Future  -     Albumin/Creatinine Ratio, Urine; Future  -     CBC; Future  Colon cancer screening  -     Cologuard (Fecal DNA Colorectal Cancer Screening)    Results  Labs   - Blood sugar: 135       Return in about 6 months (around 12/13/2025), or if symptoms worsen or fail to improve.     Subjective   The following acute and/or chronic problems were also addressed

## 2025-06-14 ENCOUNTER — RESULTS FOLLOW-UP (OUTPATIENT)
Dept: INTERNAL MEDICINE CLINIC | Facility: CLINIC | Age: 70
End: 2025-06-14

## (undated) DEVICE — SUTURE ABSORBABLE ANTIBACT 2-0 CT-2 9 IN STRATAFIX PDS + SXPP1A422

## (undated) DEVICE — SUTURE VCRL + 3-0 L27IN ABSRB UD PS-2 L19MM 3/8 CIR PRIM VCP427H

## (undated) DEVICE — 4.0MM PRECISION ROUND

## (undated) DEVICE — LOCKING DRILL

## (undated) DEVICE — (D)PREP SKN CHLRAPRP APPL 26ML -- CONVERT TO ITEM 371833

## (undated) DEVICE — TAP: Brand: OASYS

## (undated) DEVICE — SURG GL, SENSICARE PI ORTHO LT,LF,PF,8.5: Brand: MEDLINE

## (undated) DEVICE — GUIDE WIRE, BALL-TIPPED, STERILE

## (undated) DEVICE — LAG SCREW
Type: IMPLANTABLE DEVICE | Status: NON-FUNCTIONAL
Brand: GAMMA

## (undated) DEVICE — DRAPE,U/SHT,SPLIT,FILM,60X84,STERILE: Brand: MEDLINE

## (undated) DEVICE — SUTURE ETHBND EXCEL SZ 2 L30IN NONABSORBABLE GRN L40MM V-37 MX69G

## (undated) DEVICE — SOLUTION IV 1000ML 0.9% SOD CHL

## (undated) DEVICE — PACK PROCEDURE SURG POST LAMINECTOMY CDS

## (undated) DEVICE — SUTURE STRATAFIX SPRL MCRYL + SZ 3-0 L18IN ABSRB UD PS-2 SXMP1B107

## (undated) DEVICE — SHEET,DRAPE,53X77,STERILE: Brand: MEDLINE

## (undated) DEVICE — TFN: Brand: MEDLINE INDUSTRIES, INC.

## (undated) DEVICE — TRAY CATH 16F URIN MTR LTX -- CONVERT TO ITEM 363111

## (undated) DEVICE — 7 DAY SILVER-COATED ANTIMICROBIAL BARRIER DRESSING: Brand: ACTICOAT 7  4" X 5"

## (undated) DEVICE — DRESSING HYDROFIBER AQUACEL AG ADVANTAGE 3.5X10 IN

## (undated) DEVICE — WAX SURG 2.5GM HEMSTAT BNE BEESWAX PARAFFIN ISO PALMITATE

## (undated) DEVICE — GLOVE ORANGE PI 8   MSG9080

## (undated) DEVICE — SUTURE STRATAFIX SYMMETRIC SZ 1 L18IN ABSRB VLT CT1 L36CM SXPP1A404

## (undated) DEVICE — KIT ARMOR C DRP COLLAPSIBLE AND SELF EXP TOP CVR FOR FLUOROSCOPIC

## (undated) DEVICE — REAMER SHAFT, MOD.TRINKLE: Brand: BIXCUT

## (undated) DEVICE — PRECISION PIN TAPERED: Brand: GAMMA

## (undated) DEVICE — C-ARM: Brand: UNBRANDED

## (undated) DEVICE — KENDALL SCD EXPRESS SLEEVES, KNEE LENGTH, MEDIUM: Brand: KENDALL SCD

## (undated) DEVICE — DRILL: Brand: OASYS

## (undated) DEVICE — SOLUTION IRRIG 1000ML 0.9% SOD CHL USP POUR PLAS BTL

## (undated) DEVICE — AMD ANTIMICROBIAL GAUZE SPONGES,12 PLY USP TYPE VII, 0.2% POLYHEXAMETHYLENE BIGUANIDE HCI (PHMB): Brand: CURITY

## (undated) DEVICE — DRAIN KT WND 10FR RND 400ML --

## (undated) DEVICE — REM POLYHESIVE ADULT PATIENT RETURN ELECTRODE: Brand: VALLEYLAB

## (undated) DEVICE — 3M™ TEGADERM™ TRANSPARENT FILM DRESSING FRAME STYLE, 1626W, 4 IN X 4-3/4 IN (10 CM X 12 CM), 50/CT 4CT/CASE: Brand: 3M™ TEGADERM™

## (undated) DEVICE — SHEET, DRAPE, SPLIT, STERILE: Brand: MEDLINE

## (undated) DEVICE — ADHESIVE SKIN CLOSURE WND 8.661X1.5 IN 22 CM LIQUIBAND SECUR